# Patient Record
Sex: FEMALE | Race: WHITE | NOT HISPANIC OR LATINO | Employment: UNEMPLOYED | URBAN - METROPOLITAN AREA
[De-identification: names, ages, dates, MRNs, and addresses within clinical notes are randomized per-mention and may not be internally consistent; named-entity substitution may affect disease eponyms.]

---

## 2017-06-29 ENCOUNTER — LAB VISIT (OUTPATIENT)
Dept: LAB | Facility: HOSPITAL | Age: 27
End: 2017-06-29
Payer: COMMERCIAL

## 2017-06-29 ENCOUNTER — OFFICE VISIT (OUTPATIENT)
Dept: FAMILY MEDICINE | Facility: CLINIC | Age: 27
End: 2017-06-29
Payer: COMMERCIAL

## 2017-06-29 VITALS
TEMPERATURE: 98 F | BODY MASS INDEX: 27.39 KG/M2 | WEIGHT: 145.06 LBS | SYSTOLIC BLOOD PRESSURE: 110 MMHG | HEART RATE: 100 BPM | DIASTOLIC BLOOD PRESSURE: 60 MMHG | OXYGEN SATURATION: 98 % | RESPIRATION RATE: 18 BRPM | HEIGHT: 61 IN

## 2017-06-29 DIAGNOSIS — R29.898 WEAKNESS OF WRIST: ICD-10-CM

## 2017-06-29 DIAGNOSIS — M54.50 CHRONIC LOW BACK PAIN WITHOUT SCIATICA, UNSPECIFIED BACK PAIN LATERALITY: ICD-10-CM

## 2017-06-29 DIAGNOSIS — Z86.39 HISTORY OF IRON DEFICIENCY: ICD-10-CM

## 2017-06-29 DIAGNOSIS — G89.29 CHRONIC LOW BACK PAIN WITHOUT SCIATICA, UNSPECIFIED BACK PAIN LATERALITY: ICD-10-CM

## 2017-06-29 DIAGNOSIS — Z86.32 HISTORY OF GESTATIONAL DIABETES: ICD-10-CM

## 2017-06-29 DIAGNOSIS — M79.602 LEFT ARM PAIN: Primary | ICD-10-CM

## 2017-06-29 LAB
25(OH)D3+25(OH)D2 SERPL-MCNC: 8 NG/ML
ALBUMIN SERPL BCP-MCNC: 3.8 G/DL
ALP SERPL-CCNC: 57 U/L
ALT SERPL W/O P-5'-P-CCNC: 11 U/L
ANION GAP SERPL CALC-SCNC: 9 MMOL/L
AST SERPL-CCNC: 15 U/L
BASOPHILS # BLD AUTO: 0.02 K/UL
BASOPHILS NFR BLD: 0.2 %
BILIRUB SERPL-MCNC: 0.5 MG/DL
BUN SERPL-MCNC: 14 MG/DL
CALCIUM SERPL-MCNC: 9.1 MG/DL
CHLORIDE SERPL-SCNC: 107 MMOL/L
CHOLEST/HDLC SERPL: 4.7 {RATIO}
CO2 SERPL-SCNC: 23 MMOL/L
CREAT SERPL-MCNC: 0.7 MG/DL
DIFFERENTIAL METHOD: ABNORMAL
EOSINOPHIL # BLD AUTO: 0.1 K/UL
EOSINOPHIL NFR BLD: 0.9 %
ERYTHROCYTE [DISTWIDTH] IN BLOOD BY AUTOMATED COUNT: 13.3 %
EST. GFR  (AFRICAN AMERICAN): >60 ML/MIN/1.73 M^2
EST. GFR  (NON AFRICAN AMERICAN): >60 ML/MIN/1.73 M^2
GLUCOSE SERPL-MCNC: 92 MG/DL
HCT VFR BLD AUTO: 41.9 %
HDL/CHOLESTEROL RATIO: 21.2 %
HDLC SERPL-MCNC: 189 MG/DL
HDLC SERPL-MCNC: 40 MG/DL
HGB BLD-MCNC: 14.3 G/DL
IRON SERPL-MCNC: 95 UG/DL
LDLC SERPL CALC-MCNC: 111.8 MG/DL
LYMPHOCYTES # BLD AUTO: 2.6 K/UL
LYMPHOCYTES NFR BLD: 29.2 %
MCH RBC QN AUTO: 28.3 PG
MCHC RBC AUTO-ENTMCNC: 34.1 %
MCV RBC AUTO: 83 FL
MONOCYTES # BLD AUTO: 0.5 K/UL
MONOCYTES NFR BLD: 5.7 %
NEUTROPHILS # BLD AUTO: 5.7 K/UL
NEUTROPHILS NFR BLD: 63.6 %
NONHDLC SERPL-MCNC: 149 MG/DL
PLATELET # BLD AUTO: 117 K/UL
PMV BLD AUTO: 12.4 FL
POTASSIUM SERPL-SCNC: 4.3 MMOL/L
PROT SERPL-MCNC: 7.6 G/DL
RBC # BLD AUTO: 5.05 M/UL
SATURATED IRON: 31 %
SODIUM SERPL-SCNC: 139 MMOL/L
TOTAL IRON BINDING CAPACITY: 311 UG/DL
TRANSFERRIN SERPL-MCNC: 210 MG/DL
TRIGL SERPL-MCNC: 186 MG/DL
TSH SERPL DL<=0.005 MIU/L-ACNC: 1.52 UIU/ML
WBC # BLD AUTO: 9.01 K/UL

## 2017-06-29 PROCEDURE — 99999 PR PBB SHADOW E&M-EST. PATIENT-LVL IV: CPT | Mod: PBBFAC,,, | Performed by: FAMILY MEDICINE

## 2017-06-29 PROCEDURE — 84443 ASSAY THYROID STIM HORMONE: CPT

## 2017-06-29 PROCEDURE — 36415 COLL VENOUS BLD VENIPUNCTURE: CPT | Mod: PO

## 2017-06-29 PROCEDURE — 80061 LIPID PANEL: CPT

## 2017-06-29 PROCEDURE — 83540 ASSAY OF IRON: CPT

## 2017-06-29 PROCEDURE — 99203 OFFICE O/P NEW LOW 30 MIN: CPT | Mod: S$GLB,,, | Performed by: FAMILY MEDICINE

## 2017-06-29 PROCEDURE — 80053 COMPREHEN METABOLIC PANEL: CPT

## 2017-06-29 PROCEDURE — 85025 COMPLETE CBC W/AUTO DIFF WBC: CPT

## 2017-06-29 PROCEDURE — 83036 HEMOGLOBIN GLYCOSYLATED A1C: CPT

## 2017-06-29 PROCEDURE — 82306 VITAMIN D 25 HYDROXY: CPT

## 2017-06-29 PROCEDURE — 82607 VITAMIN B-12: CPT

## 2017-06-29 NOTE — PATIENT INSTRUCTIONS
4 Steps for Eating Healthier  Changing the way you eat can improve your health. It can lower your cholesterol and blood pressure, and help you stay at a healthy weight. Your diet doesnt have to be bland and boring to be healthy. Just watch your calories and follow these steps:    1. Eat fewer unhealthy fats  · Choose more fish and lean meats instead of fatty cuts of meat.  · Skip butter and lard, and use less margarine.  · Pass on foods that have palm, coconut, or hydrogenated oils.  · Eat fewer high-fat dairy foods like cheese, ice cream, and whole milk.  · Get a heart-healthy cookbook and try some low-fat recipes.  2. Go light on salt  · Keep the saltshaker off the table.  · Limit high-salt ingredients, such as soy sauce, bouillon, and garlic salt.  · Instead of adding salt when cooking, season your food with herbs and flavorings. Try lemon, garlic, and onion.  · Limit convenience foods, such as boxed or canned foods and restaurant food.  · Read food labels and choose lower-sodium options.  3. Limit sugar  · Pause before you add sugars to pancakes, cereal, coffee, or tea. This includes white and brown table sugar, syrup, honey, and molasses. Cut your usual amount by half.  · Use non-sugar sweeteners. Stevia, aspartame, and sucralose can satisfy a sweet tooth without adding calories.  · Swap out sugar-filled soda and other drinks. Buy sugar-free or low-calorie beverages. Remember water is always the best choice.  · Read labels and choose foods with less added sugar. Keep in mind that dairy foods and foods with fruit will have some natural sugar.  · Cut the sugar in recipes by 1/3 to 1/2. Boost the flavor with extracts like almond, vanilla, or orange. Or add spices such as cinnamon or nutmeg.  4. Eat more fiber  · Eat fresh fruits and vegetables every day.  · Boost your diet with whole grains. Go for oats, whole-grain rice, and bran.  · Add beans and lentils to your meals.  · Drink more water to match your fiber  increase. This is to help prevent constipation.  Date Last Reviewed: 5/11/2015  © 5332-9314 [employer] [address] [place] [address], [place] [address]. All rights reserved. This information is not intended as a substitute for professional medical care. Always follow your healthcare professional's instructions.

## 2017-06-30 ENCOUNTER — TELEPHONE (OUTPATIENT)
Dept: FAMILY MEDICINE | Facility: CLINIC | Age: 27
End: 2017-06-30

## 2017-06-30 DIAGNOSIS — R79.89 LOW VITAMIN B12 LEVEL: ICD-10-CM

## 2017-06-30 DIAGNOSIS — R79.89 LOW VITAMIN D LEVEL: ICD-10-CM

## 2017-06-30 DIAGNOSIS — D69.6 THROMBOCYTOPENIA: ICD-10-CM

## 2017-06-30 LAB
ESTIMATED AVG GLUCOSE: 103 MG/DL
HBA1C MFR BLD HPLC: 5.2 %
VIT B12 SERPL-MCNC: 255 PG/ML

## 2017-06-30 RX ORDER — ERGOCALCIFEROL 1.25 MG/1
50000 CAPSULE ORAL
Qty: 16 CAPSULE | Refills: 0 | Status: SHIPPED | OUTPATIENT
Start: 2017-06-30 | End: 2018-04-10

## 2017-06-30 NOTE — TELEPHONE ENCOUNTER
Call  with results;  (patient has limited English) B12 and vitamin d low - start vitamin b 12 1000 mcg daily which is over the counter.  Start prescription vitamin d once a week for 16 weeks then return to clinic in 4 months to have labs rechecked.

## 2017-06-30 NOTE — TELEPHONE ENCOUNTER
----- Message from Jennifer Heaton sent at 6/30/2017 10:50 AM CDT -----  Contact:   He was returning a call regarding her lab results.   Call him at [phone].                                       tobin

## 2017-07-03 ENCOUNTER — PATIENT MESSAGE (OUTPATIENT)
Dept: FAMILY MEDICINE | Facility: CLINIC | Age: 27
End: 2017-07-03

## 2017-08-08 RX ORDER — ERGOCALCIFEROL 1.25 MG/1
50000 CAPSULE ORAL
Qty: 16 CAPSULE | Refills: 0 | OUTPATIENT
Start: 2017-08-08

## 2017-08-29 ENCOUNTER — OFFICE VISIT (OUTPATIENT)
Dept: PAIN MEDICINE | Facility: CLINIC | Age: 27
End: 2017-08-29
Payer: COMMERCIAL

## 2017-08-29 ENCOUNTER — HOSPITAL ENCOUNTER (OUTPATIENT)
Dept: RADIOLOGY | Facility: HOSPITAL | Age: 27
Discharge: HOME | End: 2017-08-29
Attending: ANESTHESIOLOGY
Payer: COMMERCIAL

## 2017-08-29 VITALS
RESPIRATION RATE: 18 BRPM | WEIGHT: 145 LBS | HEIGHT: 61 IN | SYSTOLIC BLOOD PRESSURE: 116 MMHG | BODY MASS INDEX: 27.38 KG/M2 | DIASTOLIC BLOOD PRESSURE: 52 MMHG | HEART RATE: 92 BPM

## 2017-08-29 DIAGNOSIS — G89.29 CHRONIC LOW BACK PAIN WITHOUT SCIATICA, UNSPECIFIED BACK PAIN LATERALITY: ICD-10-CM

## 2017-08-29 DIAGNOSIS — G89.29 CHRONIC THORACIC BACK PAIN, UNSPECIFIED BACK PAIN LATERALITY: ICD-10-CM

## 2017-08-29 DIAGNOSIS — M54.6 CHRONIC THORACIC BACK PAIN, UNSPECIFIED BACK PAIN LATERALITY: ICD-10-CM

## 2017-08-29 DIAGNOSIS — M54.12 RADICULOPATHY OF CERVICAL SPINE: Primary | ICD-10-CM

## 2017-08-29 DIAGNOSIS — M79.18 MYOFASCIAL PAIN: ICD-10-CM

## 2017-08-29 DIAGNOSIS — M54.50 CHRONIC LOW BACK PAIN WITHOUT SCIATICA, UNSPECIFIED BACK PAIN LATERALITY: ICD-10-CM

## 2017-08-29 PROCEDURE — 72070 X-RAY EXAM THORAC SPINE 2VWS: CPT | Mod: 26,,, | Performed by: RADIOLOGY

## 2017-08-29 PROCEDURE — 72114 X-RAY EXAM L-S SPINE BENDING: CPT | Mod: TC

## 2017-08-29 PROCEDURE — 99204 OFFICE O/P NEW MOD 45 MIN: CPT | Mod: S$GLB,,, | Performed by: ANESTHESIOLOGY

## 2017-08-29 PROCEDURE — 72070 X-RAY EXAM THORAC SPINE 2VWS: CPT | Mod: TC

## 2017-08-29 PROCEDURE — 99999 PR PBB SHADOW E&M-EST. PATIENT-LVL III: CPT | Mod: PBBFAC,,, | Performed by: ANESTHESIOLOGY

## 2017-08-29 PROCEDURE — 3008F BODY MASS INDEX DOCD: CPT | Mod: S$GLB,,, | Performed by: ANESTHESIOLOGY

## 2017-08-29 PROCEDURE — 72114 X-RAY EXAM L-S SPINE BENDING: CPT | Mod: 26,,, | Performed by: RADIOLOGY

## 2017-08-29 NOTE — LETTER
August 29, 2017      Althea Fu MD  [address]  [facility] [address]           [facility]  [id number] [place] [address]  [place] [address]  Phone: [phone]  Fax: [phone]          Patient: Karlee Guevara   MR Number: [id number]   YOB: 1990   Date of Visit: 8/29/2017       Dear Dr. Althea Fu:    Thank you for referring Karlee Guevara to me for evaluation. Attached you will find relevant portions of my assessment and plan of care.    If you have questions, please do not hesitate to call me. I look forward to following Karlee Guevara along with you.    Sincerely,    Maximino Bustamante MD    Enclosure  CC:  No Recipients    If you would like to receive this communication electronically, please contact [email or web address] or [phone] to request more information on [employer] Link access.    For providers and/or their staff who would like to refer a patient to [facility], please contact us through our one-stop-shop provider referral line, [facility] [place], at [phone].    If you feel you have received this communication in error or would no longer like to receive these types of communications, please e-mail [email or web address]

## 2017-08-29 NOTE — PROGRESS NOTES
"Chief Pain Complaint:  Neck pain, right arm pain, mid and low back pain    History of Present Illness:   This patient is a 27 y.o. female who presents today complaining of the above noted pain/s. The patient describes the pain as follows.    - duration of pain: 8 years   - timing: intermittent   - character: aching, sharp  - radiating, dermatomal: extends into right arm, not strictly dermatomal, thoracic and lumbar pain is nonradiating  - antecedent trauma, prior spinal surgery: no prior trauma, no prior spinal surgery   - pertinent negatives: No fever, No chills, No weight loss, No bladder dysfunction, No bowel dysfunction, No saddle anesthesia  - pertinent positives: right arm weakness    - medications, other therapies tried (physical therapy, injections):     >> Tylenol    >> Has previously undergone Physical Therapy, only underwent a couple of sessions of PT, also tried chiropractic care    >> Has NOT previously undergone spinal injection/s      Imaging / Labs / Studies (reviewed on 8/29/2017): None for review      Review of Systems:  CONSTITUTIONAL: patient denies any fever, chills, or weight loss  SKIN: patient denies any rash or itching  RESPIRATORY: patient denies having any shortness of breath  GASTROINTESTINAL: patient denies having any diarrhea, constipation, or bowel incontinence  GENITOURINARY: patient denies having any abnormal bladder function    MUSCULOSKELETAL:  - patient complains of the above noted pain/s (see chief pain complaint)    NEUROLOGICAL:   - pain as above  - strength in Upper extremities is decreased, on the RIGHT  - sensation in Upper extremities is abnormal, on the RIGHT  - patient denies any loss of bowel or bladder control      PSYCHIATRIC: patient denies any change in mood    Other:  All other systems reviewed and are negative      Physical Exam:  BP (!) 116/52 (BP Location: Right arm, Patient Position: Sitting, BP Method: Medium (Manual))   Pulse 92   Resp 18   Ht 5' 1" (1.549 " m)   Wt 65.8 kg (145 lb)   BMI 27.40 kg/m²  (reviewed on 8/29/2017)  General: alert and oriented, in no apparent distress  Gait: normal gait  Skin: No rashes, No discoloration, No obvious lesions  HEENT: EOMI  Cardiovascular: no significant peripheral edema present  Respiratory: respirations nonlabored    Musculoskeletal:  - Any pain on flexion, extension, rotation:    >> No pain on extension and rotation of the lumbar spine, mild right side pain on extension and rotation of the C-spine    - Spurling's Test: causes No radiating pain  - Any tenderness to palpation across paraspinal muscles, joints, bursae:     >> No pain across thoracic or lumbar paraspinals, mild tenderness along right side cervical paraspinals into right upper trapezius    Neuro:  - Upper Extremity Strength:     >> LEFT :: 5/5    >> RIGHT :: 5/5  - Upper Extremity Reflexes:    >> LEFT  :: 2+    >> RIGHT :: 2+    - Harrington's sign:     >> LEFT :: negative    >> RIGHT :: negative      Psych:  Mood and affect is appropriate      Assessment:  Myofascial pain  Cervical radiculopathy  Thoracic pain  Lumbago      Plan:  Patient presents today complaining of intermittent right side neck pain with extension into the right arm, along with nonradiating thoracic and lumbar pain.  She has had this pain for years.  Again pain comes and goes, she has the pain primarily with increased activity.  Patient's pain appears more myofascial to me.  They expressed interest in undergoing further workup.  I will send patient for a cervical MRI along with a thoracic and lumbar x-ray.  I will have patient back in 2-3 weeks for imaging review.  Imaging / studies reviewed, detailed above.  I discussed in detail the risks, benefits, and alternatives to any and all potential treatment options.  All questions and concerns were fully addressed today in clinic.      Disclaimer:  This note may have been prepared using voice recognition software, it may have not been extensively  proofed, as such there could be errors within the text such as sound alike errors.

## 2017-09-05 ENCOUNTER — HOSPITAL ENCOUNTER (OUTPATIENT)
Dept: RADIOLOGY | Facility: HOSPITAL | Age: 27
Discharge: HOME | End: 2017-09-05
Attending: ANESTHESIOLOGY
Payer: COMMERCIAL

## 2017-09-05 DIAGNOSIS — M54.12 RADICULOPATHY OF CERVICAL SPINE: ICD-10-CM

## 2017-09-05 PROCEDURE — 72141 MRI NECK SPINE W/O DYE: CPT | Mod: TC

## 2017-09-23 ENCOUNTER — PATIENT MESSAGE (OUTPATIENT)
Dept: PAIN MEDICINE | Facility: CLINIC | Age: 27
End: 2017-09-23

## 2017-09-26 ENCOUNTER — PATIENT MESSAGE (OUTPATIENT)
Dept: PAIN MEDICINE | Facility: CLINIC | Age: 27
End: 2017-09-26

## 2017-10-25 ENCOUNTER — OFFICE VISIT (OUTPATIENT)
Dept: INTERNAL MEDICINE | Facility: CLINIC | Age: 27
End: 2017-10-25
Payer: COMMERCIAL

## 2017-10-25 VITALS
WEIGHT: 147.69 LBS | HEIGHT: 61 IN | BODY MASS INDEX: 27.88 KG/M2 | TEMPERATURE: 97 F | HEART RATE: 80 BPM | DIASTOLIC BLOOD PRESSURE: 60 MMHG | SYSTOLIC BLOOD PRESSURE: 102 MMHG

## 2017-10-25 DIAGNOSIS — Z23 INFLUENZA VACCINATION ADMINISTERED AT CURRENT VISIT: Primary | ICD-10-CM

## 2017-10-25 DIAGNOSIS — Z23 NEED FOR VARICELLA VACCINE: ICD-10-CM

## 2017-10-25 DIAGNOSIS — Z23 NEED FOR MMR VACCINE: ICD-10-CM

## 2017-10-25 DIAGNOSIS — Z23 NEED FOR TDAP VACCINATION: ICD-10-CM

## 2017-10-25 PROCEDURE — 90715 TDAP VACCINE 7 YRS/> IM: CPT | Mod: S$GLB,,, | Performed by: FAMILY MEDICINE

## 2017-10-25 PROCEDURE — 90707 MMR VACCINE SC: CPT | Mod: S$GLB,,, | Performed by: FAMILY MEDICINE

## 2017-10-25 PROCEDURE — 99214 OFFICE O/P EST MOD 30 MIN: CPT | Mod: 25,S$GLB,, | Performed by: NURSE PRACTITIONER

## 2017-10-25 PROCEDURE — 90716 VAR VACCINE LIVE SUBQ: CPT | Mod: S$GLB,,, | Performed by: FAMILY MEDICINE

## 2017-10-25 PROCEDURE — 90686 IIV4 VACC NO PRSV 0.5 ML IM: CPT | Mod: S$GLB,,, | Performed by: FAMILY MEDICINE

## 2017-10-25 PROCEDURE — 90471 IMMUNIZATION ADMIN: CPT | Mod: S$GLB,,, | Performed by: FAMILY MEDICINE

## 2017-10-25 PROCEDURE — 99999 PR PBB SHADOW E&M-EST. PATIENT-LVL IV: CPT | Mod: PBBFAC,,, | Performed by: NURSE PRACTITIONER

## 2017-10-25 PROCEDURE — 90472 IMMUNIZATION ADMIN EACH ADD: CPT | Mod: S$GLB,,, | Performed by: FAMILY MEDICINE

## 2017-10-25 NOTE — PATIENT INSTRUCTIONS
Measles/Mumps/Rubella Vaccines, MMR injection  What is this medicine?  MEASLES VIRUS; MUMPS VIRUS; RUBELLA VIRUS VACCINE LIVE (LANDON zuhlz [facility]; muhmps [facility]; layla yolanda [facility] vak SEEN lahyv ) is used to prevent an infection with measles (rubeola), mumps, and rubella ([place] measles) viruses. It is used to prevent infection in children over 12 months old, adults that have not been vaccinated and are not pregnant, and anyone traveling to countries where there are high rates of measles, mumps, or rubella.  How should I use this medicine?  This vaccine is for injection under the skin. It is given by a health care professional.  A copy of Vaccine Information Statements will be given before each vaccination. Read this sheet carefully each time. The sheet may change frequently.  Talk to your pediatrician regarding the use of this medicine in children. While this drug may be prescribed for children as young as 12 months of age for selected conditions, precautions do apply.  What side effects may I notice from receiving this medicine?  Side effects that you should report to your doctor or health care professional as soon as possible:  · allergic reactions like skin rash, itching or hives, swelling of the face, lips, or tongue  · breathing problems  · changes in hearing  · changes in vision  · difficulty walking  · extreme changes in behavior  · fast, irregular heartbeat  · fever over 100 degrees F  · pain, tingling, numbness in the hands or feet  · seizures  · unusual bleeding or bruising  · unusually weak or tired  Side effects that usually do not require medical attention (report to your doctor or health care professional if they continue or are bothersome):  · aches or pains  · bruising, pain, swelling at site where injected  · diarrhea  · headache  · low-grade fever of 100 degrees F or less  · nausea, vomiting  · runny nose, cough  · sleepy  · swollen glands  What may interact with this medicine?  Do not  take this medicine with any of the following medications:  · adalimumab  · anakinra  · etanercept  · infliximab  · medicines that suppress your immune system  · medicines to treat cancer  This medicine may also interact with the following medications:  · immune globulins  · live virus vaccines  What if I miss a dose?  Keep appointments for follow-up (booster) doses as directed. It is important not to miss your dose. Call your doctor or health care professional if you are unable to keep an appointment.  Where should I keep my medicine?  This drug is given in a hospital or clinic and will not be stored at home.  What should I tell my health care provider before I take this medicine?  They need to know if you have any of these conditions:  · bleeding disorder  · cancer including leukemia or lymphoma  · immune system problems  · infection with fever  · low levels of platelets in the blood  · recent blood transfusion or immune globulin infusion  · seizure disorder  · taking medicines for immunosuppression  · an unusual or allergic reaction to vaccines, eggs, neomycin, gelatin, other medicines, foods, dyes, or preservatives  · pregnant or trying to get pregnant  · breast-feeding  What should I watch for while using this medicine?  Visit your doctor for check-ups as directed.  Do not become pregnant for 3 months after receiving this vaccine. Women should inform their doctor if they wish to become pregnant or think they might be pregnant. There is a potential for serious side effects to an unborn child. Talk to your health care professional or pharmacist for more information.  NOTE:This sheet is a summary. It may not cover all possible information. If you have questions about this medicine, talk to your doctor, pharmacist, or health care provider. Copyright© 2017 [employer]        Measles Virus; Mumps Virus; Rubella Virus; Varicella Virus Vaccine, Live  What is this medicine?  MEASLES VIRUS; MUMPS VIRUS; RUBELLA VIRUS;  VARICELLA VIRUS VACCINE LIVE (LANDON zuhlz ; muhmps ; layla BEL ; and claudia  GUILLE [facility]) is a live vaccine to protect from an infection with measles (rubeola), mumps, rubella ([place] measles), and varicella (chickenpox) viruses. It is approved for use in children 1 to 12 years of age.  How should I use this medicine?  This vaccine is for injection under the skin. It is given by a health care professional.  A copy of Vaccine Information Statements will be given before each vaccination. Read this sheet carefully each time. The sheet may change frequently.  Talk to your pediatrician regarding the use of this medicine in children. While this drug may be prescribed for children as young as 1 year of age for selected conditions, precautions do apply.  What side effects may I notice from receiving this medicine?  Side effects that you should report to your doctor or health care professional as soon as possible:  · allergic reactions like skin rash, itching or hives, swelling of the face, lips, or tongue  · breathing problems  · ear pain  · extreme irritability  · fever over 102 degrees F  · seizures  · unusual bruising or bleeding  · unusual drooping or paralysis of face  · muscle weakness  Side effects that usually do not require medical attention (report to your doctor or health care professional if they continue or are bothersome):  · cough  · diarrhea  · headache  · muscle aches and pains  · pain at site where injected  · runny or stuffy nose  · tired  · trouble sleeping  What may interact with this medicine?  Do not take this medicine with any of the following medications:  · adalimumab  · anakinra  · etanercept  · infliximab  · medicines for organ transplant  · some medicines for arthritis  · steroid medicines like prednisone or cortisone  This medicine may also interact with the following medications:  · aspirin and aspirin-like medicines  · immunoglobulins  · medicines to treat cancer  · other  vaccines  What if I miss a dose?  This does not apply.  Where should I keep my medicine?  This drug is given in a hospital or clinic and will not be stored at home.  What should I tell my health care provider before I take this medicine?  They need to know if you have any of these conditions:  · blood system disease or problem  · cancer  · fever or infection  · history of organ transplant  · immune system problems  · other chronic health problems  · seizures  · taking steroids or other medicines to suppress the immune system  · tuberculosis  · an unusual or allergic reaction to measles, mumps, rubella, or varicella virus vaccine, neomycin, gelatin, eggs, albumin, other medicines, foods, dyes, or preservatives  · pregnant or trying to get pregnant  · breast-feeding  What should I watch for while using this medicine?  This vaccine may not protect from all measles, mumps, rubella, and varicella infections.  After you receive this vaccine, stay away from people who are at a high risk for varicella infection. You could give the varicella infection to another person for up to 6 weeks after getting this vaccine. This includes people with HIV or AIDS, people with cancer, some pregnant women, and some babies. Ask your health care professional if you have any questions.  Do not take any aspirin products for 6 weeks after receiving this vaccine.  Women should inform their doctor if they wish to become pregnant or think they might be pregnant. There is a potential for serious side effects to an unborn child. Use effective birth control for at least 3 months after receiving this vaccine. Talk to your health care professional or pharmacist for more information.  NOTE:This sheet is a summary. It may not cover all possible information. If you have questions about this medicine, talk to your doctor, pharmacist, or health care provider. Copyright© 2017 [employer]

## 2017-10-25 NOTE — PROGRESS NOTES
Subjective:       Patient ID: Karlee Guevara is a 27 y.o. female.    Chief Complaint: Immunizations (flu, varicella, mmr)    Her  is here to translate for her as she speaks minimal English. 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 She has been in this country [place] 2 years. She was pregnant and therefore did not receive the MMR and varicella vaccines a year ago. She denies fever, sweats, chills, sob, headache, abd pain, n/v/d, cancer dx, immunosuppressing condition or medication, pregnancy, night sweats, unintentional weight loss or any other acute issues.       Review of Systems   Constitutional: Negative for activity change, appetite change, chills, diaphoresis, fatigue, fever and unexpected weight change.   HENT: Negative for congestion, ear pain, postnasal drip, rhinorrhea, sinus pressure, sneezing and sore throat.    Eyes: Negative for photophobia, pain and visual disturbance.   Respiratory: Negative for cough, chest tightness and shortness of breath.    Cardiovascular: Negative for chest pain, palpitations and leg swelling.   Gastrointestinal: Negative for abdominal pain, constipation, diarrhea, nausea and vomiting.   Genitourinary: Negative for dysuria and frequency.   Musculoskeletal: Negative for arthralgias.   Neurological: Negative for dizziness, light-headedness and headaches.   Psychiatric/Behavioral: Negative for sleep disturbance.       Objective:      Physical Exam   Constitutional: She is oriented to person, place, and time. She appears well-developed and well-nourished.   HENT:   Head: Normocephalic and atraumatic.   Right Ear: Tympanic membrane normal.   Left Ear: Tympanic membrane normal.   Eyes: Conjunctivae and EOM are normal. Pupils are equal, round, and reactive to light.   Neck: Normal range of motion. Neck supple.   Cardiovascular: Normal rate, regular rhythm, normal heart sounds and intact distal pulses.    Pulmonary/Chest: Effort normal and  breath sounds normal.   Abdominal: Soft. Bowel sounds are normal.   Musculoskeletal: Normal range of motion.   Neurological: She is alert and oriented to person, place, and time.   Skin: Skin is warm and dry.   Psychiatric: She has a normal mood and affect.       Assessment:       1. Influenza vaccination administered at current visit    2. Need for MMR vaccine    3. Need for varicella vaccine    4. Need for Tdap vaccination        Plan:   1. MMR, Varicella, Flu, and Tdap immunizations now- possible side effects discussed in detail  2. Print outs for gonorrhea/chlymydia and TB gold results given  3. Immunization records given

## 2017-10-30 ENCOUNTER — OFFICE VISIT (OUTPATIENT)
Dept: FAMILY MEDICINE | Facility: CLINIC | Age: 27
End: 2017-10-30
Payer: COMMERCIAL

## 2017-10-30 ENCOUNTER — LAB VISIT (OUTPATIENT)
Dept: LAB | Facility: HOSPITAL | Age: 27
End: 2017-10-30
Attending: REGISTERED NURSE
Payer: COMMERCIAL

## 2017-10-30 VITALS
TEMPERATURE: 98 F | DIASTOLIC BLOOD PRESSURE: 66 MMHG | SYSTOLIC BLOOD PRESSURE: 122 MMHG | WEIGHT: 148.13 LBS | RESPIRATION RATE: 18 BRPM | HEIGHT: 61 IN | HEART RATE: 98 BPM | OXYGEN SATURATION: 99 % | BODY MASS INDEX: 27.97 KG/M2

## 2017-10-30 DIAGNOSIS — R79.89 LOW VITAMIN B12 LEVEL: ICD-10-CM

## 2017-10-30 DIAGNOSIS — H57.11 EYE PAIN, RIGHT: Primary | ICD-10-CM

## 2017-10-30 DIAGNOSIS — D69.6 THROMBOCYTOPENIA: ICD-10-CM

## 2017-10-30 DIAGNOSIS — R79.89 LOW VITAMIN D LEVEL: ICD-10-CM

## 2017-10-30 PROCEDURE — 82607 VITAMIN B-12: CPT

## 2017-10-30 PROCEDURE — 99999 PR PBB SHADOW E&M-EST. PATIENT-LVL IV: CPT | Mod: PBBFAC,,, | Performed by: REGISTERED NURSE

## 2017-10-30 PROCEDURE — 36415 COLL VENOUS BLD VENIPUNCTURE: CPT | Mod: PO

## 2017-10-30 PROCEDURE — 85025 COMPLETE CBC W/AUTO DIFF WBC: CPT

## 2017-10-30 PROCEDURE — 82306 VITAMIN D 25 HYDROXY: CPT

## 2017-10-30 PROCEDURE — 99213 OFFICE O/P EST LOW 20 MIN: CPT | Mod: S$GLB,,, | Performed by: REGISTERED NURSE

## 2017-10-31 PROBLEM — D69.6 THROMBOCYTOPENIA: Status: RESOLVED | Noted: 2017-06-30 | Resolved: 2017-10-31

## 2017-10-31 LAB
25(OH)D3+25(OH)D2 SERPL-MCNC: 16 NG/ML
BASOPHILS # BLD AUTO: 0.05 K/UL
BASOPHILS NFR BLD: 0.5 %
DIFFERENTIAL METHOD: NORMAL
EOSINOPHIL # BLD AUTO: 0.1 K/UL
EOSINOPHIL NFR BLD: 1.3 %
ERYTHROCYTE [DISTWIDTH] IN BLOOD BY AUTOMATED COUNT: 12.3 %
HCT VFR BLD AUTO: 39.8 %
HGB BLD-MCNC: 13.4 G/DL
IMM GRANULOCYTES # BLD AUTO: 0.01 K/UL
IMM GRANULOCYTES NFR BLD AUTO: 0.1 %
LYMPHOCYTES # BLD AUTO: 3.1 K/UL
LYMPHOCYTES NFR BLD: 30.1 %
MCH RBC QN AUTO: 27.8 PG
MCHC RBC AUTO-ENTMCNC: 33.7 G/DL
MCV RBC AUTO: 83 FL
MONOCYTES # BLD AUTO: 0.6 K/UL
MONOCYTES NFR BLD: 5.4 %
NEUTROPHILS # BLD AUTO: 6.5 K/UL
NEUTROPHILS NFR BLD: 62.6 %
NRBC BLD-RTO: 0 /100 WBC
PLATELET # BLD AUTO: 223 K/UL
PMV BLD AUTO: 11.8 FL
RBC # BLD AUTO: 4.82 M/UL
VIT B12 SERPL-MCNC: 519 PG/ML
WBC # BLD AUTO: 10.43 K/UL

## 2017-10-31 NOTE — PROGRESS NOTES
"Subjective:       Patient ID: Karlee Guevara is a 27 y.o. female.    Chief Complaint: Eye Pain      HPI    Karlee is here today with her , with c/o RT eye pain.  Reports her son poked something into the eye yesterday, she thinks might have been a piece of chocolate.  Since that time, she has had pain in the RT eye with a "black spot".  Eye has been draining some.  Denies vision problems.    Also here today to f/u on labs ordered per Dr. Fu.  Vitamin-D and B12 have been low, she has been taking her supplements as ordered.  Does c/o hair thinning and falling out, muscle aches and leg pain at times.  Reports intermittent hand numbness.      Component      Latest Ref Rng & Units 6/29/2017   Vitamin B-12      210 - 950 pg/mL 255       Component      Latest Ref Rng & Units 6/29/2017   Vit D, 25-Hydroxy      30 - 96 ng/mL 8 (L)       Review of Systems   Constitutional: Negative.    Eyes: Positive for pain and discharge. Negative for photophobia, redness, itching and visual disturbance.   Respiratory: Negative.    Cardiovascular: Negative.    Musculoskeletal: Positive for myalgias.   Skin: Positive for rash (facial acne).   Neurological: Negative.    Psychiatric/Behavioral: Negative.          Patient Active Problem List   Diagnosis    Low vitamin B12 level    Low vitamin D level    Insulin resistance    Iron deficiency anemia    Overweight (BMI 25.0-29.9)         Objective:     Vitals:    10/30/17 1624   BP: 122/66   BP Location: Left arm   Patient Position: Sitting   BP Method: Medium (Manual)   Pulse: 98   Resp: 18   Temp: 98.1 °F (36.7 °C)   TempSrc: Tympanic   SpO2: 99%   Weight: 67.2 kg (148 lb 2.4 oz)   Height: 5' 1" (1.549 m)       Physical Exam   Constitutional: She is oriented to person, place, and time. She appears well-developed.   Eyes: Conjunctivae, EOM and lids are normal. Pupils are equal, round, and reactive to light. Right eye exhibits no discharge, no exudate and no hordeolum. No " foreign body present in the right eye. Right conjunctiva is not injected. Right conjunctiva has no hemorrhage. No scleral icterus.   Neurological: She is alert and oriented to person, place, and time.         Medication List with Changes/Refills   Current Medications    ERGOCALCIFEROL (ERGOCALCIFEROL) 50,000 UNIT CAP    Take 1 capsule (50,000 Units total) by mouth every 7 days.    OMEPRAZOLE (PRILOSEC) 10 MG CAPSULE    Take 10 mg by mouth once daily.           Diagnosis       1. Eye pain, right    2. Low vitamin D level    3. Low vitamin B12 level          Assessment/ Plan     Eye pain, right  -     Ambulatory Referral to Ophthalmology    Low vitamin D level    Low vitamin B12 level        Lab pending --- ordered and to be reviewed by Dr. Fu.  To Eye MD for evaluation.  Follow-up in clinic as needed.        BARBARA Martin  [facility]

## 2017-11-01 ENCOUNTER — PATIENT MESSAGE (OUTPATIENT)
Dept: FAMILY MEDICINE | Facility: CLINIC | Age: 27
End: 2017-11-01

## 2017-11-21 ENCOUNTER — OFFICE VISIT (OUTPATIENT)
Dept: OPHTHALMOLOGY | Facility: CLINIC | Age: 27
End: 2017-11-21
Payer: COMMERCIAL

## 2017-11-21 DIAGNOSIS — H57.11 PAIN OF RIGHT EYE: Primary | ICD-10-CM

## 2017-11-21 PROCEDURE — 92002 INTRM OPH EXAM NEW PATIENT: CPT | Mod: S$GLB,,, | Performed by: OPTOMETRIST

## 2017-11-21 PROCEDURE — 99999 PR PBB SHADOW E&M-EST. PATIENT-LVL II: CPT | Mod: PBBFAC,,, | Performed by: OPTOMETRIST

## 2018-04-05 NOTE — PROGRESS NOTES
Subjective:       Patient ID: Karlee Gueavra is a 28 y.o. female.    Chief Complaint: Headache and Dizziness      HPI    Karlee is here today with her , who interprets for her today due to language barrier.  Does have h/o Vitamin-D deficiency, low b12 and anemia but has not been taking her supplements.  She has noticed over the past few weeks, increased fatigue, some hair loss, muscle aches and a feeling of overall malaise.  Does also c/o posterior headache pain radiating down RT arm at times.  Describes her HA pain as more of a pressure with occ episodes of dizziness and numbness to fingertips.  She has seen Dr. Bustamante for neck and back issues previously.  Radiology reports reviewed.  She has been taking OTC pain medication with ice and heat, works okay per her report.    Also with reports of being excessively unhappy and has an excessive need for attention.  Has frequent crying spells.  Reports good appetite, sleeping well.  Denies any suicidal thoughts or plans to harm self,  or small child.  Denies post-partum depression or anxiety.  They request for her to see someone to speak with regarding these issues.          Procedure:  Exam Date: Reason for Exam:   MRI Cervical Spine Without Contrast 09/05/2017 neck pain, right arm pain      RESULTS:  Exam: MRI of the cervical spine without contrast.     History:  Neck pain, right arm pain. Radiculopathy, cervical region.     Findings:     Alignment is normal. The cervical cord demonstrates normal morphology and signal. The craniocervical junction is unremarkable.     Small central disc protrusion at C5-6, without significant mass effect on the thecal sac. Remainder of the intervertebral discs demonstrate normal morphology and signal. Central canal and neural foramina are widely patent.    IMPRESSION:   Small central disc protrusion at C5-6 without significant mass effect.        Procedure:  Exam Date: Reason for Exam:   X-Ray Lumbar Complete  "With Flex And Ext 08/29/2017 low back pain      RESULTS:  Seven views of the lumbar spine including flexion and extension views.     Findings: The vertebral bodies demonstrate normal height.  The alignment is within normal limits. The disk space heights are maintained. Mild facet arthropathy suspected at L5-S1 level bilaterally. No pars defects.No listhesis noted on the flexion or extension views.           Review of Systems   Constitutional: Positive for fatigue. Negative for activity change, appetite change, chills, diaphoresis, fever and unexpected weight change.   HENT: Negative.    Eyes: Negative.    Respiratory: Negative.    Cardiovascular: Negative.    Gastrointestinal: Negative.    Genitourinary: Negative.    Musculoskeletal: Positive for myalgias and neck pain. Negative for back pain, gait problem, joint swelling and neck stiffness.   Skin: Negative.    Neurological: Positive for dizziness, light-headedness, numbness and headaches. Negative for tremors, seizures, syncope, facial asymmetry, speech difficulty and weakness.   Hematological: Negative for adenopathy. Does not bruise/bleed easily.   Psychiatric/Behavioral: Positive for confusion and dysphoric mood. Negative for agitation, decreased concentration, hallucinations, self-injury, sleep disturbance and suicidal ideas. The patient is not nervous/anxious and is not hyperactive.          Patient Active Problem List   Diagnosis    Low vitamin B12 level    Low vitamin D level    Insulin resistance    Iron deficiency anemia    Overweight (BMI 25.0-29.9)       Past medical, social and family histories have been reviewed today.      Objective:     Vitals:    04/06/18 1029   BP: 110/73   BP Location: Right arm   Patient Position: Sitting   BP Method: Large (Automatic)   Pulse: 86   Resp: 17   Temp: 98 °F (36.7 °C)   TempSrc: Tympanic   SpO2: 99%   Weight: 67.5 kg (148 lb 13 oz)   Height: 5' 1" (1.549 m)       Physical Exam   Constitutional: She is oriented " to person, place, and time. She appears well-developed and well-nourished.   HENT:   Head: Normocephalic and atraumatic.   Eyes: Conjunctivae and EOM are normal. Pupils are equal, round, and reactive to light.   Neck: Normal range of motion and full passive range of motion without pain. Muscular tenderness present. No spinous process tenderness present. Normal range of motion present.       Cardiovascular: Normal rate, regular rhythm and normal heart sounds.  Exam reveals no gallop and no friction rub.    No murmur heard.  Pulmonary/Chest: Effort normal and breath sounds normal.   Musculoskeletal: Normal range of motion.        Lumbar back: She exhibits tenderness. She exhibits no swelling, no edema, no deformity and normal pulse.        Back:    Neurological: She is alert and oriented to person, place, and time. No sensory deficit. She exhibits normal muscle tone. Coordination normal.   Skin: Skin is warm and dry. Capillary refill takes less than 2 seconds.   Psychiatric: She has a normal mood and affect. Her behavior is normal. Judgment and thought content normal. She is not slowed and not withdrawn. Thought content is not paranoid and not delusional. She expresses no suicidal ideation. She is attentive.   Vitals reviewed.        Medication List with Changes/Refills   Current Medications    ERGOCALCIFEROL (ERGOCALCIFEROL) 50,000 UNIT CAP    Take 1 capsule (50,000 Units total) by mouth every 7 days.    OMEPRAZOLE (PRILOSEC) 10 MG CAPSULE    Take 10 mg by mouth once daily.           Diagnosis       1. Fatigue, unspecified type    2. Vitamin D deficiency disease    3. Iron deficiency anemia, unspecified iron deficiency anemia type    4. Low vitamin B12 level    5. Radiculopathy of cervical spine    6. DDD (degenerative disc disease), lumbosacral    7. Depression, unspecified depression type          Assessment/ Plan     Fatigue, unspecified type  -     CBC Without Differential; Future; Expected date: 04/06/2018  -      Basic metabolic panel; Future; Expected date: 04/06/2018  -     TSH; Future; Expected date: 04/06/2018  -     Vitamin D; Future; Expected date: 04/06/2018  -     Ferritin; Future; Expected date: 04/06/2018  -     Iron and TIBC; Future; Expected date: 04/06/2018  -     Vitamin B12; Future; Expected date: 04/06/2018    Vitamin D deficiency disease  -     Vitamin D; Future; Expected date: 04/06/2018    Iron deficiency anemia, unspecified iron deficiency anemia type  -     CBC Without Differential; Future; Expected date: 04/06/2018  -     Basic metabolic panel; Future; Expected date: 04/06/2018  -     Ferritin; Future; Expected date: 04/06/2018  -     Iron and TIBC; Future; Expected date: 04/06/2018    Low vitamin B12 level  -     Vitamin B12; Future; Expected date: 04/06/2018    DDD (degenerative disc disease), lumbosacral        -     This problem is currently not controlled.  -     Ambulatory referral to Pain Clinic    Radiculopathy of cervical spine        -     This problem is currently not controlled.  -     Ambulatory referral to Pain Clinic    Depression, unspecified depression type        -     This is a new problem that has been identified during today's visit.         -     No medication at this time, she wishes to speak with a counselor or Psychologist regarding issues.  -     Ambulatory referral to Social Work        Lab pending.  Follow-up in clinic as needed.        BARBARA Martin  [facility]

## 2018-04-06 ENCOUNTER — LAB VISIT (OUTPATIENT)
Dept: LAB | Facility: HOSPITAL | Age: 28
End: 2018-04-06
Payer: COMMERCIAL

## 2018-04-06 ENCOUNTER — OFFICE VISIT (OUTPATIENT)
Dept: FAMILY MEDICINE | Facility: CLINIC | Age: 28
End: 2018-04-06
Payer: COMMERCIAL

## 2018-04-06 VITALS
HEIGHT: 61 IN | BODY MASS INDEX: 28.1 KG/M2 | RESPIRATION RATE: 17 BRPM | SYSTOLIC BLOOD PRESSURE: 110 MMHG | DIASTOLIC BLOOD PRESSURE: 73 MMHG | WEIGHT: 148.81 LBS | HEART RATE: 86 BPM | TEMPERATURE: 98 F | OXYGEN SATURATION: 99 %

## 2018-04-06 DIAGNOSIS — D50.9 IRON DEFICIENCY ANEMIA, UNSPECIFIED IRON DEFICIENCY ANEMIA TYPE: ICD-10-CM

## 2018-04-06 DIAGNOSIS — E55.9 VITAMIN D DEFICIENCY DISEASE: ICD-10-CM

## 2018-04-06 DIAGNOSIS — M54.12 RADICULOPATHY OF CERVICAL SPINE: ICD-10-CM

## 2018-04-06 DIAGNOSIS — R79.89 LOW VITAMIN B12 LEVEL: ICD-10-CM

## 2018-04-06 DIAGNOSIS — R53.83 FATIGUE, UNSPECIFIED TYPE: ICD-10-CM

## 2018-04-06 DIAGNOSIS — F32.A DEPRESSION, UNSPECIFIED DEPRESSION TYPE: ICD-10-CM

## 2018-04-06 DIAGNOSIS — M51.37 DDD (DEGENERATIVE DISC DISEASE), LUMBOSACRAL: ICD-10-CM

## 2018-04-06 DIAGNOSIS — R53.83 FATIGUE, UNSPECIFIED TYPE: Primary | ICD-10-CM

## 2018-04-06 LAB
25(OH)D3+25(OH)D2 SERPL-MCNC: 18 NG/ML
ANION GAP SERPL CALC-SCNC: 9 MMOL/L
BUN SERPL-MCNC: 11 MG/DL
CALCIUM SERPL-MCNC: 9.4 MG/DL
CHLORIDE SERPL-SCNC: 106 MMOL/L
CO2 SERPL-SCNC: 25 MMOL/L
CREAT SERPL-MCNC: 0.7 MG/DL
ERYTHROCYTE [DISTWIDTH] IN BLOOD BY AUTOMATED COUNT: 12.4 %
EST. GFR  (AFRICAN AMERICAN): >60 ML/MIN/1.73 M^2
EST. GFR  (NON AFRICAN AMERICAN): >60 ML/MIN/1.73 M^2
FERRITIN SERPL-MCNC: 108 NG/ML
GLUCOSE SERPL-MCNC: 95 MG/DL
HCT VFR BLD AUTO: 39.5 %
HGB BLD-MCNC: 13.1 G/DL
IRON SERPL-MCNC: 115 UG/DL
MCH RBC QN AUTO: 27.2 PG
MCHC RBC AUTO-ENTMCNC: 33.2 G/DL
MCV RBC AUTO: 82 FL
PLATELET # BLD AUTO: 253 K/UL
PMV BLD AUTO: 10.8 FL
POTASSIUM SERPL-SCNC: 4.3 MMOL/L
RBC # BLD AUTO: 4.81 M/UL
SATURATED IRON: 41 %
SODIUM SERPL-SCNC: 140 MMOL/L
TOTAL IRON BINDING CAPACITY: 281 UG/DL
TRANSFERRIN SERPL-MCNC: 190 MG/DL
TSH SERPL DL<=0.005 MIU/L-ACNC: 1.57 UIU/ML
VIT B12 SERPL-MCNC: 377 PG/ML
WBC # BLD AUTO: 7.85 K/UL

## 2018-04-06 PROCEDURE — 80048 BASIC METABOLIC PNL TOTAL CA: CPT

## 2018-04-06 PROCEDURE — 82306 VITAMIN D 25 HYDROXY: CPT

## 2018-04-06 PROCEDURE — 85027 COMPLETE CBC AUTOMATED: CPT

## 2018-04-06 PROCEDURE — 36415 COLL VENOUS BLD VENIPUNCTURE: CPT | Mod: PO

## 2018-04-06 PROCEDURE — 84443 ASSAY THYROID STIM HORMONE: CPT

## 2018-04-06 PROCEDURE — 83540 ASSAY OF IRON: CPT

## 2018-04-06 PROCEDURE — 99214 OFFICE O/P EST MOD 30 MIN: CPT | Mod: S$GLB,,, | Performed by: REGISTERED NURSE

## 2018-04-06 PROCEDURE — 82728 ASSAY OF FERRITIN: CPT

## 2018-04-06 PROCEDURE — 99999 PR PBB SHADOW E&M-EST. PATIENT-LVL V: CPT | Mod: PBBFAC,,, | Performed by: REGISTERED NURSE

## 2018-04-06 PROCEDURE — 82607 VITAMIN B-12: CPT

## 2018-04-07 ENCOUNTER — PATIENT MESSAGE (OUTPATIENT)
Dept: FAMILY MEDICINE | Facility: CLINIC | Age: 28
End: 2018-04-07

## 2018-04-10 ENCOUNTER — TELEPHONE (OUTPATIENT)
Dept: FAMILY MEDICINE | Facility: CLINIC | Age: 28
End: 2018-04-10

## 2018-04-10 ENCOUNTER — PATIENT MESSAGE (OUTPATIENT)
Dept: FAMILY MEDICINE | Facility: CLINIC | Age: 28
End: 2018-04-10

## 2018-04-10 DIAGNOSIS — E55.9 VITAMIN D DEFICIENCY DISEASE: ICD-10-CM

## 2018-04-10 PROBLEM — R79.89 LOW VITAMIN B12 LEVEL: Status: RESOLVED | Noted: 2017-06-30 | Resolved: 2018-04-10

## 2018-04-10 RX ORDER — CEPHRADINE 500 MG
10000 CAPSULE ORAL WEEKLY
Qty: 4 EACH | Refills: 6 | Status: SHIPPED | OUTPATIENT
Start: 2018-04-10 | End: 2019-01-17 | Stop reason: ALTCHOICE

## 2018-04-10 NOTE — TELEPHONE ENCOUNTER
Labs reviewed --- contact  please.    Okay --- B12, iron, thyroid    Vitamin-D is low --- RX sent to pharmacy for supplement.  Recheck in 3 mo.

## 2018-04-16 ENCOUNTER — TELEPHONE (OUTPATIENT)
Dept: FAMILY MEDICINE | Facility: CLINIC | Age: 28
End: 2018-04-16

## 2018-04-16 DIAGNOSIS — E66.3 OVERWEIGHT (BMI 25.0-29.9): Primary | ICD-10-CM

## 2018-04-17 ENCOUNTER — TELEPHONE (OUTPATIENT)
Dept: FAMILY MEDICINE | Facility: CLINIC | Age: 28
End: 2018-04-17

## 2018-04-17 ENCOUNTER — PATIENT MESSAGE (OUTPATIENT)
Dept: PAIN MEDICINE | Facility: CLINIC | Age: 28
End: 2018-04-17

## 2018-04-17 ENCOUNTER — TELEPHONE (OUTPATIENT)
Dept: DIABETES | Facility: CLINIC | Age: 28
End: 2018-04-17

## 2018-04-17 NOTE — TELEPHONE ENCOUNTER
Notified patient of referral denial spoke with  and offered patient cash option for visit.  denied cash payment.

## 2018-04-25 ENCOUNTER — OFFICE VISIT (OUTPATIENT)
Dept: PAIN MEDICINE | Facility: CLINIC | Age: 28
End: 2018-04-25
Payer: COMMERCIAL

## 2018-04-25 VITALS
WEIGHT: 148 LBS | RESPIRATION RATE: 18 BRPM | BODY MASS INDEX: 27.94 KG/M2 | DIASTOLIC BLOOD PRESSURE: 64 MMHG | HEIGHT: 61 IN | SYSTOLIC BLOOD PRESSURE: 108 MMHG | HEART RATE: 85 BPM

## 2018-04-25 DIAGNOSIS — M47.817 LUMBOSACRAL SPONDYLOSIS WITHOUT MYELOPATHY: ICD-10-CM

## 2018-04-25 DIAGNOSIS — M54.50 CHRONIC LOW BACK PAIN WITHOUT SCIATICA, UNSPECIFIED BACK PAIN LATERALITY: ICD-10-CM

## 2018-04-25 DIAGNOSIS — G89.29 CHRONIC THORACIC BACK PAIN, UNSPECIFIED BACK PAIN LATERALITY: ICD-10-CM

## 2018-04-25 DIAGNOSIS — M70.62 GREATER TROCHANTERIC BURSITIS OF BOTH HIPS: ICD-10-CM

## 2018-04-25 DIAGNOSIS — M70.61 GREATER TROCHANTERIC BURSITIS OF BOTH HIPS: ICD-10-CM

## 2018-04-25 DIAGNOSIS — M79.18 MYOFASCIAL PAIN: ICD-10-CM

## 2018-04-25 DIAGNOSIS — G89.29 CHRONIC LOW BACK PAIN WITHOUT SCIATICA, UNSPECIFIED BACK PAIN LATERALITY: ICD-10-CM

## 2018-04-25 DIAGNOSIS — M54.6 CHRONIC THORACIC BACK PAIN, UNSPECIFIED BACK PAIN LATERALITY: ICD-10-CM

## 2018-04-25 DIAGNOSIS — M54.12 RADICULOPATHY OF CERVICAL SPINE: Primary | ICD-10-CM

## 2018-04-25 PROCEDURE — 99999 PR PBB SHADOW E&M-EST. PATIENT-LVL IV: CPT | Mod: PBBFAC,,, | Performed by: PHYSICIAN ASSISTANT

## 2018-04-25 PROCEDURE — 99214 OFFICE O/P EST MOD 30 MIN: CPT | Mod: S$GLB,,, | Performed by: PHYSICIAN ASSISTANT

## 2018-04-25 RX ORDER — METHOCARBAMOL 500 MG/1
500 TABLET, FILM COATED ORAL 2 TIMES DAILY PRN
Qty: 60 TABLET | Refills: 1 | Status: SHIPPED | OUTPATIENT
Start: 2018-04-25 | End: 2019-01-07

## 2018-04-25 NOTE — LETTER
April 25, 2018      Kieran Cardenas, NP  [address]  [facility] [address]           [facility]  [id number] [place] [address]  Phone: [phone]  Fax: [phone]          Patient: Karlee Guevara   MR Number: [id number]   YOB: 1990   Date of Visit: 4/25/2018       Dear Kieran Cardenas:    Thank you for referring Karlee Guevara to me for evaluation. Attached you will find relevant portions of my assessment and plan of care.    If you have questions, please do not hesitate to call me. I look forward to following Karlee Guevara along with you.    Sincerely,    BRENTON Holman  CC:  No Recipients    If you would like to receive this communication electronically, please contact [email or web address] or [phone] to request more information on [facility] Link access.    For providers and/or their staff who would like to refer a patient to [facility], please contact us through our one-stop-shop provider referral line, [facility], at [phone].    If you feel you have received this communication in error or would no longer like to receive these types of communications, please e-mail [email or web address]

## 2018-04-25 NOTE — PROGRESS NOTES
Chief Pain Complaint:  Neck pain, right arm pain, mid and low back pain    History of Present Illness:   This patient is a 28 y.o. female who presents today complaining of the above noted pain/s. The patient describes the pain as follows.    - duration of pain: 8 years   - timing: intermittent   - character: aching, sharp  - radiating, dermatomal: extends into right arm, not strictly dermatomal, thoracic and lumbar pain is nonradiating  - antecedent trauma, prior spinal surgery: no prior trauma, no prior spinal surgery   - pertinent negatives: No fever, No chills, No weight loss, No bladder dysfunction, No bowel dysfunction, No saddle anesthesia  - pertinent positives: right arm weakness    - medications, other therapies tried (physical therapy, injections):     >> Tylenol    >> Has previously undergone Physical Therapy, only underwent a couple of sessions of PT, also tried chiropractic care    >> Has NOT previously undergone spinal injection/s      Imaging / Labs / Studies (reviewed on 4/25/2018):     9/06/17 MRI Cervical Spine Without Contrast  Narrative: Exam: MRI of the cervical spine without contrast.  History:  Neck pain, right arm pain. Radiculopathy, cervical region.  Findings:     Alignment is normal. The cervical cord demonstrates normal morphology and signal. The craniocervical junction is unremarkable.  Small central disc protrusion at C5-6, without significant mass effect on the thecal sac. Remainder of the intervertebral discs demonstrate normal morphology and signal. Central canal and neural foramina are widely patent.  Impression:  Small central disc protrusion at C5-6 without significant mass effect. Otherwise unremarkable exam.       8/29/17 Thoracic X-ray  AP and lateral views of the thoracic spine  Findings: The vertebral bodies demonstrate normal height and alignment.  The pedicles are intact.  The disc space heights are well-maintained.       8/29/17 Lumbar x-ray  Seven views of the lumbar  "spine including flexion and extension views.  Findings: The vertebral bodies demonstrate normal height.  The alignment is within normal limits. The disk space heights are maintained. Mild facet arthropathy suspected at L5-S1 level bilaterally. No pars defects.No listhesis noted on the flexion or extension views.           Review of Systems:  CONSTITUTIONAL: patient denies any fever, chills, or weight loss  SKIN: patient denies any rash or itching  RESPIRATORY: patient denies having any shortness of breath  GASTROINTESTINAL: patient denies having any diarrhea, constipation, or bowel incontinence  GENITOURINARY: patient denies having any abnormal bladder function    MUSCULOSKELETAL:  - patient complains of the above noted pain/s (see chief pain complaint)    NEUROLOGICAL:   - pain as above  - strength in Upper extremities is decreased, on the RIGHT  - sensation in Upper extremities is abnormal, on the RIGHT  - patient denies any loss of bowel or bladder control      PSYCHIATRIC: patient denies any change in mood    Other:  All other systems reviewed and are negative        Physical Exam:  /64 (BP Location: Right arm, Patient Position: Sitting, BP Method: Medium (Automatic))   Pulse 85   Resp 18   Ht 5' 1" (1.549 m)   Wt 67.1 kg (148 lb)   BMI 27.96 kg/m²    (reviewed on 4/25/2018)    General: alert and oriented, in no apparent distress  Gait: normal gait  Skin: No rashes, No discoloration, No obvious lesions  HEENT: EOMI  Cardiovascular: no significant peripheral edema present  Respiratory: respirations nonlabored    Musculoskeletal:  - Any pain on flexion, extension, rotation:    >> pain on extension and rotation of the lumbar spine    >> mild right side pain on extension and rotation of the C-spine    >> lumbar facet loading causes pain bilaterally   - Spurling's Test: causes No radiating pain  - Any tenderness to palpation across paraspinal muscles, joints, bursae:     >> No pain across thoracic     >> " mild pain over lumbar paraspinals    >> mild tenderness along right side cervical paraspinals into right upper trapezius    >> TTP over right GT bursa    Neuro:  - Upper Extremity Strength:     >> LEFT :: 5/5    >> RIGHT :: 5/5  - Upper Extremity Reflexes:    >> LEFT  :: 2+    >> RIGHT :: 2+     Psych:  Mood and affect is appropriate        Assessment:  Karlee Guevara is a 28 y.o. female who presents with    ICD-10-CM ICD-9-CM   1. Radiculopathy of cervical spine M54.12 723.4   2. Chronic thoracic back pain, unspecified back pain laterality M54.6 724.1    G89.29 338.29   3. Myofascial pain M79.1 729.1   4. Chronic low back pain without sciatica, unspecified back pain laterality M54.5 724.2    G89.29 338.29   5. Greater trochanteric bursitis of both hips M70.61 726.5    M70.62    6. Lumbosacral spondylosis without myelopathy M47.817 721.3     Patient presents today complaining of intermittent right side neck pain with extension into the right arm, along with nonradiating thoracic and lumbar pain. She has had this pain for years.  Again pain comes and goes, she has the pain primarily with increased activity. Pain appears largely myofascial.      Plan:  1. Interventional: Schedule bilateral L3-5 MBB + right GT bursa injection with local.    2. Pharmacologic:   - Start Robaxin 500mg BID PRN.   - Consider gabapentin.    3. Rehabilitative:  - Encouraged regular exercise.   - Consider PT.    4. Diagnostic: None for now.    5. Follow up: PRN after injection.    - I discussed the risks, benefits, and alternatives to potential treatment options. All questions and concerns were fully addressed today in clinic. Dr. Garcia was consulted regarding the patient plan and agrees.

## 2018-05-02 ENCOUNTER — PATIENT MESSAGE (OUTPATIENT)
Dept: CARDIOLOGY | Facility: HOSPITAL | Age: 28
End: 2018-05-02

## 2018-05-09 ENCOUNTER — PATIENT MESSAGE (OUTPATIENT)
Dept: CARDIOLOGY | Facility: HOSPITAL | Age: 28
End: 2018-05-09

## 2018-05-10 ENCOUNTER — PATIENT MESSAGE (OUTPATIENT)
Dept: PAIN MEDICINE | Facility: CLINIC | Age: 28
End: 2018-05-10

## 2018-05-11 ENCOUNTER — HOSPITAL ENCOUNTER (OUTPATIENT)
Dept: RADIOLOGY | Facility: HOSPITAL | Age: 28
Discharge: HOME | End: 2018-05-11
Attending: PHYSICIAN ASSISTANT | Admitting: ANESTHESIOLOGY
Payer: COMMERCIAL

## 2018-05-11 ENCOUNTER — HOSPITAL ENCOUNTER (OUTPATIENT)
Facility: HOSPITAL | Age: 28
Discharge: HOME | End: 2018-05-11
Attending: ANESTHESIOLOGY | Admitting: ANESTHESIOLOGY
Payer: COMMERCIAL

## 2018-05-11 ENCOUNTER — SURGERY (OUTPATIENT)
Age: 28
End: 2018-05-11

## 2018-05-11 VITALS
OXYGEN SATURATION: 98 % | TEMPERATURE: 98 F | HEIGHT: 61 IN | DIASTOLIC BLOOD PRESSURE: 66 MMHG | BODY MASS INDEX: 27.94 KG/M2 | SYSTOLIC BLOOD PRESSURE: 141 MMHG | RESPIRATION RATE: 18 BRPM | WEIGHT: 148 LBS | HEART RATE: 98 BPM

## 2018-05-11 DIAGNOSIS — M47.817 LUMBOSACRAL SPONDYLOSIS WITHOUT MYELOPATHY: ICD-10-CM

## 2018-05-11 DIAGNOSIS — M70.62 GREATER TROCHANTERIC BURSITIS OF BOTH HIPS: ICD-10-CM

## 2018-05-11 DIAGNOSIS — M70.61 GREATER TROCHANTERIC BURSITIS OF BOTH HIPS: ICD-10-CM

## 2018-05-11 PROCEDURE — 63600175 PHARM REV CODE 636 W HCPCS: Performed by: ANESTHESIOLOGY

## 2018-05-11 PROCEDURE — 64495 INJ PARAVERT F JNT L/S 3 LEV: CPT | Mod: 50,,, | Performed by: ANESTHESIOLOGY

## 2018-05-11 PROCEDURE — 64494 INJ PARAVERT F JNT L/S 2 LEV: CPT | Mod: 50,,, | Performed by: ANESTHESIOLOGY

## 2018-05-11 PROCEDURE — 64493 INJ PARAVERT F JNT L/S 1 LEV: CPT | Mod: 50

## 2018-05-11 PROCEDURE — 25000003 PHARM REV CODE 250: Performed by: ANESTHESIOLOGY

## 2018-05-11 PROCEDURE — 64495 INJ PARAVERT F JNT L/S 3 LEV: CPT | Mod: 50

## 2018-05-11 PROCEDURE — 64493 INJ PARAVERT F JNT L/S 1 LEV: CPT | Mod: 50,,, | Performed by: ANESTHESIOLOGY

## 2018-05-11 PROCEDURE — 64494 INJ PARAVERT F JNT L/S 2 LEV: CPT | Mod: 50

## 2018-05-11 RX ORDER — METHYLPREDNISOLONE ACETATE 40 MG/ML
INJECTION, SUSPENSION INTRA-ARTICULAR; INTRALESIONAL; INTRAMUSCULAR; SOFT TISSUE
Status: DISCONTINUED | OUTPATIENT
Start: 2018-05-11 | End: 2018-05-11 | Stop reason: HOSPADM

## 2018-05-11 RX ORDER — LIDOCAINE HYDROCHLORIDE 20 MG/ML
INJECTION, SOLUTION EPIDURAL; INFILTRATION; INTRACAUDAL; PERINEURAL
Status: DISCONTINUED | OUTPATIENT
Start: 2018-05-11 | End: 2018-05-11 | Stop reason: HOSPADM

## 2018-05-11 RX ADMIN — METHYLPREDNISOLONE ACETATE 160 MG: 40 INJECTION, SUSPENSION INTRA-ARTICULAR; INTRALESIONAL; INTRAMUSCULAR; SOFT TISSUE at 04:05

## 2018-05-11 RX ADMIN — LIDOCAINE HYDROCHLORIDE 5 ML: 20 INJECTION, SOLUTION EPIDURAL; INFILTRATION; INTRACAUDAL; PERINEURAL at 04:05

## 2018-05-11 NOTE — OP NOTE
Procedure: Lumbar Medial Branch Block under Fluoroscopic Guidance    Side: bilateral     Level:  Sacral ala (Corresponding to the L5 dorsal ramus), L5 transverse process (Corresponding to the L4 medial branch) and L4 transverse process (Corresponding to the L3 medial branch)     PROCEDURE DATE: 5/11/2018    Pre-operative Diagnosis: Lumbar Spondylosis  Post-operative Diagnosis: Lumbar Spondylosis    Provider: Dane Garcia MD  Assistant(s): none    Anesthesia: Local, No Sedation    >> 0 mg of VERSED    >> 0 mcg of FENTANYL     Indication: Low back pain without radiculopathy. Symptoms unresponsive to conservative treatments. Fluoroscopy was used to optimize visualization of needle placement and to maximize safety.     Procedure Description / Technique:  The patient was seen and identified in the preoperative area. Risks, benefits, complications, and alternatives were discussed with the patient. The patient agreed to proceed with the procedure and signed the consent. The site and side of the procedure was identified and marked. No iv was started.     The patient was taken to the procedural suite and positioned in prone orientation on the procedure table. A pillow was placed under the abdomen to reduce lumbar lordosis. A time out was performed. The procedure, site, side, and allergies were stated and agreed to by all present. The lumbosacral area was widely prepped with ChloraPrep. The procedural site was draped in usual sterile fashion. Vital signs were closely monitored throughout this procedure. Conscious sedation was NOT used for this procedure.    The Right sacral ala and superior articular process was identified and marked on AP fluoroscopic imaging. Subcutaneous tissues were localized using 1% PF Lidocaine to improve patient comfort. A 25 gauge 3.5 inch spinal needle was advance until the needle rested on OS at the interface of the sacral ala and the base of the sacral superior articular process. After negative  "aspiration, 1 mL of a solution containing 4 mL of 1% PF Lidocaine and 2 mL of Methylprednisolone (40 mg/mL) was injected. No pain or paresthesia was noted on injection. After right side injection, the fluoroscope was obliqued to the Right until the ruth dog outline of the L5 vertebrae came into view. The spinal needle was advanced to the "eye of the ruth dog" and after negative aspiration a 1 mL of the above noted solution was injected. No pain or paresthesia was noted. This technique was repeated at each of the above noted levels. The spinal needle was removed intact following injection at each targeted site. The stylet was replaced prior to needle removal at each site.    The Left sacral ala and superior articular process was identified and marked on AP fluoroscopic imaging. Subcutaneous tissues were localized using 1% PF Lidocaine to improve patient comfort. A 25 gauge 3.5 inch spinal needle was advance until the needle rested on OS at the interface of the sacral ala and the base of the sacral superior articular process. After negative aspiration, 1 mL of a solution containing 4 mL of 1% PF Lidocaine and 2 mL of Methylprednisolone (40 mg/mL) was injected. No pain or paresthesia was noted on injection. After left side injection, the fluoroscope was obliqued to the Left until the ruth dog outline of the L5 vertebrae came into view. The spinal needle was advanced to the "eye of the ruth dog" and after negative aspiration a 1 mL of the above noted solution was injected. No pain or paresthesia was noted. This technique was repeated at each of the above noted levels. The spinal needle was removed intact following injection at each targeted site. The stylet was replaced prior to needle removal at each site.    Description of Findings: Not applicable    Prosthetic devices, grafts, tissues, or devices implanted: None    Specimen Removed: No    ESTIMATED BLOOD LOSS: minimal    COMPLICATIONS: None    DISPOSITION " / PLANS: The patient was transferred to the recovery area in a stable condition for observation. The patient was reexamined prior to discharge. There was no evidence of acute neurologic injury following the procedure.  Patient was discharged from the recovery room after meeting discharge criteria. Home discharge instructions were given to the patient by the staff.

## 2018-05-11 NOTE — DISCHARGE SUMMARY
[facility]  Discharge Note       Description of Procedure: Bilateral L3, L4, L5 Lumbar Medial Branch Block under Fluoroscopic Guidance    Procedure Date: 5/11/2018    Admit Date: 5/11/2018  Discharge Date: 5/11/2018     Attending Physician: Jose Vela   Discharge Provider: Jose Vela    Preoperative Diagnosis: Lumbar Spondylosis, Lumbar Facet Arthropathy     Postoperative Diagnosis: as above, same as preoperative diagnosis    Discharged Condition: Stable    Hospital Course: Patient was admitted for an outpatient procedure. The procedure was tolerated well with no complications.    Final Diagnoses: Same as principal problem.    Disposition: Home, self-care.    Follow up/Patient Instructions:  Follow-up in clinic in 2-3 weeks.    Medications: No medications were prescribed today. The patient was advised to resume normal medication regimen without change.  Specific information was provided regarding restarting any anticoagulant/s.    Discharge Procedure Orders (must include Diet, Follow-up, Activity):  Light activity for the remainder of the day, resume normal activity tomorrow. Resume normal diet. Follow-up in clinic in 2-3 weeks.

## 2018-05-13 ENCOUNTER — PATIENT MESSAGE (OUTPATIENT)
Dept: PAIN MEDICINE | Facility: CLINIC | Age: 28
End: 2018-05-13

## 2018-05-28 ENCOUNTER — OFFICE VISIT (OUTPATIENT)
Dept: PSYCHIATRY | Facility: CLINIC | Age: 28
End: 2018-05-28
Payer: COMMERCIAL

## 2018-05-28 DIAGNOSIS — F32.A DEPRESSIVE DISORDER: Primary | ICD-10-CM

## 2018-05-28 DIAGNOSIS — F41.9 ANXIETY DISORDER, UNSPECIFIED TYPE: ICD-10-CM

## 2018-05-28 PROCEDURE — 90791 PSYCH DIAGNOSTIC EVALUATION: CPT | Mod: S$GLB,,, | Performed by: SOCIAL WORKER

## 2018-06-18 ENCOUNTER — OFFICE VISIT (OUTPATIENT)
Dept: PSYCHIATRY | Facility: CLINIC | Age: 28
End: 2018-06-18
Payer: COMMERCIAL

## 2018-06-18 DIAGNOSIS — F41.8 ANXIOUS DEPRESSION: Primary | ICD-10-CM

## 2018-06-18 PROCEDURE — 90834 PSYTX W PT 45 MINUTES: CPT | Mod: S$GLB,,, | Performed by: SOCIAL WORKER

## 2018-06-29 ENCOUNTER — OFFICE VISIT (OUTPATIENT)
Dept: OPHTHALMOLOGY | Facility: CLINIC | Age: 28
End: 2018-06-29
Payer: COMMERCIAL

## 2018-06-29 ENCOUNTER — LAB VISIT (OUTPATIENT)
Dept: LAB | Facility: HOSPITAL | Age: 28
End: 2018-06-29
Attending: REGISTERED NURSE
Payer: COMMERCIAL

## 2018-06-29 ENCOUNTER — OFFICE VISIT (OUTPATIENT)
Dept: FAMILY MEDICINE | Facility: CLINIC | Age: 28
End: 2018-06-29
Payer: COMMERCIAL

## 2018-06-29 VITALS
BODY MASS INDEX: 28.72 KG/M2 | DIASTOLIC BLOOD PRESSURE: 64 MMHG | RESPIRATION RATE: 20 BRPM | SYSTOLIC BLOOD PRESSURE: 100 MMHG | HEIGHT: 61 IN | HEART RATE: 98 BPM | WEIGHT: 152.13 LBS | OXYGEN SATURATION: 97 % | TEMPERATURE: 98 F

## 2018-06-29 DIAGNOSIS — E55.9 VITAMIN D DEFICIENCY DISEASE: ICD-10-CM

## 2018-06-29 DIAGNOSIS — H18.831 RCE (RECURRENT CORNEAL EROSION), RIGHT: Primary | ICD-10-CM

## 2018-06-29 DIAGNOSIS — E55.9 VITAMIN D DEFICIENCY DISEASE: Primary | ICD-10-CM

## 2018-06-29 LAB — 25(OH)D3+25(OH)D2 SERPL-MCNC: 18 NG/ML

## 2018-06-29 PROCEDURE — 99999 PR PBB SHADOW E&M-EST. PATIENT-LVL III: CPT | Mod: PBBFAC,,, | Performed by: REGISTERED NURSE

## 2018-06-29 PROCEDURE — 82306 VITAMIN D 25 HYDROXY: CPT

## 2018-06-29 PROCEDURE — 99213 OFFICE O/P EST LOW 20 MIN: CPT | Mod: S$GLB,,, | Performed by: REGISTERED NURSE

## 2018-06-29 PROCEDURE — 36415 COLL VENOUS BLD VENIPUNCTURE: CPT | Mod: PO

## 2018-06-29 PROCEDURE — 92012 INTRM OPH EXAM EST PATIENT: CPT | Mod: S$GLB,,, | Performed by: OPTOMETRIST

## 2018-06-29 PROCEDURE — 3008F BODY MASS INDEX DOCD: CPT | Mod: CPTII,S$GLB,, | Performed by: REGISTERED NURSE

## 2018-06-29 PROCEDURE — 99999 PR PBB SHADOW E&M-EST. PATIENT-LVL I: CPT | Mod: PBBFAC,,, | Performed by: OPTOMETRIST

## 2018-06-29 NOTE — LETTER
June 29, 2018      Althea Fu MD  [address]  [facility] [address]           [facility]  [address] [place] [id number]  Phone: [phone]  Fax: [phone]          Patient: Karlee Guevara   MR Number: [id number]   YOB: 1990   Date of Visit: 6/29/2018       Dear Dr. Althea Fu:    Thank you for referring Karlee Guevara to me for evaluation. Attached you will find relevant portions of my assessment and plan of care.    If you have questions, please do not hesitate to call me. I look forward to following Karlee Guevara along with you.    Sincerely,    Keshav Vargas, OD    Enclosure  CC:  No Recipients    If you would like to receive this communication electronically, please contact [email or web address] or [phone] to request more information on [facility] Link access.    For providers and/or their staff who would like to refer a patient to [facility], please contact us through our one-stop-shop provider referral line, [facility], at [phone].    If you feel you have received this communication in error or would no longer like to receive these types of communications, please e-mail [email or web address]

## 2018-07-01 ENCOUNTER — PATIENT MESSAGE (OUTPATIENT)
Dept: FAMILY MEDICINE | Facility: CLINIC | Age: 28
End: 2018-07-01

## 2018-07-01 NOTE — PROGRESS NOTES
Psychiatry Initial Visit (PhD/LCSW)  Diagnostic Interview - CPT 19185    Date: 5/28/2018    Site: [facility]    Referral source: self-referred, family    Clinical status of patient: Outpatient    Karlee Guevara, a 28 y.o. female, for initial evaluation visit.  Met with patient and patient's . Also used [facility]  services for a portion of the session.  Patient's  translated for her for part.      Chief complaint/reason for encounter: depression, suicidal ideation and anxiety    History of present illness:   28 year old female patient native of [place] presented accompanied by her , Herman.  Patient with minimal English ability; some modest English comprehension but little confidence speaking it.  Patient indicated a desire to have her  translate, though she did agree to us contacting language line  services and hearing their services explained.  She also agreed to use their services for portions of sessions going forward to talk without her  present for part of the sessions.  Patient's chief presenting complaint was a combination of depression and anxiety.  She endorsed depressive symptoms over the past year to year and a half, but described as somewhat worse a year ago.  Patient became a firsttime new mother in late 2016.  Reporting increasing feelings of loneliness and helplessness since moving to this country.  Patient reported she is college educated, with a double major in business and public administration.  She reported several months history of fear of driving; she will ride as a passenger in cars but is afraid to drive, though she has previous driving experience.  Also reported frequent tearfulness, feeling over-sensitive, easily triggered to tears.  Also fearful of being alone.  Reported pervasive sadness, hopelessness, and catastrophizing ruminating thoughts.  Feeling intensely dependent on her  for everything, and  experiencing obsessive thoughts of something unspecified but horrible happening to him.  Stated she has a long-time enjoyment of watching horror movies but now finds many of her anxious thoughts center around images from different horror movies.  She endorsed a family of origin history of her father having pervasive severe anxiety.  Patient's  is a , and his work brought them to [place].  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  Patient described health as manageable now; experienced gestational diabetes in 2016.  Feels still fatigued; suffers from degenerative disc disease and bilateral bursitis in her hips.  Denied any suicidal or homicidal ideation.  Denied any psychosis, cognitive deficit, or substance abuse.  Identified therapeutic goals include reduction in anxiety and depression, improvement of coping skills and improving sense of hope and goals for her future.      Pain: chronic, described as moderate, not numerically quantified    Symptoms:   · Mood: depressed mood, insomnia, fatigue, worthlessness/guilt, tearfulness and social isolation  · Anxiety: excessive anxiety/worry, restlessness/keyed up, agoraphobia and post-traumatic stress  · Substance abuse: denied  · Cognitive functioning: denied  · Health behaviors: noncontributory    Psychiatric history: none    Medical history: degenerative disc disease; bursitis of both hips.  Pregnancy and birth of child by  in late 2016.     Family history of psychiatric illness: father described as chronically very anxious.     Social history (marriage, employment, etc.):  Patient born and raised in [facility].  Youngest of three siblings, with a sister 7 years older and a brother 9 years older.  Mother was a housewife; father a market merchant.  Patient was a straight A student until changing schools in the 8th grade.  She described some bullying experiences in the 8th grade that shook her confidence, and even  after changing schools she felt a lack of confidence.  She reported that no one in the new school seemed aware of the bullying or ugly rumors that had effected her in the earlier school.  Patient [place].  Raised observant Christian; parents encouraged her education.  She completed secondary school and finished a college degree, with a double major in business and public administration.    Herman, slightly older than herself; they relocated after college to the [place], following opportunity for his career.  's English is fluent.  He holds a masters degree and teaches middle school students.  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      Substance use:   Alcohol: none   Drugs: none   Tobacco: none   Caffeine: none    Current medications and drug reactions (include OTC, herbal): see medication list      Strengths and liabilities: Strength: Patient accepts guidance/feedback, Strength: Patient is intelligent., Strength: Patient is motivated for change., Strength: Patient has positive support network., Liability: Patient is dependent., Liability: Patient lacks coping skills.    Current Evaluation:     Mental Status Exam:  General Appearance:  age appropriate, casually dressed, neatly groomed   Speech: normal tone, normal rate, normal pitch, normal volume      Level of Cooperation: cooperative      Thought Processes: goal-directed   Mood: anxious, depressed      Thought Content: normal, no suicidality, no homicidality, delusions, or paranoia   Affect: congruent and appropriate   Orientation: Oriented x3   Memory: recent and remote memory intact   Attention Span & Concentration: intact   Fund of General Knowledge: intact and appropriate to age and level of education   Abstract Reasoning: not formally assessed   Judgment & Insight: limited     Language  intelligent and expressive in her native language, but minimal English  fluency, requiring  service.     Diagnostic Impression - Plan:       ICD-10-CM ICD-9-CM   1. Depressive disorder F32.9 311   2. Anxiety disorder, unspecified type F41.9 300.00       Plan:individual psychotherapy and medication management by physician; feedback from family    Return to Clinic: as scheduled    Length of Service (minutes): 45

## 2018-07-05 ENCOUNTER — OFFICE VISIT (OUTPATIENT)
Dept: PAIN MEDICINE | Facility: CLINIC | Age: 28
End: 2018-07-05
Payer: COMMERCIAL

## 2018-07-05 ENCOUNTER — TELEPHONE (OUTPATIENT)
Dept: FAMILY MEDICINE | Facility: CLINIC | Age: 28
End: 2018-07-05

## 2018-07-05 VITALS
RESPIRATION RATE: 16 BRPM | DIASTOLIC BLOOD PRESSURE: 61 MMHG | HEART RATE: 86 BPM | SYSTOLIC BLOOD PRESSURE: 97 MMHG | WEIGHT: 152 LBS | BODY MASS INDEX: 28.7 KG/M2 | HEIGHT: 61 IN

## 2018-07-05 DIAGNOSIS — M47.816 LUMBAR SPONDYLOSIS: Primary | ICD-10-CM

## 2018-07-05 DIAGNOSIS — M47.814 SPONDYLOSIS OF THORACIC REGION WITHOUT MYELOPATHY OR RADICULOPATHY: ICD-10-CM

## 2018-07-05 DIAGNOSIS — M47.816 LUMBAR FACET ARTHROPATHY: ICD-10-CM

## 2018-07-05 DIAGNOSIS — M54.16 LUMBAR RADICULOPATHY: ICD-10-CM

## 2018-07-05 PROCEDURE — 3008F BODY MASS INDEX DOCD: CPT | Mod: CPTII,S$GLB,, | Performed by: ANESTHESIOLOGY

## 2018-07-05 PROCEDURE — 99214 OFFICE O/P EST MOD 30 MIN: CPT | Mod: S$GLB,,, | Performed by: ANESTHESIOLOGY

## 2018-07-05 PROCEDURE — 99999 PR PBB SHADOW E&M-EST. PATIENT-LVL III: CPT | Mod: PBBFAC,,, | Performed by: ANESTHESIOLOGY

## 2018-07-05 NOTE — PROGRESS NOTES
Chief Pain Complaint:  Neck pain, right arm pain, mid and low back pain    Interval History: The patient was last seen 5/11/2018. At that time she underwent Bilateral L3, L4, L5 Lumbar Medial Branch Block under Fluoroscopic Guidance. The patient reports that  she is/was better following the procedure. She had 80% improvement in pain with functional improvement. The changes lasted 2 months. The changes have continued through this visit.        History of Present Illness:   This patient is a 28 y.o. female who presents today complaining of the above noted pain/s. The patient describes the pain as follows.    - duration of pain: 8 years   - timing: intermittent   - character: aching, sharp  - radiating, dermatomal: extends into right arm, not strictly dermatomal, thoracic and lumbar pain is nonradiating  - antecedent trauma, prior spinal surgery: no prior trauma, no prior spinal surgery   - pertinent negatives: No fever, No chills, No weight loss, No bladder dysfunction, No bowel dysfunction, No saddle anesthesia  - pertinent positives: right arm weakness    - medications, other therapies tried (physical therapy, injections):     >> Tylenol    >> Has previously undergone Physical Therapy, only underwent a couple of sessions of PT, also tried chiropractic care    >> Has NOT previously undergone spinal injection/s      Imaging / Labs / Studies (reviewed on 7/5/2018):     9/06/17 MRI Cervical Spine Without Contrast  Narrative: Exam: MRI of the cervical spine without contrast.  History:  Neck pain, right arm pain. Radiculopathy, cervical region.  Findings:     Alignment is normal. The cervical cord demonstrates normal morphology and signal. The craniocervical junction is unremarkable.  Small central disc protrusion at C5-6, without significant mass effect on the thecal sac. Remainder of the intervertebral discs demonstrate normal morphology and signal. Central canal and neural foramina are widely patent.  Impression:   "Small central disc protrusion at C5-6 without significant mass effect. Otherwise unremarkable exam.       8/29/17 Thoracic X-ray  AP and lateral views of the thoracic spine  Findings: The vertebral bodies demonstrate normal height and alignment.  The pedicles are intact.  The disc space heights are well-maintained.       8/29/17 Lumbar x-ray  Seven views of the lumbar spine including flexion and extension views.  Findings: The vertebral bodies demonstrate normal height.  The alignment is within normal limits. The disk space heights are maintained. Mild facet arthropathy suspected at L5-S1 level bilaterally. No pars defects.No listhesis noted on the flexion or extension views.           Review of Systems:  CONSTITUTIONAL: patient denies any fever, chills, or weight loss  SKIN: patient denies any rash or itching  RESPIRATORY: patient denies having any shortness of breath  GASTROINTESTINAL: patient denies having any diarrhea, constipation, or bowel incontinence  GENITOURINARY: patient denies having any abnormal bladder function    MUSCULOSKELETAL:  - patient complains of the above noted pain/s (see chief pain complaint)    NEUROLOGICAL:   - pain as above  - strength in Upper extremities is decreased, on the RIGHT  - sensation in Upper extremities is abnormal, on the RIGHT  - patient denies any loss of bowel or bladder control      PSYCHIATRIC: patient denies any change in mood    Other:  All other systems reviewed and are negative        Physical Exam:  BP 97/61 (BP Location: Right arm, Patient Position: Sitting, BP Method: Medium (Automatic))   Pulse 86   Resp 16   Ht 5' 1" (1.549 m)   Wt 68.9 kg (152 lb)   BMI 28.72 kg/m²    (reviewed on 7/5/2018)    General: alert and oriented, in no apparent distress  Gait: normal gait  Skin: No rashes, No discoloration, No obvious lesions  HEENT: EOMI  Cardiovascular: no significant peripheral edema present  Respiratory: respirations nonlabored  Musculoskeletal:    Cervical " Spine    - Pain on flexion of cervical spine Absent  - Spurling's Test:  Absent    - Pain on extension of cervical spine Absent  - TTP over the cervical facet joints Absent  - Cervical facet loading Absent  -TTP over bilateral trapezius muscles.     Lumbar Spine    - Pain on flexion of lumbar spine Absent  - Straight Leg Raise:  Absent    - Pain on extension of lumbar spine Present  - TTP over the lumbar facet joints Present  - Lumbar facet loading Present    -Pain on palpation over the SI joint  Absent  - MONY: Absent    Thoracic Spine    - Pain on flexion of Thoracic spine Absent  - Pain on extension of Thoracic spine Present  - TTP over the Thoracic facet joints Present  - Thoracic facet loading Absent     -Pain on palpation over the SI joint  Absent  - MONY: Absent      Neuro:    Strength:  UE R/L: D: 5/5; B: 5/5; T: 5/5; WF: 5/5; WE: 5/5; IO: 5/5;  LE R/L: HF: 5/5, HE: 5/5, KF: 5/5; KE: 5/5; FE: 5/5; FF: 5/5    Extremity Reflexes: Brisk and symmetric throughout.      Extremity Sensory: Sensation to pinprick and temperature symmetric. Proprioception intact.      Psych:  Mood and affect is appropriate      Assessment:  Karlee Guevara is a 28 y.o. female who presents with    ICD-10-CM ICD-9-CM   1. Lumbar spondylosis M47.816 721.3   2. Lumbar facet arthropathy M46.96 721.3   3. Lumbar radiculopathy M54.16 724.4     Patient presents today complaining of intermittent right side neck pain with extension into the right arm, along with nonradiating thoracic and lumbar pain. She has had this pain for years.  Again pain comes and goes, she has the pain primarily with increased activity. Pain appears largely myofascial.      Plan:  1. Interventional: None for now.    2. Pharmacologic: Start Compound cream. Continue Robaxin 500 mg BID PRN.     3. Rehabilitative:Encouraged regular exercise.     4. Diagnostic: Lumbar MRI to rule of lumbar radiculopathy     5. Follow up: 4 weeks.

## 2018-07-06 NOTE — TELEPHONE ENCOUNTER
Called and spoke with pts  regarding lab results. He verbalized understanding. Will give information to pt. Lab work order needed for the recheck in 6 months?

## 2018-07-09 NOTE — PROGRESS NOTES
"Subjective:       Patient ID: Karlee Guevara is a 28 y.o. female.    Chief Complaint: Vitamin D Deficiency      HPI    Karlee is here today with her  to translate.  Requesting recheck on Vitamin-D level today; however, she has only taken 1 month of supplement.  Feeling slightly better, current dose 10,000 IU once a week.  She has been evaluated by Dr. Garcia and was told she had "spinal inflammation".  Possible cause for her leg and heel pain.  Did get a steroid injection, will f/u with him 1 month s/p injection for updated txmt plan.     Advised  and PT that it may be too soon to recheck lab today after only 1 month of D-supplement, but they still want lab to be rechecked today.      Review of Systems   Respiratory: Negative.    Cardiovascular: Negative.    Musculoskeletal: Positive for myalgias.   Neurological: Positive for weakness (slight impv). Negative for light-headedness, numbness and headaches.         Patient Active Problem List   Diagnosis    Overweight (BMI 25.0-29.9)    Vitamin D deficiency disease    Greater trochanteric bursitis of both hips       Past medical, surgical, family and social histories have been reviewed today.        Objective:     Vitals:    06/29/18 0822   BP: 100/64   BP Location: Right arm   Patient Position: Sitting   Pulse: 98   Resp: 20   Temp: 97.8 °F (36.6 °C)   TempSrc: Tympanic   SpO2: 97%   Weight: 69 kg (152 lb 1.9 oz)   Height: 5' 1" (1.549 m)       Physical Exam   Constitutional: She is oriented to person, place, and time. She appears well-developed and well-nourished.   Musculoskeletal: Normal range of motion. She exhibits no edema, tenderness or deformity.   Neurological: She is alert and oriented to person, place, and time.   Skin: Skin is warm and dry. Capillary refill takes less than 2 seconds. No rash noted.   Vitals reviewed.        Medication List with Changes/Refills   Current Medications    CHOLECALCIFEROL, VITAMIN D3, 10,000 UNIT CAP    " Take 1 capsule (10,000 Units total) by mouth once a week.    METHOCARBAMOL (ROBAXIN) 500 MG TAB    Take 1 tablet (500 mg total) by mouth 2 (two) times daily as needed (muscle spasms).    OMEPRAZOLE (PRILOSEC) 10 MG CAPSULE    Take 10 mg by mouth once daily.           Diagnosis       1. Vitamin D deficiency disease          Assessment/ Plan     Vitamin D deficiency disease  -     Vitamin D; Future; Expected date: 06/29/2018        Turmeric 1 gm daily may help.  Further txmt plan pending lab result.          BARBARA Martin  [facility]

## 2018-07-10 ENCOUNTER — OFFICE VISIT (OUTPATIENT)
Dept: PSYCHIATRY | Facility: CLINIC | Age: 28
End: 2018-07-10
Payer: COMMERCIAL

## 2018-07-10 DIAGNOSIS — Z63.0 MARITAL STRESS: ICD-10-CM

## 2018-07-10 DIAGNOSIS — F41.8 ANXIOUS DEPRESSION: Primary | ICD-10-CM

## 2018-07-10 DIAGNOSIS — E55.9 VITAMIN D DEFICIENCY DISEASE: Primary | ICD-10-CM

## 2018-07-10 PROCEDURE — 90834 PSYTX W PT 45 MINUTES: CPT | Mod: S$GLB,,, | Performed by: SOCIAL WORKER

## 2018-07-10 SDOH — SOCIAL DETERMINANTS OF HEALTH (SDOH): PROBLEMS IN RELATIONSHIP WITH SPOUSE OR PARTNER: Z63.0

## 2018-07-10 NOTE — PROGRESS NOTES
Individual Psychotherapy (PhD/LCSW)    6/18/2018    Site:  [facility]         Therapeutic Intervention: Met with patient and spouse.  Outpatient - Insight oriented psychotherapy 45 min - CPT code 21505 and Outpatient - Supportive psychotherapy 45 min - CPT Code 72046    Chief complaint/reason for encounter: depression and anxiety     Interval history and content of current session:   (Late entry for 6/18/18)  28 year old female patient returned for second psychotherapy session.  She was accompanied by her  for the first 15 minutes of the session.  He then left the wait in the waiting room.  [facility] telephone  services were used to translate, as the patient is native [facility] and does not yet speak confidently with English.  Patient spoke some more of her formative years history; [place] [facility] [place], [place] partway through her childhood.  She is the youngest of 3, with a brother 9 years older and a sister 7 years older.  Mother was and is a housewife; father a market merchant.  She said she was a straight A student until changing schools in the 8th grade, when she encountered some social problems at school that weighed on her, were stressful, and started to negatively effect her academics.  She talked about some bullying experience, with a peer spreading rumors about her after she had simply made a comment about finding a certain boy at school attractive.  This was highly distressing to her.  She felt socially ostracized by numerous peers.  She reported she eventually changed schools, and the rumors stopped, but she remained socially anxious and felt isolated through the rest of grade school.  Speaking of her present life situation, after [place] the [place] with her , Herman, a , she began to feel increasingly isolated and dependent.  She is fearful whenever her  is away; depends on him for language  interpreting, for driving her anywhere she might go, and for general problem-solving.  Patient is college educated but feels limited by lack of English language facility.  She is working on learning English and has developed a modicum of comprehension skill but is very much still learning.  Patient talked about her fear of driving, saying she is okay as a passenger but freezes up behind the wheel.  This despite having driven back in [place].  Patient goals remain to feel less anxious and less isolated; also to work through feelings of shame about past experiences she is still bothered by.  Patient denied any si/hi, psychosis, cognitive deficit, or substance abuse.  Supportive therapy provided.   Follow up scheduled session on 7/10/18.      Treatment plan:  · Target symptoms: depression, anxiety , adjustment  · Why chosen therapy is appropriate versus another modality: relevant to diagnosis, patient responds to this modality  · Outcome monitoring methods: self-report, observation  · Therapeutic intervention type: insight oriented psychotherapy, supportive psychotherapy    Risk parameters:  Patient reports no suicidal ideation  Patient reports no homicidal ideation  Patient reports no self-injurious behavior  Patient reports no violent behavior    Verbal deficits: None    Patient's response to intervention:  The patient's response to intervention is accepting.    Progress toward goals and other mental status changes:  The patient's progress toward goals is limited.    Diagnosis:     ICD-10-CM ICD-9-CM   1. Anxious depression F41.9 300.00    F32.9 311       Plan:  individual psychotherapy and family psychotherapy    Return to clinic: as scheduled    Length of Service (minutes): 45

## 2018-07-10 NOTE — PROGRESS NOTES
Individual Psychotherapy (PhD/LCSW)    7/10/2018    [facility]         Therapeutic Intervention: Met with patient and spouse.  Outpatient - Insight oriented psychotherapy 45 min - CPT code 16756 and Outpatient - Supportive psychotherapy 45 min - CPT Code 29687    Chief complaint/reason for encounter: depression and anxiety     Interval history and content of current session:   28 year old female patient returned for second psychotherapy session.  She was accompanied by her , whom she asked to stay for the first two thirds of the session but requested he not be in the remains 15 minutes.  We utilized the [facility]  services.  Patient talked about her anxiety as being pervasive through many different situations and issues.  Besides fear of driving, she said that she is questioning and doubting her parenting skills but also feeling resentful of her  when he tries to tell her anything.  There is sometimes some lack of distinction between her  voicing his personal opinion and him stating something as fact that he asserts she misunderstands.  She endorsed feeling at times resentful of him, even as she depends on him for much in her present life situation.  He is the sole income  for the household, her English , her , and often her source of interpretation of news and current events.  Patient endorsed feeling frustrated with her toddler at times, thinking she needs more patience and wondering if anti-anxiety medication would help her with that.  Discussed option of patient pursuing psychiatry medication management.  Patient denied any si/hi, psychosis, cognitive deficit, or substance abuse.  Supportive therapy provided.   Follow up scheduled session on 8/14/18.      Treatment plan:  · Target symptoms: depression, anxiety , adjustment  · Why chosen therapy is appropriate versus another modality: relevant to diagnosis, patient responds to this  modality  · Outcome monitoring methods: self-report, observation  · Therapeutic intervention type: insight oriented psychotherapy, supportive psychotherapy    Risk parameters:  Patient reports no suicidal ideation  Patient reports no homicidal ideation  Patient reports no self-injurious behavior  Patient reports no violent behavior    Verbal deficits: None    Patient's response to intervention:  The patient's response to intervention is accepting.    Progress toward goals and other mental status changes:  The patient's progress toward goals is limited.    Diagnosis:     ICD-10-CM ICD-9-CM   1. Anxious depression F41.9 300.00    F32.9 311   2. Marital stress Z63.0 V61.10       Plan:  individual psychotherapy and family psychotherapy    Return to clinic: as scheduled    Length of Service (minutes): 45

## 2018-07-11 ENCOUNTER — HOSPITAL ENCOUNTER (OUTPATIENT)
Dept: RADIOLOGY | Facility: HOSPITAL | Age: 28
Discharge: HOME | End: 2018-07-11
Attending: ANESTHESIOLOGY
Payer: COMMERCIAL

## 2018-07-11 DIAGNOSIS — M54.16 LUMBAR RADICULOPATHY: ICD-10-CM

## 2018-07-11 DIAGNOSIS — M47.816 LUMBAR SPONDYLOSIS: ICD-10-CM

## 2018-07-11 PROCEDURE — 72148 MRI LUMBAR SPINE W/O DYE: CPT | Mod: TC

## 2018-07-26 ENCOUNTER — OFFICE VISIT (OUTPATIENT)
Dept: PAIN MEDICINE | Facility: CLINIC | Age: 28
End: 2018-07-26
Payer: COMMERCIAL

## 2018-07-26 VITALS
WEIGHT: 152 LBS | SYSTOLIC BLOOD PRESSURE: 103 MMHG | BODY MASS INDEX: 28.7 KG/M2 | DIASTOLIC BLOOD PRESSURE: 66 MMHG | HEART RATE: 79 BPM | HEIGHT: 61 IN | RESPIRATION RATE: 16 BRPM

## 2018-07-26 DIAGNOSIS — M47.816 LUMBAR FACET ARTHROPATHY: ICD-10-CM

## 2018-07-26 DIAGNOSIS — M79.18 MYOFASCIAL MUSCLE PAIN: Primary | ICD-10-CM

## 2018-07-26 DIAGNOSIS — M47.816 LUMBAR SPONDYLOSIS: ICD-10-CM

## 2018-07-26 PROCEDURE — 99214 OFFICE O/P EST MOD 30 MIN: CPT | Mod: S$GLB,,, | Performed by: ANESTHESIOLOGY

## 2018-07-26 PROCEDURE — 3008F BODY MASS INDEX DOCD: CPT | Mod: CPTII,S$GLB,, | Performed by: ANESTHESIOLOGY

## 2018-07-26 PROCEDURE — 99999 PR PBB SHADOW E&M-EST. PATIENT-LVL III: CPT | Mod: PBBFAC,,, | Performed by: ANESTHESIOLOGY

## 2018-07-26 RX ORDER — DICLOFENAC SODIUM 10 MG/G
4 GEL TOPICAL 3 TIMES DAILY PRN
Qty: 2 TUBE | Refills: 2 | Status: SHIPPED | OUTPATIENT
Start: 2018-07-26 | End: 2021-10-17

## 2018-07-26 RX ORDER — LIDOCAINE 50 MG/G
OINTMENT TOPICAL
Qty: 2 TUBE | Refills: 2 | Status: SHIPPED | OUTPATIENT
Start: 2018-07-26 | End: 2019-01-07

## 2018-07-26 NOTE — PROGRESS NOTES
Chief Pain Complaint:  Neck pain, right arm pain, mid and low back pain    History of Present Illness:   This patient is a 28 y.o. female who presents today complaining of the above noted pain/s. The patient describes the pain as follows.    - duration of pain: 8 years   - timing: intermittent   - character: aching, sharp  - radiating, dermatomal: extends into right arm, not strictly dermatomal, thoracic and lumbar pain is nonradiating  - antecedent trauma, prior spinal surgery: no prior trauma, no prior spinal surgery   - pertinent negatives: No fever, No chills, No weight loss, No bladder dysfunction, No bowel dysfunction, No saddle anesthesia  - pertinent positives: right arm weakness    - medications, other therapies tried (physical therapy, injections):     >> Tylenol    >> Has previously undergone Physical Therapy, only underwent a couple of sessions of PT, also tried chiropractic care    >> Has previously undergone spinal injection/s Bilateral L3, L4, L5 Lumbar Medial Branch Block with 80% relief for > 3 months       Imaging / Labs / Studies (reviewed on 7/26/2018):     9/06/17 MRI Cervical Spine Without Contrast  Narrative: Exam: MRI of the cervical spine without contrast.  History:  Neck pain, right arm pain. Radiculopathy, cervical region.  Findings:     Alignment is normal. The cervical cord demonstrates normal morphology and signal. The craniocervical junction is unremarkable.  Small central disc protrusion at C5-6, without significant mass effect on the thecal sac. Remainder of the intervertebral discs demonstrate normal morphology and signal. Central canal and neural foramina are widely patent.  Impression:  Small central disc protrusion at C5-6 without significant mass effect. Otherwise unremarkable exam.       8/29/17 Thoracic X-ray  AP and lateral views of the thoracic spine  Findings: The vertebral bodies demonstrate normal height and alignment.  The pedicles are intact.  The disc space heights are  "well-maintained.       8/29/17 Lumbar x-ray  Seven views of the lumbar spine including flexion and extension views.  Findings: The vertebral bodies demonstrate normal height.  The alignment is within normal limits. The disk space heights are maintained. Mild facet arthropathy suspected at L5-S1 level bilaterally. No pars defects.No listhesis noted on the flexion or extension views.           Review of Systems:  CONSTITUTIONAL: patient denies any fever, chills, or weight loss  SKIN: patient denies any rash or itching  RESPIRATORY: patient denies having any shortness of breath  GASTROINTESTINAL: patient denies having any diarrhea, constipation, or bowel incontinence  GENITOURINARY: patient denies having any abnormal bladder function    MUSCULOSKELETAL:  - patient complains of the above noted pain/s (see chief pain complaint)    NEUROLOGICAL:   - pain as above  - strength in Upper extremities is decreased, on the RIGHT  - sensation in Upper extremities is abnormal, on the RIGHT  - patient denies any loss of bowel or bladder control      PSYCHIATRIC: patient denies any change in mood    Other:  All other systems reviewed and are negative        Physical Exam:  /66 (BP Location: Right arm, Patient Position: Sitting, BP Method: Medium (Automatic))   Pulse 79   Resp 16   Ht 5' 1" (1.549 m)   Wt 68.9 kg (152 lb)   BMI 28.72 kg/m²    (reviewed on 7/26/2018)    General: alert and oriented, in no apparent distress  Gait: normal gait  Skin: No rashes, No discoloration, No obvious lesions  HEENT: EOMI  Cardiovascular: no significant peripheral edema present  Respiratory: respirations nonlabored  Musculoskeletal:    Cervical Spine    - Pain on flexion of cervical spine Absent  - Spurling's Test:  Absent    - Pain on extension of cervical spine Absent  - TTP over the cervical facet joints Absent  - Cervical facet loading Absent  -TTP over bilateral trapezius muscles.     Lumbar Spine    - Pain on flexion of lumbar spine " Absent  - Straight Leg Raise:  Absent    - Pain on extension of lumbar spine Present  - TTP over the lumbar facet joints Present  - Lumbar facet loading Present    -Pain on palpation over the SI joint  Absent  - MONY: Absent    Thoracic Spine    - Pain on flexion of Thoracic spine Absent  - Pain on extension of Thoracic spine Present  - TTP over the Thoracic facet joints Present  - Thoracic facet loading Absent     -Pain on palpation over the SI joint  Absent  - MONY: Absent      Neuro:    Strength:  UE R/L: D: 5/5; B: 5/5; T: 5/5; WF: 5/5; WE: 5/5; IO: 5/5;  LE R/L: HF: 5/5, HE: 5/5, KF: 5/5; KE: 5/5; FE: 5/5; FF: 5/5    Extremity Reflexes: Brisk and symmetric throughout.      Extremity Sensory: Sensation to pinprick and temperature symmetric. Proprioception intact.      Psych:  Mood and affect is appropriate      Assessment:  Karlee Guevara is a 28 y.o. female who presents with    ICD-10-CM ICD-9-CM   1. Myofascial muscle pain M79.1 729.1   2. Lumbar spondylosis M47.816 721.3   3. Lumbar facet arthropathy M46.96 721.3     Patient presents today complaining of intermittent right side neck pain with extension into the right arm, along with nonradiating thoracic and lumbar pain. She has had this pain for years.  Again pain comes and goes, she has the pain primarily with increased activity. Pain appears largely myofascial.      Plan:  1. Interventional: None for now.    2. Pharmacologic: Compound cream not covered. Start Voltaren gel and Lidocaine gel. Continue Robaxin 500 mg BID PRN.     3. Rehabilitative: Internal Referral to PT.    4. Diagnostic: Reviewed lumbar MRI with patient.     5. Follow up: PRN.

## 2018-08-27 NOTE — PROGRESS NOTES
"Subjective:      Patient ID: Karlee Guevara is a 27 y.o. female.    Chief Complaint: Establish Care      Past Medical History:   Diagnosis Date    GERD (gastroesophageal reflux disease)      Past Surgical History:   Procedure Laterality Date     SECTION       History reviewed. No pertinent family history.  Social History     Social History    Marital status:      Spouse name: N/A    Number of children: N/A    Years of education: N/A     Occupational History    Not on file.     Social History Main Topics    Smoking status: Never Smoker    Smokeless tobacco: Never Used    Alcohol use No    Drug use: No    Sexual activity: Yes     Other Topics Concern    Not on file     Social History Narrative    No narrative on file       Current Outpatient Prescriptions:     omeprazole (PRILOSEC) 10 MG capsule, Take 10 mg by mouth once daily., Disp: , Rfl:   Review of patient's allergies indicates:  No Known Allergies    Review of Systems   Constitutional: Negative for activity change, chills and fever.   Respiratory: Negative for shortness of breath and wheezing.    Cardiovascular: Negative for chest pain.   Musculoskeletal: Positive for arthralgias, back pain and myalgias. Negative for gait problem and joint swelling.   Neurological: Negative for syncope and speech difficulty.     HPI  Here today to establish care.  Main concern is right arm that is weak and has pain radiating from upper arm to hand at times.  This has occurred intermittently for the past few years.  Worsened over the past few months again.  She has a 10 month old and does a lot of lifting/cleaning.  Right wrist is weak at times and right  strength.   Does not take any otc meds b/c it is not "that bad."  , Annabelle, here today - .  She also has intermittent low back pain starting during pregnancy.  H/o low iron and gestational diabetes.    Objective:   /60 (BP Location: Right arm, Patient Position: " "Sitting, BP Method: Manual)   Pulse 100   Temp 98.1 °F (36.7 °C) (Tympanic)   Resp 18   Ht 5' 1.25" (1.556 m)   Wt 65.8 kg (145 lb 1 oz)   LMP 05/01/2017 (Approximate)   SpO2 98%   Breastfeeding? Unknown   BMI 27.19 kg/m²      Physical Exam   Constitutional: She is oriented to person, place, and time. She is cooperative. No distress.   HENT:   Right Ear: Hearing, tympanic membrane and ear canal normal.   Left Ear: Hearing, tympanic membrane and ear canal normal.   Eyes: Conjunctivae and EOM are normal.   Cardiovascular: Normal rate, regular rhythm and normal heart sounds.    Pulmonary/Chest: Effort normal and breath sounds normal.   Musculoskeletal: She exhibits no edema.   Right arm - ++ spasms of trapezius; full range of motion; radial pulse +2; normal sensation; negative tinel's; pain improved with massage of trapezius   Neurological: She is alert and oriented to person, place, and time. No cranial nerve deficit. Coordination and gait normal.   Skin: Skin is warm, dry and intact. No rash noted. She is not diaphoretic. No erythema.   Psychiatric: She has a normal mood and affect. Her speech is normal and behavior is normal.   Nursing note and vitals reviewed.          Assessment:       1. Left arm pain    2. Weakness of wrist    3. Chronic low back pain without sciatica, unspecified back pain laterality    4. History of iron deficiency    5. History of gestational diabetes            Plan:         Left arm pain  Comments:  Declines medication.  Pain/numbness radiating down arm from neck to hand intermittently for years.  Has 10 month old.  Send to PT.  Orders:  -     Ambulatory consult to Physical Therapy    Weakness of wrist  Comments:  Due to radiculopathy - if no improvement over the next 4-6 weeks, rtc.  Orders:  -     Ambulatory consult to Physical Therapy    Chronic low back pain without sciatica, unspecified back pain laterality  Comments:  Low back pain started with pregnancy.  Intermittent and " worse with increased activity.  PT for strengthening exercises.  Orders:  -     Ambulatory consult to Physical Therapy    History of iron deficiency  Comments:  Check labs - h/o iron def and possibly b 12 - as well as gestational diabetes that could be contriubting to paresthesias of arm.  Orders:  -     Vitamin B12; Future; Expected date: 06/29/2017    History of gestational diabetes  -     TSH; Future; Expected date: 06/29/2017  -     Vitamin D; Future; Expected date: 06/29/2017  -     Lipid panel; Future; Expected date: 06/29/2017  -     Hemoglobin A1c; Future; Expected date: 06/29/2017  -     Comprehensive metabolic panel; Future; Expected date: 06/29/2017  -     CBC auto differential; Future; Expected date: 06/29/2017  -     Iron and TIBC; Future; Expected date: 06/29/2017        Patient Care Team:  Primary Doctor No as PCP - General   113

## 2018-09-01 ENCOUNTER — PATIENT MESSAGE (OUTPATIENT)
Dept: PAIN MEDICINE | Facility: CLINIC | Age: 28
End: 2018-09-01

## 2018-09-10 ENCOUNTER — TELEPHONE (OUTPATIENT)
Dept: PAIN MEDICINE | Facility: CLINIC | Age: 28
End: 2018-09-10

## 2018-09-10 NOTE — TELEPHONE ENCOUNTER
----- Message from Tolu Valentin sent at 9/10/2018  2:37 PM CDT -----  Contact: Marya dick-[phone]  Would like a new order for physical therapy. Please fax order to [phone]. Please call back at [phone].  Thx-AH

## 2018-11-02 ENCOUNTER — OFFICE VISIT (OUTPATIENT)
Dept: PAIN MEDICINE | Facility: CLINIC | Age: 28
End: 2018-11-02
Payer: COMMERCIAL

## 2018-11-02 VITALS
DIASTOLIC BLOOD PRESSURE: 63 MMHG | BODY MASS INDEX: 28.31 KG/M2 | WEIGHT: 149.94 LBS | SYSTOLIC BLOOD PRESSURE: 103 MMHG | HEART RATE: 87 BPM | HEIGHT: 61 IN

## 2018-11-02 DIAGNOSIS — M47.816 LUMBAR SPONDYLOSIS: Primary | ICD-10-CM

## 2018-11-02 DIAGNOSIS — M47.22 OSTEOARTHRITIS OF SPINE WITH RADICULOPATHY, CERVICAL REGION: ICD-10-CM

## 2018-11-02 DIAGNOSIS — M47.816 LUMBAR FACET ARTHROPATHY: ICD-10-CM

## 2018-11-02 PROCEDURE — 99999 PR PBB SHADOW E&M-EST. PATIENT-LVL III: CPT | Mod: PBBFAC,,, | Performed by: ANESTHESIOLOGY

## 2018-11-02 PROCEDURE — 3008F BODY MASS INDEX DOCD: CPT | Mod: CPTII,S$GLB,, | Performed by: ANESTHESIOLOGY

## 2018-11-02 PROCEDURE — 99214 OFFICE O/P EST MOD 30 MIN: CPT | Mod: S$GLB,,, | Performed by: ANESTHESIOLOGY

## 2018-11-02 RX ORDER — GABAPENTIN 300 MG/1
CAPSULE ORAL
Qty: 90 CAPSULE | Refills: 0 | Status: SHIPPED | OUTPATIENT
Start: 2018-11-02 | End: 2019-01-07

## 2018-11-02 NOTE — PROGRESS NOTES
Chief Pain Complaint:  Neck pain, right arm pain, mid and low back pain    History of Present Illness:   This patient is a 28 y.o. female who presents today complaining of the above noted pain/s. The patient describes the pain as follows.    - duration of pain: 8 years   - timing: intermittent   - character: aching, sharp  - radiating, dermatomal: extends into right arm, not strictly dermatomal, thoracic and lumbar pain is nonradiating  - antecedent trauma, prior spinal surgery: no prior trauma, no prior spinal surgery   - pertinent negatives: No fever, No chills, No weight loss, No bladder dysfunction, No bowel dysfunction, No saddle anesthesia  - pertinent positives: right arm weakness    - medications, other therapies tried (physical therapy, injections):     >> Tylenol    >> Has previously undergone Physical Therapy, only underwent a couple of sessions of PT, also tried chiropractic care    >> Has previously undergone spinal injection/s Bilateral L3, L4, L5 Lumbar Medial Branch Block with 80% relief for > 3 months       Imaging / Labs / Studies (reviewed on 11/2/2018):     9/06/17 MRI Cervical Spine Without Contrast  Narrative: Exam: MRI of the cervical spine without contrast.  History:  Neck pain, right arm pain. Radiculopathy, cervical region.  Findings:     Alignment is normal. The cervical cord demonstrates normal morphology and signal. The craniocervical junction is unremarkable.  Small central disc protrusion at C5-6, without significant mass effect on the thecal sac. Remainder of the intervertebral discs demonstrate normal morphology and signal. Central canal and neural foramina are widely patent.  Impression:  Small central disc protrusion at C5-6 without significant mass effect. Otherwise unremarkable exam.       8/29/17 Thoracic X-ray  AP and lateral views of the thoracic spine  Findings: The vertebral bodies demonstrate normal height and alignment.  The pedicles are intact.  The disc space heights are  "well-maintained.       8/29/17 Lumbar x-ray  Seven views of the lumbar spine including flexion and extension views.  Findings: The vertebral bodies demonstrate normal height.  The alignment is within normal limits. The disk space heights are maintained. Mild facet arthropathy suspected at L5-S1 level bilaterally. No pars defects.No listhesis noted on the flexion or extension views.           Review of Systems:  CONSTITUTIONAL: patient denies any fever, chills, or weight loss  SKIN: patient denies any rash or itching  RESPIRATORY: patient denies having any shortness of breath  GASTROINTESTINAL: patient denies having any diarrhea, constipation, or bowel incontinence  GENITOURINARY: patient denies having any abnormal bladder function    MUSCULOSKELETAL:  - patient complains of the above noted pain/s (see chief pain complaint)    NEUROLOGICAL:   - pain as above  - strength in Upper extremities is decreased, on the RIGHT  - sensation in Upper extremities is abnormal, on the RIGHT  - patient denies any loss of bowel or bladder control      PSYCHIATRIC: patient denies any change in mood    Other:  All other systems reviewed and are negative        Physical Exam:  /63 (BP Location: Right arm, Patient Position: Sitting)   Pulse 87   Ht 5' 1" (1.549 m)   Wt 68 kg (149 lb 14.6 oz)   BMI 28.33 kg/m²    (reviewed on 11/2/2018)    General: alert and oriented, in no apparent distress  Gait: normal gait  Skin: No rashes, No discoloration, No obvious lesions  HEENT: EOMI  Cardiovascular: no significant peripheral edema present  Respiratory: respirations nonlabored  Musculoskeletal:    Cervical Spine    - Pain on flexion of cervical spine Absent  - Spurling's Test:  Absent    - Pain on extension of cervical spine Absent  - TTP over the cervical facet joints Absent  - Cervical facet loading Absent  -TTP over bilateral trapezius muscles.     Lumbar Spine    - Pain on flexion of lumbar spine Absent  - Straight Leg Raise:  " Absent    - Pain on extension of lumbar spine Present  - TTP over the lumbar facet joints Present  - Lumbar facet loading Present    -Pain on palpation over the SI joint  Absent  - MONY: Absent    Thoracic Spine    - Pain on flexion of Thoracic spine Absent  - Pain on extension of Thoracic spine Present  - TTP over the Thoracic facet joints Present  - Thoracic facet loading Absent     -Pain on palpation over the SI joint  Absent  - MONY: Absent      Neuro:    Strength:  UE R/L: D: 5/5; B: 5/5; T: 5/5; WF: 5/5; WE: 5/5; IO: 5/5;  LE R/L: HF: 5/5, HE: 5/5, KF: 5/5; KE: 5/5; FE: 5/5; FF: 5/5    Extremity Reflexes: Brisk and symmetric throughout.      Extremity Sensory: Sensation to pinprick and temperature symmetric. Proprioception intact.      Psych:  Mood and affect is appropriate      Assessment:  Karlee Guevara is a 28 y.o. female who presents with    ICD-10-CM ICD-9-CM   1. Lumbar spondylosis M47.816 721.3   2. Lumbar facet arthropathy M47.816 721.3   3. Osteoarthritis of spine with radiculopathy, cervical region M47.22 721.0     Patient presents today complaining of intermittent right side neck pain with extension into the right arm, along with nonradiating thoracic and lumbar pain. She has had this pain for years.  Again pain comes and goes, she has the pain primarily with increased activity. Pain appears largely myofascial.      Plan:  1. Interventional: None for now. Consider Right C5-6 TFESI if pain not improved with conservative measures.     2. Pharmacologic: Start Gabapentin 300 mg Po Q hs with up titration. Continue Voltaren gel and Lidocaine gel. Continue Robaxin 500 mg BID PRN.     3. Rehabilitative: Patient is s/p PT.    4. Diagnostic: Reviewed lumbar and cervical MRI with patient.     5. Follow up: 4 weeks.

## 2019-01-07 ENCOUNTER — OFFICE VISIT (OUTPATIENT)
Dept: FAMILY MEDICINE | Facility: CLINIC | Age: 29
End: 2019-01-07
Payer: COMMERCIAL

## 2019-01-07 ENCOUNTER — LAB VISIT (OUTPATIENT)
Dept: LAB | Facility: HOSPITAL | Age: 29
End: 2019-01-07
Attending: REGISTERED NURSE
Payer: COMMERCIAL

## 2019-01-07 VITALS
OXYGEN SATURATION: 98 % | SYSTOLIC BLOOD PRESSURE: 110 MMHG | RESPIRATION RATE: 18 BRPM | WEIGHT: 155.19 LBS | HEIGHT: 60 IN | BODY MASS INDEX: 30.47 KG/M2 | DIASTOLIC BLOOD PRESSURE: 58 MMHG

## 2019-01-07 DIAGNOSIS — M25.50 MULTIPLE JOINT PAIN: ICD-10-CM

## 2019-01-07 DIAGNOSIS — G56.01 CARPAL TUNNEL SYNDROME OF RIGHT WRIST: ICD-10-CM

## 2019-01-07 DIAGNOSIS — E66.9 OBESITY (BMI 30-39.9): ICD-10-CM

## 2019-01-07 DIAGNOSIS — E55.9 VITAMIN D DEFICIENCY DISEASE: ICD-10-CM

## 2019-01-07 DIAGNOSIS — Z00.00 ANNUAL PHYSICAL EXAM: ICD-10-CM

## 2019-01-07 DIAGNOSIS — Z00.00 ANNUAL PHYSICAL EXAM: Primary | ICD-10-CM

## 2019-01-07 LAB
CHOLEST SERPL-MCNC: 200 MG/DL
CHOLEST/HDLC SERPL: 4.9 {RATIO}
HDLC SERPL-MCNC: 41 MG/DL
HDLC SERPL: 20.5 %
LDLC SERPL CALC-MCNC: 105.6 MG/DL
NONHDLC SERPL-MCNC: 159 MG/DL
TRIGL SERPL-MCNC: 267 MG/DL

## 2019-01-07 PROCEDURE — 99999 PR PBB SHADOW E&M-EST. PATIENT-LVL III: CPT | Mod: PBBFAC,,, | Performed by: REGISTERED NURSE

## 2019-01-07 PROCEDURE — 99395 PREV VISIT EST AGE 18-39: CPT | Mod: S$GLB,,, | Performed by: REGISTERED NURSE

## 2019-01-07 PROCEDURE — 36415 COLL VENOUS BLD VENIPUNCTURE: CPT | Mod: PO

## 2019-01-07 PROCEDURE — 99395 PR PREVENTIVE VISIT,EST,18-39: ICD-10-PCS | Mod: S$GLB,,, | Performed by: REGISTERED NURSE

## 2019-01-07 PROCEDURE — 99999 PR PBB SHADOW E&M-EST. PATIENT-LVL III: ICD-10-PCS | Mod: PBBFAC,,, | Performed by: REGISTERED NURSE

## 2019-01-07 PROCEDURE — 80061 LIPID PANEL: CPT

## 2019-01-09 ENCOUNTER — PATIENT MESSAGE (OUTPATIENT)
Dept: FAMILY MEDICINE | Facility: CLINIC | Age: 29
End: 2019-01-09

## 2019-01-10 ENCOUNTER — TELEPHONE (OUTPATIENT)
Dept: FAMILY MEDICINE | Facility: CLINIC | Age: 29
End: 2019-01-10

## 2019-01-10 DIAGNOSIS — E78.5 DYSLIPIDEMIA: ICD-10-CM

## 2019-01-10 NOTE — TELEPHONE ENCOUNTER
Lipids abnormal with elevated levels ---- I don't want to start her on medication at this time b/c she is already dealilng with a good bit of joint and muscle pain from other issues.  Statin can make worse or cause this problem.  Want her to focus on healthy diet and exercise, weight loss recommended.  Will recheck lipids again in 6 mo.

## 2019-01-13 PROBLEM — G56.01 CARPAL TUNNEL SYNDROME OF RIGHT WRIST: Status: ACTIVE | Noted: 2019-01-13

## 2019-01-14 NOTE — PROGRESS NOTES
Subjective:       Patient ID: Karlee Guevara is a 28 y.o. female.    Chief Complaint: Annual Exam      HPI    Karlee Guevara is here today for an annual wellness exam.   here to translate, small child present for exam.  I have reviewed the patient's medical history in detail and updated the computerized patient record.      She has been followed by Dr. Kramer for her chronic back issues, IMANI did not help.  He wants to burn a nerve in her back but she is not sure she wants to have this done.  She has also had EMG/NCS done due to chronic pain in the RT wrist, showed a CTS or peripheral nerve disorder.  He has since referred her to Dr. Ulloa at [facility] since he is a hand specialist.  Does continue with joint pain to multiple areas.  Taking Vitamin-D 10,000 IU once a week for deficiency.  She has not found any relief with Turmeric as suggested.    Did have labs done recently at [facility] ordered per Dr. Kramer and they bring in copy for me to review ---- dated 12/21/2018:  · Vitamin-B12 --- normal at 321  · Vitamin-D --- low at 20.4  · CBC --- normal   · BMP --- normal  · SANJAY --- 0.62  · A1c --- 5.3 %  · R-Factor --- < 15  · ESR --- elevated at 21  · TSH --- 1.562  · Uric acid --- 3.5  · Protein electrophoresis --- normal  · Protein --- 7.6        Review of Systems   Constitutional: Negative for activity change, appetite change, chills, diaphoresis, fatigue, fever and unexpected weight change.        EXERCISE:  None.  DIET:  Healthy, increased fruits and vegatables, protein.  Follows a vegan diet.   HENT: Negative.    Eyes: Negative.    Respiratory: Negative.    Cardiovascular: Negative for chest pain, palpitations and leg swelling.   Gastrointestinal: Negative.    Endocrine: Negative for polydipsia, polyphagia and polyuria.   Genitourinary: Negative.    Musculoskeletal: Positive for arthralgias and back pain. Negative for gait problem, neck pain and neck stiffness.   Skin:  "Negative.    Neurological: Negative.    Hematological: Negative.    Psychiatric/Behavioral: Negative.          Review of patient's allergies indicates:  No Known Allergies    Current Outpatient Medications on File Prior to Visit   Medication Sig Dispense Refill    cholecalciferol, vitamin D3, 10,000 unit Cap Take 1 capsule (10,000 Units total) by mouth once a week. 4 each 6    diclofenac sodium 1 % Gel Apply 4 g topically 3 (three) times daily as needed. 2 Tube 2     No current facility-administered medications on file prior to visit.        Patient Active Problem List   Diagnosis    Vitamin D deficiency disease    Greater trochanteric bursitis of both hips    Dyslipidemia       Past Medical History:   Diagnosis Date    GERD (gastroesophageal reflux disease)        Past Surgical History:   Procedure Laterality Date     SECTION         History reviewed. No pertinent family history.      Social History     Socioeconomic History    Marital status:      Spouse name: Annabelle    Number of children: 1   Occupational History    Occupation: Unemployed   Tobacco Use    Smoking status: Never Smoker    Smokeless tobacco: Never Used   Substance and Sexual Activity    Alcohol use: No    Drug use: No    Sexual activity: Yes     Partners: Male             Objective:     Vitals:    19 1613   BP: (!) 110/58   Resp: 18   SpO2: 98%   Weight: 70.4 kg (155 lb 3.3 oz)   Height: 4' 11.84" (1.52 m)         Physical Exam   Constitutional: She is oriented to person, place, and time. She appears well-developed and well-nourished. No distress.   HENT:   Head: Normocephalic and atraumatic.   Right Ear: External ear normal.   Left Ear: External ear normal.   Nose: Nose normal.   Mouth/Throat: Oropharynx is clear and moist.   Eyes: Conjunctivae and EOM are normal. Pupils are equal, round, and reactive to light. Right eye exhibits no discharge. Left eye exhibits no discharge. No scleral icterus.   Neck: Normal " range of motion. Neck supple. No tracheal deviation present. No thyromegaly present.   Cardiovascular: Normal rate, regular rhythm, normal heart sounds and intact distal pulses.   Pulmonary/Chest: Effort normal and breath sounds normal.   Abdominal: Soft. Bowel sounds are normal. She exhibits no distension and no mass. There is no tenderness.   Genitourinary:   Genitourinary Comments: Deferred to GYN   Musculoskeletal: Normal range of motion. She exhibits tenderness (to multiple joints, no swelling, full ROM). She exhibits no edema.   Lymphadenopathy:     She has no cervical adenopathy.   Neurological: She is alert and oriented to person, place, and time. No sensory deficit. She exhibits normal muscle tone. Coordination normal.   Skin: Skin is warm and dry. Capillary refill takes less than 2 seconds. No rash noted. She is not diaphoretic.   Psychiatric: She has a normal mood and affect. Her behavior is normal. Judgment and thought content normal.   Vitals reviewed.        Diagnosis       1. Annual physical exam    2. Vitamin D deficiency disease    3. Multiple joint pain    4. Obesity (BMI 30-39.9)          Assessment/ Plan     Annual physical exam  · Labs reviewed from [facility], see above.  · To check lipids fasting.  -     Lipid panel; Future; Expected date: 01/07/2019    Vitamin D deficiency disease  · Vitamin-D level has improved from 18 to 20, continue supplement.    Multiple joint pain  -     Ambulatory referral to Rheumatology    Obesity (BMI 30-39.9)  · Healthy diet, exercise, weight reduction.          Lab pending.  Follow-up in clinic as needed.      Patient Care Team:  Primary Doctor No as PCP - General  Raul Burger LPN as Care Coordinator (Internal Medicine)  Mike Kramer MD as Consulting Physician (Physical Medicine and Rehabilitation)  Konrad Ulloa MD as Consulting Physician (Orthopedic Surgery)  Eloisa Shah MD (Obstetrics and Gynecology)      Andrea Cothern, CFNP [facility]

## 2019-01-16 ENCOUNTER — TELEPHONE (OUTPATIENT)
Dept: RHEUMATOLOGY | Facility: CLINIC | Age: 29
End: 2019-01-16

## 2019-01-16 NOTE — TELEPHONE ENCOUNTER
Spoke with  and confirmed appointment with Dr. Miles for 1.17.19 at [facility]

## 2019-01-17 ENCOUNTER — LAB VISIT (OUTPATIENT)
Dept: LAB | Facility: HOSPITAL | Age: 29
End: 2019-01-17
Attending: INTERNAL MEDICINE
Payer: COMMERCIAL

## 2019-01-17 ENCOUNTER — OFFICE VISIT (OUTPATIENT)
Dept: RHEUMATOLOGY | Facility: CLINIC | Age: 29
End: 2019-01-17
Attending: FAMILY MEDICINE
Payer: COMMERCIAL

## 2019-01-17 VITALS
DIASTOLIC BLOOD PRESSURE: 54 MMHG | HEIGHT: 59 IN | SYSTOLIC BLOOD PRESSURE: 110 MMHG | BODY MASS INDEX: 22.98 KG/M2 | WEIGHT: 114 LBS

## 2019-01-17 DIAGNOSIS — E55.9 VITAMIN D DEFICIENCY DISEASE: ICD-10-CM

## 2019-01-17 DIAGNOSIS — G56.01 CARPAL TUNNEL SYNDROME OF RIGHT WRIST: ICD-10-CM

## 2019-01-17 DIAGNOSIS — M25.50 POLYARTHRALGIA: Primary | ICD-10-CM

## 2019-01-17 DIAGNOSIS — M25.50 POLYARTHRALGIA: ICD-10-CM

## 2019-01-17 PROBLEM — M70.61 GREATER TROCHANTERIC BURSITIS OF BOTH HIPS: Status: RESOLVED | Noted: 2018-05-11 | Resolved: 2019-01-17

## 2019-01-17 PROBLEM — M70.62 GREATER TROCHANTERIC BURSITIS OF BOTH HIPS: Status: RESOLVED | Noted: 2018-05-11 | Resolved: 2019-01-17

## 2019-01-17 LAB
CCP AB SER IA-ACNC: <0.5 U/ML
CK SERPL-CCNC: 89 U/L
CRP SERPL-MCNC: 3.2 MG/L
ERYTHROCYTE [SEDIMENTATION RATE] IN BLOOD BY WESTERGREN METHOD: 11 MM/HR

## 2019-01-17 PROCEDURE — 81374 HLA I TYPING 1 ANTIGEN LR: CPT

## 2019-01-17 PROCEDURE — 99205 OFFICE O/P NEW HI 60 MIN: CPT | Mod: S$GLB,,, | Performed by: INTERNAL MEDICINE

## 2019-01-17 PROCEDURE — 82085 ASSAY OF ALDOLASE: CPT

## 2019-01-17 PROCEDURE — 36415 COLL VENOUS BLD VENIPUNCTURE: CPT

## 2019-01-17 PROCEDURE — 99999 PR PBB SHADOW E&M-EST. PATIENT-LVL III: ICD-10-PCS | Mod: PBBFAC,,, | Performed by: INTERNAL MEDICINE

## 2019-01-17 PROCEDURE — 86140 C-REACTIVE PROTEIN: CPT

## 2019-01-17 PROCEDURE — 3008F BODY MASS INDEX DOCD: CPT | Mod: CPTII,S$GLB,, | Performed by: INTERNAL MEDICINE

## 2019-01-17 PROCEDURE — 86200 CCP ANTIBODY: CPT

## 2019-01-17 PROCEDURE — 99205 PR OFFICE/OUTPT VISIT, NEW, LEVL V, 60-74 MIN: ICD-10-PCS | Mod: S$GLB,,, | Performed by: INTERNAL MEDICINE

## 2019-01-17 PROCEDURE — 99999 PR PBB SHADOW E&M-EST. PATIENT-LVL III: CPT | Mod: PBBFAC,,, | Performed by: INTERNAL MEDICINE

## 2019-01-17 PROCEDURE — 85651 RBC SED RATE NONAUTOMATED: CPT

## 2019-01-17 PROCEDURE — 3008F PR BODY MASS INDEX (BMI) DOCUMENTED: ICD-10-PCS | Mod: CPTII,S$GLB,, | Performed by: INTERNAL MEDICINE

## 2019-01-17 PROCEDURE — 82550 ASSAY OF CK (CPK): CPT

## 2019-01-17 RX ORDER — AMITRIPTYLINE HYDROCHLORIDE 10 MG/1
10 TABLET, FILM COATED ORAL NIGHTLY
Qty: 30 TABLET | Refills: 1 | Status: SHIPPED | OUTPATIENT
Start: 2019-01-17 | End: 2020-02-18

## 2019-01-17 RX ORDER — METHYLPREDNISOLONE 4 MG/1
TABLET ORAL
Qty: 1 PACKAGE | Refills: 0 | Status: SHIPPED | OUTPATIENT
Start: 2019-01-17 | End: 2020-02-18

## 2019-01-17 NOTE — LETTER
January 17, 2019      Kieran Cardenas, NP  [address] [place] [address]           [facility]  [id number] [place] [address] [address] [place] [address]  Phone: [phone]  Fax: [phone]          Patient: Karlee Guevara   MR Number: [id number]   YOB: 1990   Date of Visit: 1/17/2019       Dear Kieran Cardenas:    Thank you for referring Karlee Guevara to me for evaluation. Attached you will find relevant portions of my assessment and plan of care.    If you have questions, please do not hesitate to call me. I look forward to following Karlee Guevara along with you.    Sincerely,    Logan Miles MD    Enclosure  CC:  No Recipients    If you would like to receive this communication electronically, please contact [email or web address] or [phone] to request more information on [facility] [employer] access.    For providers and/or their staff who would like to refer a patient to [facility], please contact us through our one-stop-shop provider referral line, [facility], at [phone].    If you feel you have received this communication in error or would no longer like to receive these types of communications, please e-mail [email or web address]

## 2019-01-17 NOTE — ASSESSMENT & PLAN NOTE
Diffuse nonspecific polyarthralgia not associated with synovitis/dactylitis/enthesitis/effusion associated with myofascial pain likely secondary to serotonin insufficiency.  Rheumatoid factor and SANJAY negative.  Mild abnormality of sedimentation rate in the past.  Repeat sedimentation rate today along with other serologies.  Try low-dose amitriptyline at bedtime.

## 2019-01-17 NOTE — PROGRESS NOTES
RHEUMATOLOGY CLINIC INITIAL VISIT    Reason for referral:-  Referred by MsOrionTheresa  for evaluation of joint pain.    Chief complaints:-  Mostly the right side of my joints hurt.  The right wrist hurts the most.    HPI:-  Karlee Heredia a 28 y.o. pleasant female comes in for an initial visit with above chief complaints.  She has significant past medical history as reviewed below including mild lumbar spondylosis and trochanteric bursitis.  She was complaining of worsening diffuse joint pains involving her right side and she was referred for rheumatology evaluation.  Today her most complains was restricted to her right wrist joint from her carpal tunnel syndrome which has been worsening rapidly in the past couple of months.  She is scheduled to see hand surgeon in a couple of weeks.  She also complains of stiffness around the body occurring any time of the day and getting worse around the night.  She denies having any significant joint pain before her delivery.  In the 3 years after her delivery of her 1st child she has progressively seen to have more pain and difficulty losing the weight.  She denies photosensitive malar rash, sicca syndrome, Raynaud's phenomenon, intermittent fevers, chills, recurrent infections, immunodeficiency state.    Review of Systems   Constitutional: Negative for chills and fever.   HENT: Negative for congestion and sore throat.    Eyes: Negative for blurred vision and redness.   Respiratory: Negative for cough and shortness of breath.    Cardiovascular: Negative for chest pain and leg swelling.   Gastrointestinal: Negative for abdominal pain.   Genitourinary: Negative for dysuria.   Musculoskeletal: Positive for back pain, joint pain and neck pain. Negative for falls and myalgias.   Skin: Negative for rash.   Neurological: Negative for headaches.   Endo/Heme/Allergies: Does not bruise/bleed easily.   Psychiatric/Behavioral:  "Negative for memory loss. The patient does not have insomnia.        Past Medical History:   Diagnosis Date    GERD (gastroesophageal reflux disease)     Greater trochanteric bursitis of both hips 2018       Past Surgical History:   Procedure Laterality Date     SECTION          Social History     Tobacco Use    Smoking status: Never Smoker    Smokeless tobacco: Never Used   Substance Use Topics    Alcohol use: No    Drug use: No       No family history on file.    Review of patient's allergies indicates:  No Known Allergies        Physical examination:-    Vitals:    19 0928   BP: (!) 110/54   Weight: 51.7 kg (113 lb 15.7 oz)   Height: 4' 11" (1.499 m)   PainSc: 0-No pain       Physical Exam   Constitutional: She is oriented to person, place, and time and well-developed, well-nourished, and in no distress. No distress.   HENT:   Head: Normocephalic.   Mouth/Throat: Oropharynx is clear and moist.   Eyes: Conjunctivae and EOM are normal. Pupils are equal, round, and reactive to light.   Neck: Normal range of motion. Neck supple.   Cardiovascular: Normal rate and intact distal pulses.   Pulmonary/Chest: Effort normal. No respiratory distress.   Abdominal: Soft. There is no tenderness.   Musculoskeletal:   Small joint examination does not show any evidence of synovitis/enthesitis/dactylitis/effusion.  Positive Tinel sign of right wrist.  Few tender points but not severe enough to be concerned.  Mild trochanteric bursitis of right hip.   Neurological: She is alert and oriented to person, place, and time. No cranial nerve deficit.   Skin: Skin is warm. No rash noted. No erythema.   Psychiatric: Mood and affect normal.   Nursing note and vitals reviewed.      Labs:-  Negative SANJAY, negative rheumatoid factor.      Radiographs:-  Independent visualization of images done.  Mild lumbar spondylosis.      Old and Outside medical records :-  Reviewed old and all outside medical records available in Care " Everywhere.  Carpal tunnel syndrome right wrist.       Medication List           Accurate as of 1/17/19  4:39 PM. If you have any questions, ask your nurse or doctor.               START taking these medications    amitriptyline 10 MG tablet  Commonly known as:  ELAVIL  Take 1 tablet (10 mg total) by mouth every evening.  Started by:  Logan Miles MD     methylPREDNISolone 4 mg tablet  Commonly known as:  MEDROL DOSEPACK  use as directed  Started by:  Logan Miles MD        CONTINUE taking these medications    diclofenac sodium 1 % Gel  Commonly known as:  VOLTAREN  Apply 4 g topically 3 (three) times daily as needed.        STOP taking these medications    cholecalciferol (vitamin D3) 10,000 unit Cap  Commonly known as:  VITAMIN D3  Stopped by:  Logan Miles MD           Where to Get Your Medications      These medications were sent to [facility] [address] [place] [id number] [address] [address]    Phone:  [phone]   · amitriptyline 10 MG tablet  · methylPREDNISolone 4 mg tablet         Assessment/Plans:-  # Polyarthralgia  Diffuse nonspecific polyarthralgia not associated with synovitis/dactylitis/enthesitis/effusion associated with myofascial pain likely secondary to serotonin insufficiency.  Rheumatoid factor and SANJAY negative.  Mild abnormality of sedimentation rate in the past.  Repeat sedimentation rate today along with other serologies.  Try low-dose amitriptyline at bedtime.  - CK; Future  - Aldolase; Future  - C-reactive protein; Future  - Sedimentation rate; Future  - Cyclic citrul peptide antibody, IgG; Future  - HLA B27 Antigen; Future  - amitriptyline (ELAVIL) 10 MG tablet; Take 1 tablet (10 mg total) by mouth every evening.  Dispense: 30 tablet; Refill: 1    # Carpal tunnel syndrome of right wrist  Worsening carpal tunnel syndrome of right wrist with mild atrophy of thenar muscles on the right side.  She will need  surgical treatment as soon as possible.  Given 1 dose of Medrol Dosepak for temporary inflammation and pain relief.  - methylPREDNISolone (MEDROL DOSEPACK) 4 mg tablet; use as directed  Dispense: 1 Package; Refill: 0    # Vitamin D deficiency disease  Advised to increase the dose of vitamin-D to 60377 units once weekly.      # RTC in 6 weeks.     Thank you asa  for allowing me to participate in the care ofKarlee Salinasjacintadinotessa Robertsonasif.    Time spent: 60 minutes in face to face discussion concerning diagnosis, prognosis, review of lab and test results, benefits of treatment as well as management of disease, counseling of patient and coordination of care between various health care providers . Greater than half of the time spent - 35 minutes was used for coordination of care and counseling of patient.    Disclaimer: This note was prepared using voice recognition system and is likely to have sound alike errors and is not proof read.  Please call me with any questions.

## 2019-01-18 LAB — ALDOLASE SERPL-CCNC: 4 U/L

## 2019-01-24 LAB
HLA-B27 RELATED AG QL: NEGATIVE
HLA-B27 RELATED AG QL: NORMAL

## 2019-02-14 ENCOUNTER — PATIENT MESSAGE (OUTPATIENT)
Dept: RHEUMATOLOGY | Facility: CLINIC | Age: 29
End: 2019-02-14

## 2019-02-25 ENCOUNTER — PATIENT MESSAGE (OUTPATIENT)
Dept: RHEUMATOLOGY | Facility: CLINIC | Age: 29
End: 2019-02-25

## 2019-10-01 ENCOUNTER — TELEPHONE (OUTPATIENT)
Dept: OBSTETRICS AND GYNECOLOGY | Facility: CLINIC | Age: 29
End: 2019-10-01

## 2019-10-01 NOTE — TELEPHONE ENCOUNTER
Called pt to notify overdue well woman exam.  answered wanting to know what's wrong. Informed she was overdue for an appt.  confirmed he'd relay the message.

## 2019-10-01 NOTE — TELEPHONE ENCOUNTER
----- Message from Nimisha Blankenship MA sent at 10/1/2019  2:26 PM CDT -----  Regarding: cervical screening  Call patient with reminder to schedule pap smear

## 2020-02-18 ENCOUNTER — LAB VISIT (OUTPATIENT)
Dept: LAB | Facility: HOSPITAL | Age: 30
End: 2020-02-18
Payer: COMMERCIAL

## 2020-02-18 ENCOUNTER — OFFICE VISIT (OUTPATIENT)
Dept: FAMILY MEDICINE | Facility: CLINIC | Age: 30
End: 2020-02-18
Payer: COMMERCIAL

## 2020-02-18 VITALS
DIASTOLIC BLOOD PRESSURE: 84 MMHG | WEIGHT: 154.31 LBS | HEIGHT: 59 IN | SYSTOLIC BLOOD PRESSURE: 102 MMHG | RESPIRATION RATE: 16 BRPM | HEART RATE: 110 BPM | TEMPERATURE: 98 F | OXYGEN SATURATION: 96 % | BODY MASS INDEX: 31.11 KG/M2

## 2020-02-18 DIAGNOSIS — E55.9 VITAMIN D DEFICIENCY DISEASE: ICD-10-CM

## 2020-02-18 DIAGNOSIS — R51.9 HEADACHE, UNSPECIFIED HEADACHE TYPE: ICD-10-CM

## 2020-02-18 DIAGNOSIS — R53.83 FATIGUE, UNSPECIFIED TYPE: Primary | ICD-10-CM

## 2020-02-18 DIAGNOSIS — R53.83 FATIGUE, UNSPECIFIED TYPE: ICD-10-CM

## 2020-02-18 LAB
ALBUMIN SERPL BCP-MCNC: 4.1 G/DL (ref 3.5–5.2)
ALP SERPL-CCNC: 48 U/L (ref 55–135)
ALT SERPL W/O P-5'-P-CCNC: 73 U/L (ref 10–44)
ANION GAP SERPL CALC-SCNC: 11 MMOL/L (ref 8–16)
AST SERPL-CCNC: 42 U/L (ref 10–40)
BASOPHILS # BLD AUTO: 0.04 K/UL (ref 0–0.2)
BASOPHILS NFR BLD: 0.5 % (ref 0–1.9)
BILIRUB SERPL-MCNC: 0.2 MG/DL (ref 0.1–1)
BUN SERPL-MCNC: 13 MG/DL (ref 6–20)
CALCIUM SERPL-MCNC: 9.3 MG/DL (ref 8.7–10.5)
CHLORIDE SERPL-SCNC: 105 MMOL/L (ref 95–110)
CO2 SERPL-SCNC: 24 MMOL/L (ref 23–29)
CREAT SERPL-MCNC: 0.7 MG/DL (ref 0.5–1.4)
DIFFERENTIAL METHOD: ABNORMAL
EOSINOPHIL # BLD AUTO: 0.1 K/UL (ref 0–0.5)
EOSINOPHIL NFR BLD: 1.6 % (ref 0–8)
ERYTHROCYTE [DISTWIDTH] IN BLOOD BY AUTOMATED COUNT: 13.1 % (ref 11.5–14.5)
EST. GFR  (AFRICAN AMERICAN): >60 ML/MIN/1.73 M^2
EST. GFR  (NON AFRICAN AMERICAN): >60 ML/MIN/1.73 M^2
GLUCOSE SERPL-MCNC: 93 MG/DL (ref 70–110)
HCT VFR BLD AUTO: 44.6 % (ref 37–48.5)
HGB BLD-MCNC: 14.1 G/DL (ref 12–16)
IMM GRANULOCYTES # BLD AUTO: 0.02 K/UL (ref 0–0.04)
IMM GRANULOCYTES NFR BLD AUTO: 0.2 % (ref 0–0.5)
LYMPHOCYTES # BLD AUTO: 1.9 K/UL (ref 1–4.8)
LYMPHOCYTES NFR BLD: 23.9 % (ref 18–48)
MCH RBC QN AUTO: 27.3 PG (ref 27–31)
MCHC RBC AUTO-ENTMCNC: 31.6 G/DL (ref 32–36)
MCV RBC AUTO: 86 FL (ref 82–98)
MONOCYTES # BLD AUTO: 0.5 K/UL (ref 0.3–1)
MONOCYTES NFR BLD: 5.7 % (ref 4–15)
NEUTROPHILS # BLD AUTO: 5.5 K/UL (ref 1.8–7.7)
NEUTROPHILS NFR BLD: 68.1 % (ref 38–73)
NRBC BLD-RTO: 0 /100 WBC
PLATELET # BLD AUTO: 238 K/UL (ref 150–350)
PMV BLD AUTO: 10.4 FL (ref 9.2–12.9)
POTASSIUM SERPL-SCNC: 4.1 MMOL/L (ref 3.5–5.1)
PROT SERPL-MCNC: 8 G/DL (ref 6–8.4)
RBC # BLD AUTO: 5.16 M/UL (ref 4–5.4)
SODIUM SERPL-SCNC: 140 MMOL/L (ref 136–145)
TSH SERPL DL<=0.005 MIU/L-ACNC: 1.35 UIU/ML (ref 0.4–4)
WBC # BLD AUTO: 8.05 K/UL (ref 3.9–12.7)

## 2020-02-18 PROCEDURE — 36415 COLL VENOUS BLD VENIPUNCTURE: CPT | Mod: PO

## 2020-02-18 PROCEDURE — 82746 ASSAY OF FOLIC ACID SERUM: CPT

## 2020-02-18 PROCEDURE — 80053 COMPREHEN METABOLIC PANEL: CPT

## 2020-02-18 PROCEDURE — 82607 VITAMIN B-12: CPT

## 2020-02-18 PROCEDURE — 82306 VITAMIN D 25 HYDROXY: CPT

## 2020-02-18 PROCEDURE — 3008F PR BODY MASS INDEX (BMI) DOCUMENTED: ICD-10-PCS | Mod: CPTII,S$GLB,, | Performed by: REGISTERED NURSE

## 2020-02-18 PROCEDURE — 85025 COMPLETE CBC W/AUTO DIFF WBC: CPT

## 2020-02-18 PROCEDURE — 99214 OFFICE O/P EST MOD 30 MIN: CPT | Mod: S$GLB,,, | Performed by: REGISTERED NURSE

## 2020-02-18 PROCEDURE — 84443 ASSAY THYROID STIM HORMONE: CPT

## 2020-02-18 PROCEDURE — 99214 PR OFFICE/OUTPT VISIT, EST, LEVL IV, 30-39 MIN: ICD-10-PCS | Mod: S$GLB,,, | Performed by: REGISTERED NURSE

## 2020-02-18 PROCEDURE — 99999 PR PBB SHADOW E&M-EST. PATIENT-LVL III: ICD-10-PCS | Mod: PBBFAC,,, | Performed by: REGISTERED NURSE

## 2020-02-18 PROCEDURE — 99999 PR PBB SHADOW E&M-EST. PATIENT-LVL III: CPT | Mod: PBBFAC,,, | Performed by: REGISTERED NURSE

## 2020-02-18 PROCEDURE — 3008F BODY MASS INDEX DOCD: CPT | Mod: CPTII,S$GLB,, | Performed by: REGISTERED NURSE

## 2020-02-18 RX ORDER — BISMUTH SUBCITRATE POTASSIUM, METRONIDAZOLE, TETRACYCLINE HYDROCHLORIDE 140; 125; 125 MG/1; MG/1; MG/1
CAPSULE ORAL
COMMUNITY
Start: 2020-02-06 | End: 2020-10-22

## 2020-02-18 RX ORDER — OMEPRAZOLE 40 MG/1
CAPSULE, DELAYED RELEASE ORAL
COMMUNITY
Start: 2020-02-05 | End: 2021-10-17

## 2020-02-18 NOTE — PROGRESS NOTES
Subjective:       Patient ID: Karlee Guevara is a 29 y.o. female.    Chief Complaint   Patient presents with    Migraine    Nausea       HPI    Karlee Guevara is here today with c/o migraine headaches with nausea over the past week.  She does report feeling much better today, no HA pain.  Currently on Pylera for an H-Pylori infection as ordered per GI doc; she reports feeling ill before starting medication, the med has not made anything worse.  Reports low energy, fatigue, dizziness along w/ the nausea and HA pain.  No med taken for current complaints.        Review of Systems   Constitutional: Positive for appetite change and fatigue. Negative for chills and fever.   HENT: Negative.    Eyes: Negative.    Respiratory: Negative.    Cardiovascular: Negative.    Gastrointestinal: Positive for nausea. Negative for abdominal distention, abdominal pain, anal bleeding, blood in stool, constipation, diarrhea and vomiting.   Musculoskeletal: Negative for myalgias.   Skin: Negative.    Neurological: Positive for light-headedness and headaches. Negative for tremors, syncope, weakness and numbness.         Review of patient's allergies indicates:  No Known Allergies      Patient Active Problem List   Diagnosis    Vitamin D deficiency disease    Dyslipidemia    Carpal tunnel syndrome of right wrist    Polyarthralgia       Medication List with Changes/Refills   Current Medications    DICLOFENAC SODIUM 1 % GEL    Apply 4 g topically 3 (three) times daily as needed.    OMEPRAZOLE (PRILOSEC) 40 MG CAPSULE        PYLERA 140-125-125 MG PER CAPSULE       Discontinued Medications    AMITRIPTYLINE (ELAVIL) 10 MG TABLET    Take 1 tablet (10 mg total) by mouth every evening.    METHYLPREDNISOLONE (MEDROL DOSEPACK) 4 MG TABLET    use as directed             Past medical, surgical, family and social histories have been reviewed today.        Objective:     Vitals:    02/18/20 1008   BP: 102/84   Pulse: 110   Resp:  "16   Temp: 98 °F (36.7 °C)   TempSrc: Oral   SpO2: 96%   Weight: 70 kg (154 lb 5.2 oz)   Height: 4' 11" (1.499 m)   PainSc: 0-No pain       Estimated body mass index is 23.02 kg/m² as calculated from the following:    Height as of 1/17/19: 4' 11" (1.499 m).    Weight as of 1/17/19: 51.7 kg (113 lb 15.7 oz).      Physical Exam   Constitutional: She is oriented to person, place, and time. She appears well-developed and well-nourished. No distress.   HENT:   Head: Normocephalic and atraumatic.   Eyes: Pupils are equal, round, and reactive to light. Conjunctivae and EOM are normal. Right eye exhibits no discharge. Left eye exhibits no discharge. No scleral icterus.   Neck: Normal range of motion. Neck supple. No JVD present. No tracheal deviation present. No thyromegaly present.   Cardiovascular: Regular rhythm. Tachycardia present.   Pulmonary/Chest: Effort normal and breath sounds normal.   Abdominal: Soft. Bowel sounds are normal. She exhibits no distension. There is no tenderness.   Musculoskeletal: Normal range of motion. She exhibits no edema, tenderness or deformity.   Lymphadenopathy:     She has no cervical adenopathy.   Neurological: She is alert and oriented to person, place, and time.   Skin: Skin is warm and dry. Capillary refill takes less than 2 seconds. No rash noted. She is not diaphoretic.   Psychiatric: She has a normal mood and affect. Her behavior is normal. Judgment and thought content normal.   Vitals reviewed.        Diagnosis       1. Fatigue, unspecified type    2. Vitamin D deficiency disease    3. Headache, unspecified headache type          Assessment/ Plan     Fatigue, unspecified type  -     CBC auto differential; Future; Expected date: 02/18/2020  -     Comprehensive metabolic panel; Future; Expected date: 02/18/2020  -     TSH; Future; Expected date: 02/18/2020  -     Vitamin D; Future; Expected date: 02/18/2020  -     Folate; Future; Expected date: 02/18/2020  -     Vitamin B12; Future; " Expected date: 02/18/2020  -     Urinalysis, Reflex to Urine Culture Urine, Clean Catch    Vitamin D deficiency disease  -     Vitamin D; Future; Expected date: 02/18/2020    Headache, unspecified headache type  -     CBC auto differential; Future; Expected date: 02/18/2020  -     Comprehensive metabolic panel; Future; Expected date: 02/18/2020  -     TSH; Future; Expected date: 02/18/2020        Check lab.  Discussed healthy diet, hydration, lifestyle mod.  Follow-up in clinic as needed.        Patient Care Team:  Kieran Cardenas NP as PCP - General (General Practice)  Raul Burger LPN as Care Coordinator (Internal Medicine)  Mike Kramer MD as Consulting Physician (Physical Medicine and Rehabilitation)  Konrad Ulloa MD as Consulting Physician (Orthopedic Surgery)  Eloisa Shah MD (Obstetrics and Gynecology)      BARBARA Martin  [facility]

## 2020-02-19 ENCOUNTER — PATIENT MESSAGE (OUTPATIENT)
Dept: FAMILY MEDICINE | Facility: CLINIC | Age: 30
End: 2020-02-19

## 2020-02-19 LAB
25(OH)D3+25(OH)D2 SERPL-MCNC: 45 NG/ML (ref 30–96)
FOLATE SERPL-MCNC: 14.5 NG/ML (ref 4–24)
VIT B12 SERPL-MCNC: 274 PG/ML (ref 210–950)

## 2020-02-20 ENCOUNTER — TELEPHONE (OUTPATIENT)
Dept: FAMILY MEDICINE | Facility: CLINIC | Age: 30
End: 2020-02-20

## 2020-02-20 NOTE — TELEPHONE ENCOUNTER
Can help by limiting intake of Tylenol or not taking excessive amounts.  Limit intake of fried foods.  Want to recheck her labs in a month or two -- lab only appt.

## 2020-02-20 NOTE — TELEPHONE ENCOUNTER
----- Message from Faith Pedersen sent at 2/20/2020 11:51 AM CST -----  Contact: Annabelle ()- [phone]  .Type:  Patient Returning Call    Who Called:Annabelle ()  Who Left Message for Patient:Unsure   Does the patient know what this is regarding?:results   Would the patient rather a call back or a response via [facility]? Call back   Best Call Back Number:.[phone] (home)   Additional Information:     Thank You,   Faith Pedersen

## 2020-02-20 NOTE — TELEPHONE ENCOUNTER
Called and spoke with patient  in reference to her labs. Patient  said that overall she Is doing ok.     Patient  would like to know do Karlee need to do anything to get her liver enzymes down. He advise she is not taking any type of tylenol and eating fatty foods.

## 2020-03-04 ENCOUNTER — OFFICE VISIT (OUTPATIENT)
Dept: INTERNAL MEDICINE | Facility: CLINIC | Age: 30
End: 2020-03-04
Payer: COMMERCIAL

## 2020-03-04 VITALS
BODY MASS INDEX: 31.28 KG/M2 | TEMPERATURE: 99 F | SYSTOLIC BLOOD PRESSURE: 104 MMHG | DIASTOLIC BLOOD PRESSURE: 70 MMHG | HEIGHT: 59 IN | WEIGHT: 155.19 LBS | OXYGEN SATURATION: 98 % | HEART RATE: 91 BPM

## 2020-03-04 DIAGNOSIS — R52 GENERALIZED BODY ACHES: ICD-10-CM

## 2020-03-04 DIAGNOSIS — J02.9 PHARYNGITIS, UNSPECIFIED ETIOLOGY: Primary | ICD-10-CM

## 2020-03-04 LAB
GROUP A STREP, MOLECULAR: NEGATIVE
INFLUENZA A, MOLECULAR: NEGATIVE
INFLUENZA B, MOLECULAR: NEGATIVE
SPECIMEN SOURCE: NORMAL

## 2020-03-04 PROCEDURE — 99213 PR OFFICE/OUTPT VISIT, EST, LEVL III, 20-29 MIN: ICD-10-PCS | Mod: S$GLB,,, | Performed by: NURSE PRACTITIONER

## 2020-03-04 PROCEDURE — 99213 OFFICE O/P EST LOW 20 MIN: CPT | Mod: S$GLB,,, | Performed by: NURSE PRACTITIONER

## 2020-03-04 PROCEDURE — 87651 STREP A DNA AMP PROBE: CPT

## 2020-03-04 PROCEDURE — 87502 INFLUENZA DNA AMP PROBE: CPT

## 2020-03-04 PROCEDURE — 3008F BODY MASS INDEX DOCD: CPT | Mod: CPTII,S$GLB,, | Performed by: NURSE PRACTITIONER

## 2020-03-04 PROCEDURE — 99999 PR PBB SHADOW E&M-EST. PATIENT-LVL III: ICD-10-PCS | Mod: PBBFAC,,, | Performed by: NURSE PRACTITIONER

## 2020-03-04 PROCEDURE — 3008F PR BODY MASS INDEX (BMI) DOCUMENTED: ICD-10-PCS | Mod: CPTII,S$GLB,, | Performed by: NURSE PRACTITIONER

## 2020-03-04 PROCEDURE — 99999 PR PBB SHADOW E&M-EST. PATIENT-LVL III: CPT | Mod: PBBFAC,,, | Performed by: NURSE PRACTITIONER

## 2020-03-04 NOTE — PROGRESS NOTES
Subjective:       Patient ID: Karlee Guevara is a 29 y.o. female.    Chief Complaint: Sore Throat and Cough    HPI    Reports above x 1 day with body aches  Taking advil 200 mg every 4 hours with minimal relief  Throat hurts more on right side.   Pt speaks limited english  here to translate     Past Medical History:   Diagnosis Date    GERD (gastroesophageal reflux disease)     Greater trochanteric bursitis of both hips 2018       Past Surgical History:   Procedure Laterality Date     SECTION       Social History     Socioeconomic History    Marital status:      Spouse name: Not on file    Number of children: 1    Years of education: Not on file    Highest education level: Not on file   Occupational History    Occupation: Unemployed   Social Needs    Financial resource strain: Not on file    Food insecurity:     Worry: Not on file     Inability: Not on file    Transportation needs:     Medical: Not on file     Non-medical: Not on file   Tobacco Use    Smoking status: Never Smoker    Smokeless tobacco: Never Used   Substance and Sexual Activity    Alcohol use: No    Drug use: No    Sexual activity: Yes     Partners: Male   Lifestyle    Physical activity:     Days per week: Not on file     Minutes per session: Not on file    Stress: Not on file   Relationships    Social connections:     Talks on phone: Not on file     Gets together: Not on file     Attends Confucianist service: Not on file     Active member of club or organization: Not on file     Attends meetings of clubs or organizations: Not on file     Relationship status: Not on file   Other Topics Concern    Not on file   Social History Narrative    Not on file     Review of patient's allergies indicates:  No Known Allergies  Current Outpatient Medications   Medication Sig    diclofenac sodium 1 % Gel Apply 4 g topically 3 (three) times daily as needed.    omeprazole (PRILOSEC) 40 MG capsule     PYLERA  140-125-125 mg per capsule      No current facility-administered medications for this visit.            Review of Systems   Constitutional: Negative for activity change, appetite change, chills, diaphoresis, fatigue, fever and unexpected weight change.   HENT: Positive for sore throat. Negative for congestion, ear pain, postnasal drip, rhinorrhea, sinus pressure, sinus pain, sneezing, tinnitus, trouble swallowing and voice change.    Eyes: Negative for photophobia, pain and visual disturbance.   Respiratory: Positive for cough. Negative for chest tightness, shortness of breath and wheezing.    Cardiovascular: Negative for chest pain, palpitations and leg swelling.   Gastrointestinal: Negative for abdominal distention, abdominal pain, constipation, diarrhea, nausea and vomiting.   Genitourinary: Negative for decreased urine volume, difficulty urinating, dysuria, flank pain, frequency, hematuria and urgency.   Musculoskeletal: Negative for arthralgias, back pain, joint swelling, neck pain and neck stiffness.   Allergic/Immunologic: Negative for immunocompromised state.   Neurological: Negative for dizziness, tremors, seizures, syncope, facial asymmetry, speech difficulty, weakness, light-headedness, numbness and headaches.   Hematological: Negative for adenopathy. Does not bruise/bleed easily.   Psychiatric/Behavioral: Negative for confusion and sleep disturbance.       Objective:      Physical Exam   Constitutional: She is oriented to person, place, and time.   Neck: Normal range of motion. Neck supple.   Cardiovascular: Normal rate, regular rhythm and normal heart sounds.   Pulmonary/Chest: Effort normal and breath sounds normal.   Neurological: She is alert and oriented to person, place, and time.   Skin: Skin is warm and dry.       Assessment:     Vitals:    03/04/20 1642   BP: 104/70   Pulse: 91   Temp: 98.9 °F (37.2 °C)         1. Pharyngitis, unspecified etiology    2. Generalized body aches        Plan:    Pharyngitis, unspecified etiology  -     Influenza A & B by Molecular  -     Group A Strep, Molecular    Generalized body aches  -     Influenza A & B by Molecular      As above   Supportive care discussed

## 2020-03-05 ENCOUNTER — INITIAL CONSULT (OUTPATIENT)
Dept: PSYCHIATRY | Facility: CLINIC | Age: 30
End: 2020-03-05
Payer: COMMERCIAL

## 2020-03-05 DIAGNOSIS — F41.1 GAD (GENERALIZED ANXIETY DISORDER): Primary | ICD-10-CM

## 2020-03-05 PROCEDURE — 90792 PR PSYCHIATRIC DIAGNOSTIC EVALUATION W/MEDICAL SERVICES: ICD-10-PCS | Mod: S$GLB,,, | Performed by: PSYCHIATRY & NEUROLOGY

## 2020-03-05 PROCEDURE — 90792 PSYCH DIAG EVAL W/MED SRVCS: CPT | Mod: S$GLB,,, | Performed by: PSYCHIATRY & NEUROLOGY

## 2020-03-05 PROCEDURE — 99999 PR PBB SHADOW E&M-EST. PATIENT-LVL I: CPT | Mod: PBBFAC,,, | Performed by: PSYCHIATRY & NEUROLOGY

## 2020-03-05 PROCEDURE — 99999 PR PBB SHADOW E&M-EST. PATIENT-LVL I: ICD-10-PCS | Mod: PBBFAC,,, | Performed by: PSYCHIATRY & NEUROLOGY

## 2020-03-05 RX ORDER — LORAZEPAM 1 MG/1
1 TABLET ORAL DAILY PRN
Qty: 30 TABLET | Refills: 2 | Status: SHIPPED | OUTPATIENT
Start: 2020-03-05 | End: 2021-06-28

## 2020-03-05 RX ORDER — ESCITALOPRAM OXALATE 10 MG/1
TABLET ORAL
Qty: 30 TABLET | Refills: 2 | Status: SHIPPED | OUTPATIENT
Start: 2020-03-05 | End: 2020-04-30 | Stop reason: SDUPTHER

## 2020-03-05 NOTE — PROGRESS NOTES
Outpatient Psychiatry Initial Visit (MD/NP)    3/5/2020    Karlee Guevara, a 29 y.o. female, presenting for initial evaluation visit. Met with patient.    Reason for Encounter: Patient complains of anxiety.     History of Present Illness: Patient is a 30 y/o F who presents with her  for establishment of care for chronic anxiety. She was seen by Ancelmo Marrero for therapy, and he documents the followin y/o Bella F presented accompanied by her , Herman. Pt with minimal English ability; some modest English comprehension but little confidence speaking it. Pt indicated a desire to have her  translate, though she did agree to us contacting language line  services & hearing their services explained. She also agreed to use their services for portions of sessions going forward to talk without her  present for part of the sessions. Pt's chief presenting complaint was a combination of depression & anxiety. She endorsed depressive symptoms over the past 1-1.5 y, but described as somewhat worse a year ago. Pt became a firsttime new mother in late . Reporting increasing feelings of loneliness & helplessness since moving to this country. Pt reported she is college educated, with a double major in business & public administration. She reported several months history of fear of driving; she will ride as a passenger in cars but is afraid to drive, though she has previous driving experience. Also reported frequent tearfulness, feeling over-sensitive, easily triggered to tears. Also fearful of being alone. Reported pervasive sadness, hopelessness, & catastrophizing ruminating thoughts. Feeling intensely dependent on her  for everything, & experiencing obsessive thoughts of something unspecified but horrible happening to him. Stated she has a long-time enjoyment of watching horror movies but now finds many of her anxious thoughts center around images from  "different horror movies. She endorsed a family of origin history of her father having pervasive severe anxiety. Pt's  is a , & his work brought them to [place]. 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 Pt described health as manageable now; experienced gestational diabetes in 2016.  Feels still fatigued; suffers from degenerative disc disease & bilateral bursitis in her hips. Denied any suicidal or homicidal ideation. Denied any psychosis, cognitive deficit, or substance abuse. Identified therapeutic goals include reduction in anxiety & depression, improvement of coping skills & improving sense of hope & goals for her future.      They confirm this above history today, report ongoing problems with "unreasonable fears" (his words) - e.g. Fears MVA when a passenger or  in a car, won't drive on highways, tries to avoid driving when possible, otherwise suffers through. Both she & he think it's excessive. Afraid of getting on the subway or crossing the street. Reports these fears are chronic, have been present since a teen. Excessively anxious on high school assessment examinations & when classmates left. Also endorses fears about harm coming to her 1 y/o, and  has noticed that she keeps him close for her own comfort.   Fearful to be alone. Son is somewhat more shy &  is concerned that her overprotectiveness has affected him (though better over time).  feels that she's demands excessive attention-seeking. Excessively anxious about Corona virus early in course of local outbreak.     From [facility]. Here x 4.5 years. +anhedonic. +eats to comfort herself; then regrets it. Sleeps very lightly, wakes fearfully. Sleep is not fully restful. Has had passing, passive SI. Denies active intent or plan. +AH of water running during & just after pregnancy. Never since. No delusions.     Seen by Moustapha Marrero in May 2018, had 3 visits, but didn't feel she was making " any progress with therapy. From his the rest of Mr Marrero' documentation:     FamilyHx: mother was excessively anxious (but less so than patient). No others.   MedicalHx: had gestational DM. S/p .   Carpal tunnel  lumbar spondylosis and trochanteric bursitis; Polyarthralgia - has seen rheumatology, pain management. Myofascial pain.   Psychiatric Hx: 1 psychological evaluation.   Social Hx: born in [facility], [place] Grew up with both parents, 1 sister, 1 brother (older). Says that childhood wasn't happy, mother was overprotective. Was affected by rumors that besmirched her honor (serious allegations). Graduated HS. Denies trauma. Says that she was also hypersensitive to description of trauma from others.  in '15. [place]oved [place] shortly thereafter. Son is 3. Healthy & doing well.  is a .  has a cousin in [place]. She has a distant family member in [place]. No other family here.   Some socializing. Rarely attends [employer]. Attends [employer]. 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     Review Of Systems:     GENERAL:  No weight gain or loss  SKIN:  No rashes or lacerations  HEAD:  No headaches  EYES:  No exophthalmos, jaundice or blindness  EARS:  No dizziness, tinnitus or hearing loss  NOSE:  No changes in smell  MOUTH & THROAT:  No dyskinetic movements or obvious goiter  CHEST:  No shortness of breath, hyperventilation or cough  CARDIOVASCULAR:  No tachycardia or chest pain  ABDOMEN:  No nausea, vomiting, pain, constipation or diarrhea  URINARY:  No frequency, dysuria or sexual dysfunction  ENDOCRINE:  No polydipsia, polyuria  MUSCULOSKELETAL:  No pain or stiffness of the joints  NEUROLOGIC:  No weakness, sensory changes, seizures, confusion, memory loss, tremor or other abnormal movements    Current Evaluation:     Nutritional Screening: Considering the patient's height and weight, medications, medical history and preferences, should a referral be made to the dietitian?  no    Constitutional  Vitals:  Most recent vital signs, dated less than 90 days prior to this appointment, were reviewed.    There were no vitals filed for this visit.     General:  unremarkable, age appropriate     Musculoskeletal  Muscle Strength/Tone:  no tremor, no tic   Gait & Station:  non-ataxic     Psychiatric  Appearance: casually dressed & groomed;   Behavior: calm,   Cooperation: cooperative with assessment  Speech: normal rate, volume, tone  Thought Process: linear, goal-directed  Thought Content: No suicidal or homicidal ideation; no delusions  Affect: normal range  Mood: euthymic  Perceptions: No auditory or visual hallucinations  Level of Consciousness: alert throughout interview  Insight: fair  Cognition: Oriented to person, place, time, & situation  Memory: no apparent deficits to general clinical interview; not formally assessed  Attention/Concentration: no apparent deficits to general clinical interview; not formally assessed  Fund of Knowledge: average by vocabulary/education    Laboratory Data  Office Visit on 03/04/2020   Component Date Value Ref Range Status    Influenza A, Molecular 03/04/2020 Negative  Negative Final    Influenza B, Molecular 03/04/2020 Negative  Negative Final    Flu A & B Source 03/04/2020 NP   Final    Group A Strep, Molecular 03/04/2020 Negative  Negative Final   Lab Visit on 02/18/2020   Component Date Value Ref Range Status    WBC 02/18/2020 8.05  3.90 - 12.70 K/uL Final    RBC 02/18/2020 5.16  4.00 - 5.40 M/uL Final    Hemoglobin 02/18/2020 14.1  12.0 - 16.0 g/dL Final    Hematocrit 02/18/2020 44.6  37.0 - 48.5 % Final    Mean Corpuscular Volume 02/18/2020 86  82 - 98 fL Final    Mean Corpuscular Hemoglobin 02/18/2020 27.3  27.0 - 31.0 pg Final    Mean Corpuscular Hemoglobin Conc 02/18/2020 31.6* 32.0 - 36.0 g/dL Final    RDW 02/18/2020 13.1  11.5 - 14.5 % Final    Platelets 02/18/2020 238  150 - 350 K/uL Final    MPV 02/18/2020 10.4  9.2 - 12.9 fL Final     Immature Granulocytes 02/18/2020 0.2  0.0 - 0.5 % Final    Gran # (ANC) 02/18/2020 5.5  1.8 - 7.7 K/uL Final    Immature Grans (Abs) 02/18/2020 0.02  0.00 - 0.04 K/uL Final    Lymph # 02/18/2020 1.9  1.0 - 4.8 K/uL Final    Mono # 02/18/2020 0.5  0.3 - 1.0 K/uL Final    Eos # 02/18/2020 0.1  0.0 - 0.5 K/uL Final    Baso # 02/18/2020 0.04  0.00 - 0.20 K/uL Final    nRBC 02/18/2020 0  0 /100 WBC Final    Gran% 02/18/2020 68.1  38.0 - 73.0 % Final    Lymph% 02/18/2020 23.9  18.0 - 48.0 % Final    Mono% 02/18/2020 5.7  4.0 - 15.0 % Final    Eosinophil% 02/18/2020 1.6  0.0 - 8.0 % Final    Basophil% 02/18/2020 0.5  0.0 - 1.9 % Final    Differential Method 02/18/2020 Automated   Final    Sodium 02/18/2020 140  136 - 145 mmol/L Final    Potassium 02/18/2020 4.1  3.5 - 5.1 mmol/L Final    Chloride 02/18/2020 105  95 - 110 mmol/L Final    CO2 02/18/2020 24  23 - 29 mmol/L Final    Glucose 02/18/2020 93  70 - 110 mg/dL Final    BUN, Bld 02/18/2020 13  6 - 20 mg/dL Final    Creatinine 02/18/2020 0.7  0.5 - 1.4 mg/dL Final    Calcium 02/18/2020 9.3  8.7 - 10.5 mg/dL Final    Total Protein 02/18/2020 8.0  6.0 - 8.4 g/dL Final    Albumin 02/18/2020 4.1  3.5 - 5.2 g/dL Final    Total Bilirubin 02/18/2020 0.2  0.1 - 1.0 mg/dL Final    Alkaline Phosphatase 02/18/2020 48* 55 - 135 U/L Final    AST 02/18/2020 42* 10 - 40 U/L Final    ALT 02/18/2020 73* 10 - 44 U/L Final    Anion Gap 02/18/2020 11  8 - 16 mmol/L Final    eGFR if African American 02/18/2020 >60.0  >60 mL/min/1.73 m^2 Final    eGFR if non African American 02/18/2020 >60.0  >60 mL/min/1.73 m^2 Final    TSH 02/18/2020 1.355  0.400 - 4.000 uIU/mL Final    Vit D, 25-Hydroxy 02/18/2020 45  30 - 96 ng/mL Final    Folate 02/18/2020 14.5  4.0 - 24.0 ng/mL Final    Vitamin B-12 02/18/2020 274  210 - 950 pg/mL Final       Medications  Outpatient Encounter Medications as of 3/5/2020   Medication Sig Dispense Refill    diclofenac sodium 1 % Gel  Apply 4 g topically 3 (three) times daily as needed. 2 Tube 2    omeprazole (PRILOSEC) 40 MG capsule       PYLERA 140-125-125 mg per capsule        No facility-administered encounter medications on file as of 3/5/2020.        Assessment - Diagnosis - Goals:     Impression: 30 y/o F with chronic and pervasive anxiety which features overworry, concern about social judgment, harm of accidents coming to her and family.     Dx: generalized anxiety disorder; consider cluster A personality disorders    Treatment Goals:  Specify outcomes written in observable, behavioral terms: reduce anxiety by self-report, decrease avoidance behaviors, improve functioning.     Treatment Plan/Recommendations:   · escitalopram daily trial.   · Lorazepam prn.   · Psychoeducation.   · Encouraged therapy. Language and culture is a barrier.   · Bibliotherapy recommended.     Return to Clinic: 2 months    Counseling time: 10 minutes  Total time: 50 minutes    NEGRITA Montemayor MD  Psychiatry  [facility]  [address] , [place] [address]  [phone]

## 2020-04-23 ENCOUNTER — TELEPHONE (OUTPATIENT)
Dept: PSYCHIATRY | Facility: CLINIC | Age: 30
End: 2020-04-23

## 2020-04-23 ENCOUNTER — PATIENT MESSAGE (OUTPATIENT)
Dept: PSYCHIATRY | Facility: CLINIC | Age: 30
End: 2020-04-23

## 2020-04-30 ENCOUNTER — OFFICE VISIT (OUTPATIENT)
Dept: PSYCHIATRY | Facility: CLINIC | Age: 30
End: 2020-04-30
Payer: COMMERCIAL

## 2020-04-30 DIAGNOSIS — F41.1 GAD (GENERALIZED ANXIETY DISORDER): Primary | ICD-10-CM

## 2020-04-30 PROCEDURE — 99214 PR OFFICE/OUTPT VISIT, EST, LEVL IV, 30-39 MIN: ICD-10-PCS | Mod: 95,,, | Performed by: PSYCHIATRY & NEUROLOGY

## 2020-04-30 PROCEDURE — 99214 OFFICE O/P EST MOD 30 MIN: CPT | Mod: 95,,, | Performed by: PSYCHIATRY & NEUROLOGY

## 2020-04-30 RX ORDER — ESCITALOPRAM OXALATE 10 MG/1
TABLET ORAL
Qty: 30 TABLET | Refills: 3 | Status: SHIPPED | OUTPATIENT
Start: 2020-04-30 | End: 2020-10-27 | Stop reason: SDUPTHER

## 2020-04-30 NOTE — PROGRESS NOTES
Outpatient Psychiatry Follow-up Visit (MD/NP)    2020    Karlee Guevara, a 30 y.o. female, presenting for follow-up visit. Met with patient.    Reason for Encounter: Follow-up, anxiety.     Interval Hx: Patient seen and interviewed for follow-up with her and her  by VIDEO VISIT. They were at their home in [place]. She was last seen about 2 months ago for initial psych evaluation. They report that patient's moods have been anxious, but improved since last visit, despite increased stress due to worsened local COVID outbreak situation. Her health is generally ok. Taking prenatal vitamins, Biotin, no new medications. No new mental health symptoms. They report she has felt more at ease when driving & passenger in car. Been somewhat more at ease when  from son as well.   Has been adherent to medication. Experienced transient nausea with escitalopram, then improved, occurring less frequently and more mildly since then. Didn't tolerate lorazepam. Has been reading Syriac translation of Feeling Good, thinks it's helped her.     Background: Patient is a 30 y/o F who presents with her  for establishment of care for chronic anxiety. She was seen by Ancelmo Marrero for therapy, and he documents the followin y/o Bella CARRERA presented accompanied by her , Herman. Pt with minimal English ability; some modest English comprehension but little confidence speaking it. Pt indicated a desire to have her  translate, though she did agree to us contacting language line  services & hearing their services explained. She also agreed to use their services for portions of sessions going forward to talk without her  present for part of the sessions. Pt's chief presenting complaint was a combination of depression & anxiety. She endorsed depressive symptoms over the past 1-1.5 y, but described as somewhat worse a year ago. Pt became a firsttime new mother in late .  "Reporting increasing feelings of loneliness & helplessness since moving to this country. Pt reported she is college educated, with a double major in business & public administration. She reported several months history of fear of driving; she will ride as a passenger in cars but is afraid to drive, though she has previous driving experience. Also reported frequent tearfulness, feeling over-sensitive, easily triggered to tears. Also fearful of being alone. Reported pervasive sadness, hopelessness, & catastrophizing ruminating thoughts. Feeling intensely dependent on her  for everything, & experiencing obsessive thoughts of something unspecified but horrible happening to him. Stated she has a long-time enjoyment of watching horror movies but now finds many of her anxious thoughts center around images from different horror movies. She endorsed a family of origin history of her father having pervasive severe anxiety. Pt's  is a , & his work brought them to [place]. 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 Pt described health as manageable now; experienced gestational diabetes in 2016.  Feels still fatigued; suffers from degenerative disc disease & bilateral bursitis in her hips. Denied any suicidal or homicidal ideation. Denied any psychosis, cognitive deficit, or substance abuse. Identified therapeutic goals include reduction in anxiety & depression, improvement of coping skills & improving sense of hope & goals for her future.      They confirm this above history today, report ongoing problems with "unreasonable fears" (his words) - e.g. Fears MVA when a passenger or  in a car, won't drive on highways, tries to avoid driving when possible, otherwise suffers through. Both she & he think it's excessive. Afraid of getting on the subway or crossing the street. Reports these fears are chronic, have been present since a teen. Excessively anxious on high school " assessment examinations & when classmates left. Also endorses fears about harm coming to her 1 y/o, and  has noticed that she keeps him close for her own comfort.   Fearful to be alone. Son is somewhat more shy &  is concerned that her overprotectiveness has affected him (though better over time).  feels that she's demands excessive attention-seeking. Excessively anxious about Corona virus early in course of local outbreak.     From [facility]. Here x 4.5 years. +anhedonic. +eats to comfort herself; then regrets it. Sleeps very lightly, wakes fearfully. Sleep is not fully restful. Has had passing, passive SI. Denies active intent or plan. +AH of water running during & just after pregnancy. Never since. No delusions.     Seen by Moustapha Marrero in May 2018, had 3 visits, but didn't feel she was making any progress with therapy. From his the rest of Mr Marrero' documentation:     FamilyHx: mother was excessively anxious (but less so than patient). No others.   MedicalHx: had gestational DM. S/p .   Carpal tunnel  lumbar spondylosis and trochanteric bursitis; Polyarthralgia - has seen rheumatology, pain management. Myofascial pain.   Psychiatric Hx: 1 psychological evaluation.   Social Hx: born in [facility], [place]. Grew up with both parents, 1 sister, 1 brother (older). Says that childhood wasn't happy, mother was overprotective. Was affected by rumors that besmirched her honor (serious allegations). Graduated HS. Denies trauma. Says that she was also hypersensitive to description of trauma from others.  in '15. [place]oved to [place] shortly thereafter. Son is 3. Healthy & doing well.  is a .  has a cousin in [place]. She has a distant family member in [place]. No other family here.   Some socializing. Rarely attends [employer]. 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     Review Of Systems:     GENERAL:  No weight gain or loss  SKIN:  No rashes or  lacerations  HEAD:  No headaches  EYES:  No exophthalmos, jaundice or blindness  EARS:  No dizziness, tinnitus or hearing loss  NOSE:  No changes in smell  MOUTH & THROAT:  No dyskinetic movements or obvious goiter  CHEST:  No shortness of breath, hyperventilation or cough  CARDIOVASCULAR:  No tachycardia or chest pain  ABDOMEN:  No nausea, vomiting, pain, constipation or diarrhea  URINARY:  No frequency, dysuria or sexual dysfunction  ENDOCRINE:  No polydipsia, polyuria  MUSCULOSKELETAL:  No pain or stiffness of the joints  NEUROLOGIC:  No weakness, sensory changes, seizures, confusion, memory loss, tremor or other abnormal movements    Current Evaluation:     Nutritional Screening: Considering the patient's height and weight, medications, medical history and preferences, should a referral be made to the dietitian? no    Constitutional  Vitals:  Most recent vital signs, dated less than 90 days prior to this appointment, were reviewed.       General:  unremarkable, age appropriate     Musculoskeletal  Muscle Strength/Tone:  no tremor, no tic   Gait & Station:  non-ataxic     Psychiatric  Appearance: casually dressed & groomed;   Behavior: calm,   Cooperation: cooperative with assessment  Speech: normal rate, volume, tone  Thought Process: linear, goal-directed  Thought Content: No suicidal or homicidal ideation; no delusions  Affect: anxious  Mood: anxious  Perceptions: No auditory or visual hallucinations  Level of Consciousness: alert throughout interview  Insight: fair  Cognition: Oriented to person, place, time, & situation  Memory: no apparent deficits to general clinical interview; not formally assessed  Attention/Concentration: no apparent deficits to general clinical interview; not formally assessed  Fund of Knowledge: average by vocabulary/education    Laboratory Data  No visits with results within 1 Month(s) from this visit.   Latest known visit with results is:   Office Visit on 03/04/2020   Component  Date Value Ref Range Status    Influenza A, Molecular 03/04/2020 Negative  Negative Final    Influenza B, Molecular 03/04/2020 Negative  Negative Final    Flu A & B Source 03/04/2020 NP   Final    Group A Strep, Molecular 03/04/2020 Negative  Negative Final     Medications  Outpatient Encounter Medications as of 4/30/2020   Medication Sig Dispense Refill    diclofenac sodium 1 % Gel Apply 4 g topically 3 (three) times daily as needed. 2 Tube 2    escitalopram oxalate (LEXAPRO) 10 MG tablet Take 1/2 tablet daily for 7 days then 1 daily thereafter 30 tablet 3    LORazepam (ATIVAN) 1 MG tablet Take 1 tablet (1 mg total) by mouth daily as needed for Anxiety. 30 tablet 2    omeprazole (PRILOSEC) 40 MG capsule       PYLERA 140-125-125 mg per capsule       [DISCONTINUED] escitalopram oxalate (LEXAPRO) 10 MG tablet Take 1/2 tablet daily for 7 days then 1 daily thereafter 30 tablet 2     No facility-administered encounter medications on file as of 4/30/2020.        Assessment - Diagnosis - Goals:     Impression: 29 y/o F with chronic and pervasive anxiety which features overworry, concern about social judgment, harm of accidents coming to her and family. Symptoms improved ongoing with medication & bibliotherapy.     Dx: generalized anxiety disorder; consider cluster A personality disorders    Treatment Goals:  Specify outcomes written in observable, behavioral terms: reduce anxiety by self-report, decrease avoidance behaviors, improve functioning.     Treatment Plan/Recommendations:   · Continue escitalopram.  · Psychoeducation.   · Encouraged therapy prn. Language and culture is a barrier.   · Bibliotherapy recommended.     Return to Clinic: 3 months    NEGRITA Montemayor MD  Psychiatry  [facility]  [address], [place] [address] [phone]

## 2020-05-03 PROBLEM — F41.1 GAD (GENERALIZED ANXIETY DISORDER): Status: ACTIVE | Noted: 2020-05-03

## 2020-06-22 ENCOUNTER — OFFICE VISIT (OUTPATIENT)
Dept: GASTROENTEROLOGY | Facility: CLINIC | Age: 30
End: 2020-06-22
Payer: COMMERCIAL

## 2020-06-22 ENCOUNTER — TELEPHONE (OUTPATIENT)
Dept: GASTROENTEROLOGY | Facility: CLINIC | Age: 30
End: 2020-06-22

## 2020-06-22 VITALS
OXYGEN SATURATION: 98 % | DIASTOLIC BLOOD PRESSURE: 56 MMHG | HEART RATE: 100 BPM | BODY MASS INDEX: 32.58 KG/M2 | HEIGHT: 59 IN | SYSTOLIC BLOOD PRESSURE: 110 MMHG | WEIGHT: 161.63 LBS

## 2020-06-22 DIAGNOSIS — K64.9 HEMORRHOIDS, UNSPECIFIED HEMORRHOID TYPE: ICD-10-CM

## 2020-06-22 DIAGNOSIS — K62.5 RECTAL BLEEDING: Primary | ICD-10-CM

## 2020-06-22 PROCEDURE — 99203 PR OFFICE/OUTPT VISIT, NEW, LEVL III, 30-44 MIN: ICD-10-PCS | Mod: S$GLB,,, | Performed by: PHYSICIAN ASSISTANT

## 2020-06-22 PROCEDURE — 99203 OFFICE O/P NEW LOW 30 MIN: CPT | Mod: S$GLB,,, | Performed by: PHYSICIAN ASSISTANT

## 2020-06-22 PROCEDURE — 3008F PR BODY MASS INDEX (BMI) DOCUMENTED: ICD-10-PCS | Mod: CPTII,S$GLB,, | Performed by: PHYSICIAN ASSISTANT

## 2020-06-22 PROCEDURE — 99999 PR PBB SHADOW E&M-EST. PATIENT-LVL IV: ICD-10-PCS | Mod: PBBFAC,,, | Performed by: PHYSICIAN ASSISTANT

## 2020-06-22 PROCEDURE — 99999 PR PBB SHADOW E&M-EST. PATIENT-LVL IV: CPT | Mod: PBBFAC,,, | Performed by: PHYSICIAN ASSISTANT

## 2020-06-22 PROCEDURE — 3008F BODY MASS INDEX DOCD: CPT | Mod: CPTII,S$GLB,, | Performed by: PHYSICIAN ASSISTANT

## 2020-06-22 RX ORDER — HYDROCORTISONE 25 MG/G
CREAM TOPICAL 2 TIMES DAILY
Qty: 28 G | Refills: 1 | Status: SHIPPED | OUTPATIENT
Start: 2020-06-22 | End: 2020-07-02

## 2020-06-22 RX ORDER — NICOTINE POLACRILEX 2 MG
GUM BUCCAL
COMMUNITY
End: 2021-10-17

## 2020-06-22 NOTE — PROGRESS NOTES
GI OUTPATIENT NOTE    PCP: Kieran Cardenas [address] [id number]    Chief Complaint   Patient presents with    Rectal Bleeding       HISTORY OF PRESENT ILLNESS:  30 y.o. female presents to the GI clinic today for initial evaluation. She is accompanied by her  who translates. The patient complains of rectal bleeding which has been intermittent over the last three months. It usually lasts three days. She describes it as light red blood with wiping. She denies anal pain or itching. She first noticed it after completing a course of antibiotics for H. Pylori. She is followed [facility]. He has treated twice for H. Pylori. Her last treatment was three months ago with Pylera and Omeprazole. She is scheduled for a follow up with him on . Since taking antibiotics, she has gone from 2-3 BMs a day to 4-5 BMs a day. She denies diarrhea and reports formed stools. She denies abdominal pain or change in appetite. She has abdominal fullness which is what she originally saw him for. She has occasional mild nausea with dizziness, but no vomiting. She had an EGD in the last six months, but has never had a colonoscopy.     Past Medical History:   Diagnosis Date    GERD (gastroesophageal reflux disease)     Greater trochanteric bursitis of both hips 2018    H. pylori infection        Past Surgical History:   Procedure Laterality Date     SECTION         Social History     Socioeconomic History    Marital status:      Spouse name: Not on file    Number of children: 1    Years of education: Not on file    Highest education level: Not on file   Occupational History    Occupation: Unemployed   Social Needs    Financial resource strain: Not on file    Food insecurity     Worry: Not on file     Inability: Not on file    Transportation needs     Medical: Not on file     Non-medical: Not on file   Tobacco Use    Smoking status: Never Smoker     Smokeless tobacco: Never Used   Substance and Sexual Activity    Alcohol use: No    Drug use: No    Sexual activity: Yes     Partners: Male   Lifestyle    Physical activity     Days per week: Not on file     Minutes per session: Not on file    Stress: Not on file   Relationships    Social connections     Talks on phone: Not on file     Gets together: Not on file     Attends Religion service: Not on file     Active member of club or organization: Not on file     Attends meetings of clubs or organizations: Not on file     Relationship status: Not on file   Other Topics Concern    Not on file   Social History Narrative    Not on file       No family history on file.    MEDS/ALLERGY:  The patient's medications and allergies were reviewed and updated in the EPIC chart.     Review of Systems   Constitutional: Negative for fever.   HENT: Negative for hearing loss.    Eyes: Negative for visual disturbance.   Respiratory: Positive for cough. Negative for shortness of breath.    Cardiovascular: Negative for chest pain.   Gastrointestinal:        As per HPI.   Genitourinary: Negative for dysuria, frequency and hematuria.   Musculoskeletal: Positive for back pain. Negative for arthralgias.   Skin: Negative for rash.   Neurological: Negative for seizures, syncope, numbness and headaches.   Hematological: Does not bruise/bleed easily.   Psychiatric/Behavioral: The patient is nervous/anxious.        Physical Exam   Constitutional: She is oriented to person, place, and time. Vital signs are normal. She appears well-developed and well-nourished.   HENT:   Head: Normocephalic and atraumatic.   Eyes: EOM are normal.   Neck: Normal range of motion. Neck supple. Carotid bruit is not present. No thyromegaly present.   Cardiovascular: Normal rate and regular rhythm.   No murmur heard.  Pulmonary/Chest: Effort normal and breath sounds normal. No respiratory distress. She has no wheezes.   Abdominal: Soft. Normal appearance and  bowel sounds are normal. She exhibits no distension and no mass. There is no abdominal tenderness.   Genitourinary: Rectum:      External hemorrhoid present.      No rectal mass, anal fissure, tenderness or abnormal anal tone.      Genitourinary Comments: No gross blood.     Musculoskeletal:         General: No edema.   Neurological: She is alert and oriented to person, place, and time. No cranial nerve deficit. Gait normal.   Skin: Skin is warm and dry. No rash noted.   Psychiatric: Thought content normal.       LABS/IMAGING:   Pertinent results were reviewed.     ASSESSMENT:  1. Rectal bleeding    2. Hemorrhoids, unspecified hemorrhoid type        PLAN:  Small hemorrhoids seen on exam. This would fit the description of her bleeding. I recommend a half dose of Miralax daily. She can also use Preparation H OTC for 7-10 or a prescription I will send to the pharmacy. They plan to follow up with Dr. Briseno as planned so I will not check H. Pylori stool antigen at this time.   Patient and  were instructed to call us or [facility] if she develops worsening bleeding or diarrhea. They reported understanding.     Follow up if symptoms worsen or fail to improve.     Thank you for the opportunity to participate in the care of this patient. This consult was designated to me by my supervising physician. He fully participated in the development of the assessment and recommendations.    Joseph Sidhu PA-C.

## 2020-06-22 NOTE — PATIENT INSTRUCTIONS
A prescription was sent to your pharmacy for a medicated cream for hemorrhoids. However, you can try over the counter Preparation H first if you would like. I recommend twice a day for 7-10 days.   I also recommend starting a half dose of Miralax daily.   Please contact us or her primary Gastroenterologist if she develops worsening bleeding, diarrhea, abdominal pain or vomiting.

## 2020-06-22 NOTE — LETTER
June 22, 2020      Kieran Cardenas, NP  [address]  [facility] [address]           [facility]  [address] [place] [id number]  Phone: [phone]  Fax: [phone]          Patient: Karlee Guevara   MR Number: [id number]   YOB: 1990   Date of Visit: 6/22/2020       Dear Kieran Cardenas:    Thank you for referring Karlee Guevara to me for evaluation. Attached you will find relevant portions of my assessment and plan of care.    If you have questions, please do not hesitate to call me. I look forward to following Karlee Guevara along with you.    Sincerely,    BRENTON Floyd  CC:  No Recipients    If you would like to receive this communication electronically, please contact [email or web address] or [phone] to request more information on [facility] access.    For providers and/or their staff who would like to refer a patient to [facility], please contact us through our one-stop-shop provider referral line, [facility], at [phone].    If you feel you have received this communication in error or would no longer like to receive these types of communications, please e-mail [email or web address]

## 2020-06-22 NOTE — TELEPHONE ENCOUNTER
----- Message from Faith Pedersen sent at 6/22/2020  3:27 PM CDT -----  Regarding: --  Pt may be about 12 minutes late , for any questions or concerns please call back at [phone].       Thank you, Faith Pedersen

## 2020-10-22 ENCOUNTER — TELEPHONE (OUTPATIENT)
Dept: FAMILY MEDICINE | Facility: CLINIC | Age: 30
End: 2020-10-22

## 2020-10-22 NOTE — TELEPHONE ENCOUNTER
----- Message from Brandon Hernandez sent at 10/22/2020  4:08 PM CDT -----  Regarding: Referral Request  Type:  Patient Requesting Referral    Who Called: Pts  -Fabio   Does the patient already have the specialty appointment scheduled?: no   If yes, what is the date of that appointment?: no   Referral to What Specialty: Dietician/insulin balance   Reason for Referral: insulin imbalance   Does the patient want the referral with a specific physician?: no   [facility] the [facility]   Patient Requesting a Response?: yes   Would the patient rather a call back or a response via [facility]?  Call back   Best Call Back Number: [phone]  Additional Information: n/a

## 2020-10-22 NOTE — TELEPHONE ENCOUNTER
I need him to get me a copy of most recent lab --- she had lab done back in 2017 and I don't see any lab results to indicate she has insulin resistance.

## 2020-10-22 NOTE — TELEPHONE ENCOUNTER
Called pt and pts  stated that he needs an appt for a nutritionist and wants a referral. Please advise.

## 2020-10-23 NOTE — TELEPHONE ENCOUNTER
Called and spoke with .  He stated that the most recent labs about 2 wks ago was done at the  Clinic.  Are you able to see those labs?     Please advise.

## 2020-10-27 ENCOUNTER — OFFICE VISIT (OUTPATIENT)
Dept: PSYCHIATRY | Facility: CLINIC | Age: 30
End: 2020-10-27
Payer: COMMERCIAL

## 2020-10-27 DIAGNOSIS — F41.1 GAD (GENERALIZED ANXIETY DISORDER): Primary | ICD-10-CM

## 2020-10-27 PROCEDURE — 99213 OFFICE O/P EST LOW 20 MIN: CPT | Mod: 95,,, | Performed by: PSYCHIATRY & NEUROLOGY

## 2020-10-27 PROCEDURE — 99213 PR OFFICE/OUTPT VISIT, EST, LEVL III, 20-29 MIN: ICD-10-PCS | Mod: 95,,, | Performed by: PSYCHIATRY & NEUROLOGY

## 2020-10-27 NOTE — TELEPHONE ENCOUNTER
Called pt and  answered.  I told him that Kieran could not see the lab results.  Pt stated that they had a nutrion appt with [facility] and would keep that appt.  Pt stated they would just follow up with them.       No further action is needed at this time.

## 2020-10-27 NOTE — PROGRESS NOTES
Outpatient Psychiatry Follow-up Visit (MD/NP)    10/27/2020    Karlee Guevara, a 30 y.o. female, presenting for follow-up visit. Met with patient.    Reason for Encounter: Follow-up, anxiety.     Interval Hx: Patient seen and interviewed for follow-up with her and her  by VIDEO VISIT. The were at home. Visiting with friends. Anxiety with travel continues better. More problems with depression. Problems with cholesterol (to see nutritionist), insulin resistance, and hormones. Some nighttime cough - taking xyzal. Adherent to anxiolytic. More sleepy.     Background: Patient is a 30 y/o F who presents with her  for establishment of care for chronic anxiety. She was seen by Ancelmo Marrero for therapy, and he documents the followin y/o Bella CARRERA presented accompanied by her , Herman. Pt with minimal English ability; some modest English comprehension but little confidence speaking it. Pt indicated a desire to have her  translate, though she did agree to us contacting language line  services & hearing their services explained. She also agreed to use their services for portions of sessions going forward to talk without her  present for part of the sessions. Pt's chief presenting complaint was a combination of depression & anxiety. She endorsed depressive symptoms over the past 1-1.5 y, but described as somewhat worse a year ago. Pt became a firsttime new mother in late 2016. Reporting increasing feelings of loneliness & helplessness since moving to this country. Pt reported she is college educated, with a double major in business & public administration. She reported several months history of fear of driving; she will ride as a passenger in cars but is afraid to drive, though she has previous driving experience. Also reported frequent tearfulness, feeling over-sensitive, easily triggered to tears. Also fearful of being alone. Reported pervasive sadness,  "hopelessness, & catastrophizing ruminating thoughts. Feeling intensely dependent on her  for everything, & experiencing obsessive thoughts of something unspecified but horrible happening to him. Stated she has a long-time enjoyment of watching horror movies but now finds many of her anxious thoughts center around images from different horror movies. She endorsed a family of origin history of her father having pervasive severe anxiety. Pt's  is a , & his work brought them to [place]. 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 Pt described health as manageable now; experienced gestational diabetes in 2016.  Feels still fatigued; suffers from degenerative disc disease & bilateral bursitis in her hips. Denied any suicidal or homicidal ideation. Denied any psychosis, cognitive deficit, or substance abuse. Identified therapeutic goals include reduction in anxiety & depression, improvement of coping skills & improving sense of hope & goals for her future.      They confirm this above history today, report ongoing problems with "unreasonable fears" (his words) - e.g. Fears MVA when a passenger or  in a car, won't drive on highways, tries to avoid driving when possible, otherwise suffers through. Both she & he think it's excessive. Afraid of getting on the subway or crossing the street. Reports these fears are chronic, have been present since a teen. Excessively anxious on high school assessment examinations & when classmates left. Also endorses fears about harm coming to her 3 y/o, and  has noticed that she keeps him close for her own comfort.   Fearful to be alone. Son is somewhat more shy &  is concerned that her overprotectiveness has affected him (though better over time).  feels that she's demands excessive attention-seeking. Excessively anxious about Corona virus early in course of local outbreak.     From [facility]. Here x 4.5 years. " +anhedonic. +eats to comfort herself; then regrets it. Sleeps very lightly, wakes fearfully. Sleep is not fully restful. Has had passing, passive SI. Denies active intent or plan. +AH of water running during & just after pregnancy. Never since. No delusions.     Seen by Moustapha Marrero in May 2018, had 3 visits, but didn't feel she was making any progress with therapy. From his the rest of Mr Marrero' documentation:     FamilyHx: mother was excessively anxious (but less so than patient). No others.   MedicalHx: had gestational DM. S/p .   Carpal tunnel  lumbar spondylosis and trochanteric bursitis; Polyarthralgia - has seen rheumatology, pain management. Myofascial pain.   Psychiatric Hx: 1 psychological evaluation.   Social Hx: [place] in [facility]. Grew up with both parents, 1 sister, 1 brother (older). Says that childhood wasn't happy, mother was overprotective. Was affected by rumors that besmirched her honor (serious allegations). Graduated HS. Denies trauma. Says that she was also hypersensitive to description of trauma from others.  in '15. Moved to [place] shortly thereafter. Son is 3. Healthy & doing well.  is a .  has a cousin in [place]. She has a distant family member in [place]. No other family here.   Some socializing. Rarely attends [employer]. 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     Review Of Systems:     GENERAL:  No weight gain or loss  SKIN:  No rashes or lacerations  HEAD:  No headaches  EYES:  No exophthalmos, jaundice or blindness  EARS:  No dizziness, tinnitus or hearing loss  NOSE:  No changes in smell  MOUTH & THROAT:  No dyskinetic movements or obvious goiter  CHEST:  No shortness of breath, hyperventilation or cough  CARDIOVASCULAR:  No tachycardia or chest pain  ABDOMEN:  No nausea, vomiting, pain, constipation or diarrhea  URINARY:  No frequency, dysuria or sexual dysfunction  ENDOCRINE:  No polydipsia, polyuria  MUSCULOSKELETAL:   No pain or stiffness of the joints  NEUROLOGIC:  No weakness, sensory changes, seizures, confusion, memory loss, tremor or other abnormal movements    Current Evaluation:     Nutritional Screening: Considering the patient's height and weight, medications, medical history and preferences, should a referral be made to the dietitian? no    Constitutional  Vitals:  Most recent vital signs, dated less than 90 days prior to this appointment, were reviewed.       General:  unremarkable, age appropriate     Musculoskeletal  Muscle Strength/Tone:  no tremor, no tic   Gait & Station:  non-ataxic     Psychiatric  Appearance: casually dressed & groomed;   Behavior: calm,   Cooperation: cooperative with assessment  Speech: normal rate, volume, tone  Thought Process: linear, goal-directed  Thought Content: No suicidal or homicidal ideation; no delusions  Affect: anxious  Mood: anxious  Perceptions: No auditory or visual hallucinations  Level of Consciousness: alert throughout interview  Insight: fair  Cognition: Oriented to person, place, time, & situation  Memory: no apparent deficits to general clinical interview; not formally assessed  Attention/Concentration: no apparent deficits to general clinical interview; not formally assessed  Fund of Knowledge: average by vocabulary/education    Laboratory Data  No visits with results within 1 Month(s) from this visit.   Latest known visit with results is:   Office Visit on 03/04/2020   Component Date Value Ref Range Status    Influenza A, Molecular 03/04/2020 Negative  Negative Final    Influenza B, Molecular 03/04/2020 Negative  Negative Final    Flu A & B Source 03/04/2020 NP   Final    Group A Strep, Molecular 03/04/2020 Negative  Negative Final     Medications  Outpatient Encounter Medications as of 10/27/2020   Medication Sig Dispense Refill    biotin 1 mg Cap Take by mouth.      diclofenac sodium 1 % Gel Apply 4 g topically 3 (three) times daily as needed. 2 Tube 2     escitalopram oxalate (LEXAPRO) 10 MG tablet Take 1/2 tablet daily for 7 days then 1 daily thereafter 30 tablet 3    LORazepam (ATIVAN) 1 MG tablet Take 1 tablet (1 mg total) by mouth daily as needed for Anxiety. 30 tablet 2    omeprazole (PRILOSEC) 40 MG capsule        No facility-administered encounter medications on file as of 10/27/2020.        Assessment - Diagnosis - Goals:     Impression: 29 y/o F with chronic and pervasive anxiety which features overworry, concern about social judgment, harm of accidents coming to her and family. Symptoms improved ongoing with medication & bibliotherapy.     Dx: generalized anxiety disorder; consider cluster A personality disorders    Treatment Goals:  Specify outcomes written in observable, behavioral terms: reduce anxiety by self-report, decrease avoidance behaviors, improve functioning.     Treatment Plan/Recommendations:   · Continue escitalopram.  · Psychoeducation.   · Encouraged therapy prn. Language and culture is a barrier.   · Bibliotherapy recommended.     Return to Clinic: 3 months    NEGRITA Montemayor MD  Psychiatry  [facility]  [address] , [place] [address] [phone]

## 2020-11-01 RX ORDER — ESCITALOPRAM OXALATE 10 MG/1
TABLET ORAL
Qty: 30 TABLET | Refills: 5 | Status: SHIPPED | OUTPATIENT
Start: 2020-11-01 | End: 2021-06-28 | Stop reason: SDUPTHER

## 2021-01-13 ENCOUNTER — PATIENT OUTREACH (OUTPATIENT)
Dept: ADMINISTRATIVE | Facility: HOSPITAL | Age: 31
End: 2021-01-13

## 2021-06-27 ENCOUNTER — PATIENT MESSAGE (OUTPATIENT)
Dept: PSYCHIATRY | Facility: CLINIC | Age: 31
End: 2021-06-27

## 2021-06-28 RX ORDER — ESCITALOPRAM OXALATE 10 MG/1
10 TABLET ORAL DAILY
Qty: 30 TABLET | Refills: 1 | Status: SHIPPED | OUTPATIENT
Start: 2021-06-28 | End: 2021-10-29

## 2021-07-26 ENCOUNTER — PATIENT MESSAGE (OUTPATIENT)
Dept: PSYCHIATRY | Facility: CLINIC | Age: 31
End: 2021-07-26

## 2021-09-10 ENCOUNTER — IMMUNIZATION (OUTPATIENT)
Dept: PRIMARY CARE CLINIC | Facility: CLINIC | Age: 31
End: 2021-09-10

## 2021-09-10 DIAGNOSIS — Z23 NEED FOR VACCINATION: Primary | ICD-10-CM

## 2021-09-10 PROCEDURE — 91300 COVID-19, MRNA, LNP-S, PF, 30 MCG/0.3 ML DOSE VACCINE: CPT | Mod: S$GLB,,, | Performed by: FAMILY MEDICINE

## 2021-09-10 PROCEDURE — 91300 COVID-19, MRNA, LNP-S, PF, 30 MCG/0.3 ML DOSE VACCINE: ICD-10-PCS | Mod: S$GLB,,, | Performed by: FAMILY MEDICINE

## 2021-09-10 PROCEDURE — 0001A COVID-19, MRNA, LNP-S, PF, 30 MCG/0.3 ML DOSE VACCINE: CPT | Mod: CV19,S$GLB,, | Performed by: FAMILY MEDICINE

## 2021-09-10 PROCEDURE — 0001A COVID-19, MRNA, LNP-S, PF, 30 MCG/0.3 ML DOSE VACCINE: ICD-10-PCS | Mod: CV19,S$GLB,, | Performed by: FAMILY MEDICINE

## 2021-10-01 ENCOUNTER — IMMUNIZATION (OUTPATIENT)
Dept: PRIMARY CARE CLINIC | Facility: CLINIC | Age: 31
End: 2021-10-01
Payer: COMMERCIAL

## 2021-10-01 DIAGNOSIS — Z23 NEED FOR VACCINATION: Primary | ICD-10-CM

## 2021-10-01 PROCEDURE — 91300 COVID-19, MRNA, LNP-S, PF, 30 MCG/0.3 ML DOSE VACCINE: CPT | Mod: PBBFAC | Performed by: FAMILY MEDICINE

## 2021-10-01 PROCEDURE — 0002A COVID-19, MRNA, LNP-S, PF, 30 MCG/0.3 ML DOSE VACCINE: CPT | Mod: CV19,PBBFAC | Performed by: FAMILY MEDICINE

## 2021-10-17 RX ORDER — METFORMIN HYDROCHLORIDE 500 MG/1
500 TABLET, EXTENDED RELEASE ORAL 2 TIMES DAILY
COMMUNITY
Start: 2021-08-21 | End: 2021-10-29

## 2021-10-17 RX ORDER — HYDROMORPHONE HYDROCHLORIDE 2 MG/1
2 TABLET ORAL EVERY 6 HOURS PRN
COMMUNITY
Start: 2021-09-03 | End: 2021-10-29

## 2021-10-17 RX ORDER — GUAIFENESIN 600 MG/1
600 TABLET, EXTENDED RELEASE ORAL
COMMUNITY
End: 2021-10-29

## 2021-10-17 RX ORDER — ONDANSETRON 4 MG/1
TABLET, ORALLY DISINTEGRATING ORAL
COMMUNITY
Start: 2021-03-16 | End: 2021-10-29

## 2021-10-17 RX ORDER — LANCETS 33 GAUGE
EACH MISCELLANEOUS 4 TIMES DAILY
COMMUNITY
Start: 2021-07-08 | End: 2021-10-29

## 2021-10-17 RX ORDER — GLYBURIDE 1.25 MG/1
1.25 TABLET ORAL DAILY
COMMUNITY
Start: 2021-08-21 | End: 2021-10-29

## 2021-10-17 RX ORDER — FAMOTIDINE 20 MG/1
TABLET, FILM COATED ORAL
COMMUNITY
Start: 2021-04-27 | End: 2021-10-29

## 2021-10-17 RX ORDER — GLYBURIDE 2.5 MG/1
2.5 TABLET ORAL NIGHTLY
COMMUNITY
Start: 2021-07-29 | End: 2021-10-29

## 2021-10-17 RX ORDER — PROMETHAZINE HYDROCHLORIDE 25 MG/1
TABLET ORAL
COMMUNITY
Start: 2021-03-16 | End: 2021-10-29

## 2021-10-17 RX ORDER — LANCETS 30 GAUGE
EACH MISCELLANEOUS
COMMUNITY
Start: 2021-06-02 | End: 2021-10-29

## 2021-10-17 RX ORDER — METFORMIN HYDROCHLORIDE 500 MG/1
TABLET, EXTENDED RELEASE ORAL
COMMUNITY
Start: 2021-06-22 | End: 2021-10-29

## 2021-10-17 RX ORDER — GLYBURIDE 1.25 MG/1
TABLET ORAL
COMMUNITY
Start: 2021-07-23 | End: 2021-10-29

## 2021-10-17 RX ORDER — OMEPRAZOLE 20 MG/1
20 CAPSULE, DELAYED RELEASE ORAL
COMMUNITY
End: 2021-10-29

## 2021-10-17 RX ORDER — DIPHENHYDRAMINE HCL 25 MG
TABLET ORAL
COMMUNITY
Start: 2021-05-26 | End: 2021-10-29

## 2021-10-17 RX ORDER — CALCIUM CITRATE/VITAMIN D3 200MG-6.25
1 TABLET ORAL 4 TIMES DAILY
COMMUNITY
Start: 2021-08-21 | End: 2021-10-29

## 2021-10-17 RX ORDER — PROMETHAZINE HYDROCHLORIDE AND CODEINE PHOSPHATE 6.25; 1 MG/5ML; MG/5ML
5 SOLUTION ORAL EVERY 6 HOURS PRN
COMMUNITY
Start: 2021-08-12 | End: 2021-10-29

## 2021-10-17 RX ORDER — FUROSEMIDE 20 MG/1
20 TABLET ORAL DAILY
COMMUNITY
Start: 2021-09-03 | End: 2021-10-29

## 2021-12-16 ENCOUNTER — CLINICAL SUPPORT (OUTPATIENT)
Dept: PEDIATRICS | Facility: CLINIC | Age: 31
End: 2021-12-16
Payer: COMMERCIAL

## 2021-12-16 PROCEDURE — 90688 IIV4 VACCINE SPLT 0.5 ML IM: CPT | Mod: S$GLB,,, | Performed by: PEDIATRICS

## 2021-12-16 PROCEDURE — 90688 FLU VACCINE (QUAD) 6-35MO W/ PRESERVATIVE: ICD-10-PCS | Mod: S$GLB,,, | Performed by: PEDIATRICS

## 2021-12-16 PROCEDURE — 90471 IMMUNIZATION ADMIN: CPT | Mod: S$GLB,,, | Performed by: PEDIATRICS

## 2021-12-16 PROCEDURE — 90471 FLU VACCINE (QUAD) 6-35MO W/ PRESERVATIVE: ICD-10-PCS | Mod: S$GLB,,, | Performed by: PEDIATRICS

## 2022-07-19 ENCOUNTER — OFFICE VISIT (OUTPATIENT)
Dept: PRIMARY CARE CLINIC | Facility: CLINIC | Age: 32
End: 2022-07-19
Payer: COMMERCIAL

## 2022-07-19 VITALS
OXYGEN SATURATION: 100 % | RESPIRATION RATE: 18 BRPM | SYSTOLIC BLOOD PRESSURE: 108 MMHG | HEART RATE: 108 BPM | WEIGHT: 167.56 LBS | BODY MASS INDEX: 31.66 KG/M2 | DIASTOLIC BLOOD PRESSURE: 70 MMHG

## 2022-07-19 DIAGNOSIS — R73.01 IMPAIRED FASTING BLOOD SUGAR: ICD-10-CM

## 2022-07-19 DIAGNOSIS — E78.5 DYSLIPIDEMIA: ICD-10-CM

## 2022-07-19 DIAGNOSIS — L70.9 ACNE, UNSPECIFIED ACNE TYPE: ICD-10-CM

## 2022-07-19 DIAGNOSIS — E66.9 OBESITY (BMI 30.0-34.9): ICD-10-CM

## 2022-07-19 DIAGNOSIS — E88.819 INSULIN RESISTANCE: Primary | ICD-10-CM

## 2022-07-19 DIAGNOSIS — E28.2 PCOS (POLYCYSTIC OVARIAN SYNDROME): ICD-10-CM

## 2022-07-19 PROCEDURE — 99204 PR OFFICE/OUTPT VISIT, NEW, LEVL IV, 45-59 MIN: ICD-10-PCS | Mod: S$GLB,,, | Performed by: FAMILY MEDICINE

## 2022-07-19 PROCEDURE — 3008F BODY MASS INDEX DOCD: CPT | Mod: CPTII,S$GLB,, | Performed by: FAMILY MEDICINE

## 2022-07-19 PROCEDURE — 1160F RVW MEDS BY RX/DR IN RCRD: CPT | Mod: CPTII,S$GLB,, | Performed by: FAMILY MEDICINE

## 2022-07-19 PROCEDURE — 3008F PR BODY MASS INDEX (BMI) DOCUMENTED: ICD-10-PCS | Mod: CPTII,S$GLB,, | Performed by: FAMILY MEDICINE

## 2022-07-19 PROCEDURE — 3074F SYST BP LT 130 MM HG: CPT | Mod: CPTII,S$GLB,, | Performed by: FAMILY MEDICINE

## 2022-07-19 PROCEDURE — 99999 PR PBB SHADOW E&M-EST. PATIENT-LVL IV: ICD-10-PCS | Mod: PBBFAC,,, | Performed by: FAMILY MEDICINE

## 2022-07-19 PROCEDURE — 3078F DIAST BP <80 MM HG: CPT | Mod: CPTII,S$GLB,, | Performed by: FAMILY MEDICINE

## 2022-07-19 PROCEDURE — 3078F PR MOST RECENT DIASTOLIC BLOOD PRESSURE < 80 MM HG: ICD-10-PCS | Mod: CPTII,S$GLB,, | Performed by: FAMILY MEDICINE

## 2022-07-19 PROCEDURE — 1160F PR REVIEW ALL MEDS BY PRESCRIBER/CLIN PHARMACIST DOCUMENTED: ICD-10-PCS | Mod: CPTII,S$GLB,, | Performed by: FAMILY MEDICINE

## 2022-07-19 PROCEDURE — 3074F PR MOST RECENT SYSTOLIC BLOOD PRESSURE < 130 MM HG: ICD-10-PCS | Mod: CPTII,S$GLB,, | Performed by: FAMILY MEDICINE

## 2022-07-19 PROCEDURE — 1159F MED LIST DOCD IN RCRD: CPT | Mod: CPTII,S$GLB,, | Performed by: FAMILY MEDICINE

## 2022-07-19 PROCEDURE — 1159F PR MEDICATION LIST DOCUMENTED IN MEDICAL RECORD: ICD-10-PCS | Mod: CPTII,S$GLB,, | Performed by: FAMILY MEDICINE

## 2022-07-19 PROCEDURE — 99204 OFFICE O/P NEW MOD 45 MIN: CPT | Mod: S$GLB,,, | Performed by: FAMILY MEDICINE

## 2022-07-19 PROCEDURE — 99999 PR PBB SHADOW E&M-EST. PATIENT-LVL IV: CPT | Mod: PBBFAC,,, | Performed by: FAMILY MEDICINE

## 2022-07-19 RX ORDER — METFORMIN HYDROCHLORIDE 500 MG/1
500 TABLET, EXTENDED RELEASE ORAL 2 TIMES DAILY WITH MEALS
Qty: 60 TABLET | Refills: 2 | Status: SHIPPED | OUTPATIENT
Start: 2022-07-19 | End: 2023-10-09 | Stop reason: ALTCHOICE

## 2022-07-19 NOTE — PROGRESS NOTES
Subjective:       Patient ID: Karlee Guevara is a 32 y.o. female.  Referring MD: Toya  PCP:   BMI noted  Diet: varied  Exercise/Activity: light  Sleep: fair; has infant child (10 mos)  Stressors: new child   Anxiety/Depression Screen/PHQ-2:  Pmhx, fam hx, soc hx, surg hx, allergies, med list reviewed     present for appt.     Pt reports     Saw Rheum and Derm here.     ? Hx of fibromyalgia. Has seen endo. Some low   Has not been able to do IF.     Left hand numbness/hx of the area. Had cts surgery on the right. R hand dominant. Does have some brace for sleeping.   On occasion has median nerve numbness/no weakness.   Saw Dr CORBETT here (Rheum). Pt does have some trigger point pain.     Hx of vit D def-corrected.     Hx of acne, PCOS. May consider spironolactone when no longer breastfeeding. Is currently so not glp-1 candidate.   No rash. NO night sweats.     Pt considering IUD/pargard.    Breakfast: egg, cheese, jam, white bread (homemade). Does not eat whole grain  Lunch: leftovers  Dinner: rice, meat (chicken), salmon, vegetables  Sweets: loves chocolate, dark chocolate  Soda: mineral water; not sweetened; occasional orange juice        Chief Complaint: No chief complaint on file.  Pt saw Dr Pedraza [facility] closed.     HPI  Review of Systems   Constitutional: Negative for activity change, appetite change, fatigue and fever.   HENT: Negative for mouth dryness and goiter.    Eyes: Negative for visual disturbance.   Respiratory: Negative for apnea, cough, chest tightness and shortness of breath.    Cardiovascular: Negative for chest pain, palpitations and leg swelling.   Gastrointestinal: Negative for abdominal pain, constipation and diarrhea.   Endocrine: Negative for cold intolerance, heat intolerance, polydipsia, polyphagia and polyuria.   Genitourinary: Negative for frequency and menstrual problem.   Musculoskeletal: Positive for myalgias. Negative for arthralgias.    Integumentary:  Negative for color change and rash.   Psychiatric/Behavioral: Negative for sleep disturbance. The patient is not nervous/anxious.          Objective:      Physical Exam  Vitals and nursing note reviewed.   Constitutional:       General: She is not in acute distress.  HENT:      Head: Normocephalic and atraumatic.      Mouth/Throat:      Pharynx: Oropharynx is clear.   Eyes:      General: No scleral icterus.     Pupils: Pupils are equal, round, and reactive to light.   Neck:      Comments: No TM  Cardiovascular:      Rate and Rhythm: Normal rate and regular rhythm.      Pulses: Normal pulses.      Heart sounds: Normal heart sounds. No murmur heard.    No friction rub. No gallop.   Pulmonary:      Effort: Pulmonary effort is normal. No respiratory distress.      Breath sounds: Normal breath sounds. No wheezing, rhonchi or rales.   Abdominal:      General: Bowel sounds are normal. There is no distension.      Palpations: Abdomen is soft.      Tenderness: There is no abdominal tenderness.   Musculoskeletal:         General: No swelling.      Cervical back: Normal range of motion and neck supple. No tenderness.   Lymphadenopathy:      Cervical: No cervical adenopathy.   Skin:     General: Skin is warm.      Findings: No erythema or rash.   Neurological:      Mental Status: She is alert and oriented to person, place, and time.   Psychiatric:         Mood and Affect: Mood normal.         Behavior: Behavior normal.         Assessment:       Problem List Items Addressed This Visit    None         1. Insulin resistance    2. Impaired fasting blood sugar    3. Dyslipidemia    4. PCOS (polycystic ovarian syndrome)    5. Obesity (BMI 30.0-34.9)    6. Acne, unspecified acne type        Plan:       Insulin resistance  -     Ambulatory referral/consult to Family Practice  -     metFORMIN (GLUCOPHAGE-XR) 500 MG ER 24hr tablet; Take 1 tablet (500 mg total) by mouth 2 (two) times daily with meals.  Dispense: 60  tablet; Refill: 2  -     Ambulatory Referral/Consult to Nutrition Services - [facility]; Future; Expected date: 07/26/2022  Pt advised only take ONE metformin;   Return to clinic here in 2 weeks    Impaired fasting blood sugar  -     metFORMIN (GLUCOPHAGE-XR) 500 MG ER 24hr tablet; Take 1 tablet (500 mg total) by mouth 2 (two) times daily with meals.  Dispense: 60 tablet; Refill: 2    Dyslipidemia  D/w pt would like to recheck your tsh and lipids in Aug or Sept    PCOS (polycystic ovarian syndrome)  -     metFORMIN (GLUCOPHAGE-XR) 500 MG ER 24hr tablet; Take 1 tablet (500 mg total) by mouth 2 (two) times daily with meals.  Dispense: 60 tablet; Refill: 2  -     Ambulatory Referral/Consult to Nutrition Services - [facility]; Future; Expected date: 07/26/2022  -     Ambulatory referral/consult to Dermatology; Future; Expected date: 07/26/2022    Obesity (BMI 30.0-34.9)  -     metFORMIN (GLUCOPHAGE-XR) 500 MG ER 24hr tablet; Take 1 tablet (500 mg total) by mouth 2 (two) times daily with meals.  Dispense: 60 tablet; Refill: 2  -     Ambulatory Referral/Consult to Nutrition Services - [facility]; Future; Expected date: 07/26/2022    Acne, unspecified acne type  -     Ambulatory referral/consult to Dermatology; Future; Expected date: 07/26/2022         Pt to restart metformin; (d/w pt and  one pill once daily)  d/w her weight loss and importance of lifestyle interventions  Neds new rx    Recheck thyroid Aug/Sept--last pt had on phone was within normal limits TSH 4/ free T4.8  Referral derm    Pt would like to consider  ozempic once not breast feeding if covered by insurance  Lifestyle intervention d/w pt at length  Food log next week  Pt more contemplative now for change    Pt advised needs primary care     Pt would like more structured meal plan   Pt to call back and do   45 min spent in care/coordination of care  Will check on food sensitivity testing    Ext labs  reviewed

## 2022-07-27 ENCOUNTER — PATIENT MESSAGE (OUTPATIENT)
Dept: PRIMARY CARE CLINIC | Facility: CLINIC | Age: 32
End: 2022-07-27
Payer: COMMERCIAL

## 2022-11-25 ENCOUNTER — OFFICE VISIT (OUTPATIENT)
Dept: INTERNAL MEDICINE | Facility: CLINIC | Age: 32
End: 2022-11-25
Payer: COMMERCIAL

## 2022-11-25 ENCOUNTER — HOSPITAL ENCOUNTER (OUTPATIENT)
Dept: CARDIOLOGY | Facility: HOSPITAL | Age: 32
Discharge: HOME | End: 2022-11-25
Attending: NURSE PRACTITIONER
Payer: COMMERCIAL

## 2022-11-25 VITALS
BODY MASS INDEX: 32.34 KG/M2 | SYSTOLIC BLOOD PRESSURE: 118 MMHG | RESPIRATION RATE: 18 BRPM | DIASTOLIC BLOOD PRESSURE: 72 MMHG | OXYGEN SATURATION: 99 % | HEART RATE: 103 BPM | TEMPERATURE: 98 F | HEIGHT: 61 IN | WEIGHT: 171.31 LBS

## 2022-11-25 DIAGNOSIS — R42 DIZZINESS: ICD-10-CM

## 2022-11-25 DIAGNOSIS — R00.0 TACHYCARDIA: ICD-10-CM

## 2022-11-25 DIAGNOSIS — E66.9 CLASS 1 OBESITY WITH BODY MASS INDEX (BMI) OF 32.0 TO 32.9 IN ADULT, UNSPECIFIED OBESITY TYPE, UNSPECIFIED WHETHER SERIOUS COMORBIDITY PRESENT: ICD-10-CM

## 2022-11-25 DIAGNOSIS — R42 DIZZINESS: Primary | ICD-10-CM

## 2022-11-25 PROCEDURE — 99215 OFFICE O/P EST HI 40 MIN: CPT | Mod: S$GLB,,, | Performed by: NURSE PRACTITIONER

## 2022-11-25 PROCEDURE — 3078F DIAST BP <80 MM HG: CPT | Mod: CPTII,S$GLB,, | Performed by: NURSE PRACTITIONER

## 2022-11-25 PROCEDURE — 99999 PR PBB SHADOW E&M-EST. PATIENT-LVL IV: CPT | Mod: PBBFAC,,, | Performed by: NURSE PRACTITIONER

## 2022-11-25 PROCEDURE — 93010 ELECTROCARDIOGRAM REPORT: CPT | Mod: ,,, | Performed by: INTERNAL MEDICINE

## 2022-11-25 PROCEDURE — 3078F PR MOST RECENT DIASTOLIC BLOOD PRESSURE < 80 MM HG: ICD-10-PCS | Mod: CPTII,S$GLB,, | Performed by: NURSE PRACTITIONER

## 2022-11-25 PROCEDURE — 3008F PR BODY MASS INDEX (BMI) DOCUMENTED: ICD-10-PCS | Mod: CPTII,S$GLB,, | Performed by: NURSE PRACTITIONER

## 2022-11-25 PROCEDURE — 3008F BODY MASS INDEX DOCD: CPT | Mod: CPTII,S$GLB,, | Performed by: NURSE PRACTITIONER

## 2022-11-25 PROCEDURE — 3074F SYST BP LT 130 MM HG: CPT | Mod: CPTII,S$GLB,, | Performed by: NURSE PRACTITIONER

## 2022-11-25 PROCEDURE — 3074F PR MOST RECENT SYSTOLIC BLOOD PRESSURE < 130 MM HG: ICD-10-PCS | Mod: CPTII,S$GLB,, | Performed by: NURSE PRACTITIONER

## 2022-11-25 PROCEDURE — 1159F PR MEDICATION LIST DOCUMENTED IN MEDICAL RECORD: ICD-10-PCS | Mod: CPTII,S$GLB,, | Performed by: NURSE PRACTITIONER

## 2022-11-25 PROCEDURE — 93010 EKG 12-LEAD: ICD-10-PCS | Mod: ,,, | Performed by: INTERNAL MEDICINE

## 2022-11-25 PROCEDURE — 1159F MED LIST DOCD IN RCRD: CPT | Mod: CPTII,S$GLB,, | Performed by: NURSE PRACTITIONER

## 2022-11-25 PROCEDURE — 99215 PR OFFICE/OUTPT VISIT, EST, LEVL V, 40-54 MIN: ICD-10-PCS | Mod: S$GLB,,, | Performed by: NURSE PRACTITIONER

## 2022-11-25 PROCEDURE — 99999 PR PBB SHADOW E&M-EST. PATIENT-LVL IV: ICD-10-PCS | Mod: PBBFAC,,, | Performed by: NURSE PRACTITIONER

## 2022-11-25 PROCEDURE — 93005 ELECTROCARDIOGRAM TRACING: CPT

## 2022-11-25 NOTE — PROGRESS NOTES
Chief Complaint  Chief Complaint   Patient presents with    Dizziness     2m          HPI     HPI  Karlee Guevara is a 32 y.o. female with medical diagnoses as listed in the medical history and problem list that presents for Dizziness  This patient is new to me. GUERITA deferred. Patient prefers to use .  (Annabelle Guevara) on speaker phone as historian and .     Dizziness x 2 months: IUD placed 2 months. LMP: 11/15/2022. Reports cycles are heavy since IUD placement. She attributes dizziness to IUD placement given dizziness parallels IUD placement. Dizziness is random, sporadic and not constant. Denies syncopal episodes or head trauma. Denies acute blood loss.   Pertinent negatives are chest pain/palpitations/tightness/discomfort, SOB, GI upset, urinary/bowel changes, unexplained weight loss/gain, headaches/syncope.          History     PAST MEDICAL HISTORY:  Past Medical History:   Diagnosis Date    Dysmetabolic syndrome X 2021 2:40:12 PM    [facility] Historical - Endocrine/Metabolic/Immune: Insulin resistance-No Additional Notes    Dysmetabolic syndrome X 2021 2:40:12 PM    [facility] Historical - Endocrine/Metabolic/Immune: Insulin resistance-No Additional Notes    GERD (gastroesophageal reflux disease)     Greater trochanteric bursitis of both hips 2018    H. pylori infection     Insulin resistance     PCOS (polycystic ovarian syndrome)     Polycystic ovaries 2020 11:18:08 AM    [facility] Historical - DO NOT USE: PCOS (polycystic ovarian syndrome)-oligomenorrhea, elevated DHEAS    Polycystic ovaries 2020 11:18:08 AM    [facility] Historical - DO NOT USE: PCOS (polycystic ovarian syndrome)-oligomenorrhea, elevated DHEAS    Postpartum depression        PAST SURGICAL HISTORY:  Past Surgical History:   Procedure Laterality Date     SECTION      INTRAUTERINE DEVICE INSERTION         SOCIAL HISTORY:  Social History     Socioeconomic History     "Marital status:     Number of children: 1   Occupational History    Occupation: Unemployed   Tobacco Use    Smoking status: Never    Smokeless tobacco: Never   Substance and Sexual Activity    Alcohol use: Never    Drug use: No    Sexual activity: Yes     Partners: Male     Comment: postpartum       FAMILY HISTORY:  History reviewed. No pertinent family history.    ALLERGIES AND MEDICATIONS: updated and reviewed.  Review of patient's allergies indicates:  No Known Allergies  Current Outpatient Medications   Medication Sig Dispense Refill    metFORMIN (GLUCOPHAGE-XR) 500 MG ER 24hr tablet Take 1 tablet (500 mg total) by mouth 2 (two) times daily with meals. 60 tablet 2    BIOTIN ORAL       prenatal vit 40/iron/folic/dha (PRENATAL MULTI-DHA ORAL) Take by mouth.       Current Facility-Administered Medications   Medication Dose Route Frequency Provider Last Rate Last Admin    copper intrauterine device 380 square mm  mm  380 mm Intrauterine  Melvi Pittman MD   380 mm at 07/26/22 1510           Exam     ROS  Review of Systems   Constitutional:  Negative for appetite change, chills, fatigue and fever.   HENT:  Negative for congestion, ear pain, postnasal drip, rhinorrhea, sinus pressure, sneezing and sore throat.    Respiratory:  Negative for shortness of breath.    Cardiovascular:  Negative for chest pain and palpitations.   Gastrointestinal:  Negative for abdominal pain, constipation, diarrhea, nausea and vomiting.   Genitourinary:  Negative for dysuria.   Musculoskeletal:  Negative for arthralgias.   Neurological:  Positive for dizziness. Negative for headaches.   Psychiatric/Behavioral:  Negative for sleep disturbance.          Physical Exam  Vitals:    11/25/22 0810   BP: 118/72   BP Location: Right arm   Patient Position: Sitting   BP Method: Large (Manual)   Pulse: 103   Resp: 18   Temp: 98.4 °F (36.9 °C)   TempSrc: Tympanic   SpO2: 99%   Weight: 77.7 kg (171 lb 4.8 oz)   Height: 5' 1" (1.549 m)    " "Body mass index is 32.37 kg/m².  Weight: 77.7 kg (171 lb 4.8 oz)   Height: 5' 1" (154.9 cm)   Physical Exam  Constitutional:       General: She is not in acute distress.     Appearance: Normal appearance.   HENT:      Head: Normocephalic and atraumatic.      Right Ear: External ear normal.      Left Ear: External ear normal.      Nose: Nose normal.   Eyes:      Pupils: Pupils are equal, round, and reactive to light.   Cardiovascular:      Rate and Rhythm: Normal rate and regular rhythm.   Pulmonary:      Effort: Pulmonary effort is normal. No respiratory distress.      Breath sounds: Normal breath sounds. No wheezing.   Abdominal:      General: Bowel sounds are normal.      Palpations: Abdomen is soft.   Musculoskeletal:      Cervical back: Neck supple.   Lymphadenopathy:      Cervical: No cervical adenopathy.   Skin:     General: Skin is warm and dry.   Neurological:      Mental Status: She is alert and oriented to person, place, and time. Mental status is at baseline.   Psychiatric:         Mood and Affect: Mood normal.           Health Maintenance         Date Due Completion Date    Hepatitis C Screening Never done ---    COVID-19 Vaccine (3 - Booster for Pfizer series) 11/26/2021 10/1/2021    Influenza Vaccine (1) 09/01/2022 12/16/2021    Cervical Cancer Screening 07/01/2025 10/29/2021    TETANUS VACCINE 10/25/2027 10/25/2017              Assessment & Plan     Assessment & Plan  Problem List Items Addressed This Visit    None  Visit Diagnoses       Dizziness    -  Primary  - Imaging and labs to screen for acute changes    Relevant Orders    CBC Auto Differential    Comprehensive Metabolic Panel    TSH    Urinalysis, Reflex to Urine Culture Urine, Clean Catch    SCHEDULED EKG 12-LEAD (to Muse)    Iron and TIBC    FERRITIN    Pregnancy, urine rapid    Tachycardia      -As above    Relevant Orders    CBC Auto Differential    SCHEDULED EKG 12-LEAD (to Muse)    Iron and TIBC    FERRITIN    Class 1 obesity with body " mass index (BMI) of 32.0 to 32.9 in adult, unspecified obesity type, unspecified whether serious comorbidity present      -Diet and exercise discussed with patient.               Health Maintenance reviewed: Deferred per patient    Follow-up: If symptoms worsen or fail to improve    30+ minutes of total time spent on the encounter, which includes face to face time and non-face to face time preparing to see the patient (eg, review of tests), Obtaining and/or reviewing separately obtained history, documenting clinical information in the electronic or other health record, independently interpreting results (not separately reported) and communicating results to the patient/family/caregiver, or Care coordination (not separately reported).

## 2022-11-28 ENCOUNTER — PATIENT MESSAGE (OUTPATIENT)
Dept: INTERNAL MEDICINE | Facility: CLINIC | Age: 32
End: 2022-11-28
Payer: COMMERCIAL

## 2022-11-29 ENCOUNTER — OFFICE VISIT (OUTPATIENT)
Dept: INTERNAL MEDICINE | Facility: CLINIC | Age: 32
End: 2022-11-29
Payer: COMMERCIAL

## 2022-11-29 DIAGNOSIS — R42 VERTIGO: Primary | ICD-10-CM

## 2022-11-29 PROCEDURE — 1159F PR MEDICATION LIST DOCUMENTED IN MEDICAL RECORD: ICD-10-PCS | Mod: CPTII,95,, | Performed by: FAMILY MEDICINE

## 2022-11-29 PROCEDURE — 99215 OFFICE O/P EST HI 40 MIN: CPT | Mod: 95,,, | Performed by: FAMILY MEDICINE

## 2022-11-29 PROCEDURE — 1160F RVW MEDS BY RX/DR IN RCRD: CPT | Mod: CPTII,95,, | Performed by: FAMILY MEDICINE

## 2022-11-29 PROCEDURE — 1160F PR REVIEW ALL MEDS BY PRESCRIBER/CLIN PHARMACIST DOCUMENTED: ICD-10-PCS | Mod: CPTII,95,, | Performed by: FAMILY MEDICINE

## 2022-11-29 PROCEDURE — 99215 PR OFFICE/OUTPT VISIT, EST, LEVL V, 40-54 MIN: ICD-10-PCS | Mod: 95,,, | Performed by: FAMILY MEDICINE

## 2022-11-29 PROCEDURE — 1159F MED LIST DOCD IN RCRD: CPT | Mod: CPTII,95,, | Performed by: FAMILY MEDICINE

## 2022-11-29 RX ORDER — MECLIZINE HYDROCHLORIDE 25 MG/1
25 TABLET ORAL 3 TIMES DAILY
Qty: 90 TABLET | Refills: 0 | Status: SHIPPED | OUTPATIENT
Start: 2022-11-29 | End: 2023-10-09 | Stop reason: SDUPTHER

## 2022-11-29 NOTE — PROGRESS NOTES
Subjective:   Patient ID: Karlee Guevara is a 32 y.o. female.  Chief Complaint:  Dizziness    The patient location is: Home  The chief complaint leading to consultation is: Dizziness    Visit type: audiovisual    Face to Face time with patient: 30 minutes  40 minutes of total time spent on the encounter, which includes face to face time and non-face to face time preparing to see the patient (eg, review of tests), Obtaining and/or reviewing separately obtained history, Documenting clinical information in the electronic or other health record, Independently interpreting results (not separately reported) and communicating results to the patient/family/caregiver, or Care coordination (not separately reported).     Each patient to whom he or she provides medical services by telemedicine is:  (1) informed of the relationship between the physician and patient and the respective role of any other health care provider with respect to management of the patient; and (2) notified that he or she may decline to receive medical services by telemedicine and may withdraw from such care at any time.    Presents for follow-up after seeing KENDY for dizziness   CBC, CMP, TSH, iron/ferritin test all normal   EKG normal    Overall poor historian difficult to determine if dizziness versus lightheadedness versus possible occipital type headache  Seems to have started 2-3 months ago  Believes possible trigger was after IUD placement   Currently has 1-year-old baby that she is breastfeeding    Seen by Neurology August 2022  However that visit seem to be more for nonspecific myofascial neck pain with polymyalgia and polyarthralgia   Autoimmune workup was negative   Advised to see rheumatology for fibromyalgia     Chart also shows history of anxiety  Previously evaluated and treated with Lexapro by Psychiatry   Discontinued medication prior to this pregnancy   Symptom pattern at that time was not similar to today's  complaints    Patient reports relief of symptoms with caffeine    Dizziness:   Chronicity:  Chronic  Onset:  More than 1 month ago  Progression since onset:  Unchanged  Frequency:  Every few hours  Pain Scale:  0/10  Severity:  Moderate  Duration:  5 minutes  Dizziness characteristics:  Off-balance, lightheaded/impending faint and sensation of movement   Associated symptoms: light-headedness.no hearing loss, no ear congestion, no ear pain, no fever, no headaches, no tinnitus, no nausea, no vomiting, no diaphoresis, no aural fullness, no weakness, no visual disturbances, no syncope, no palpitations, no panic, no facial weakness, no slurred speech, no numbness in extremities and no chest pain.  Aggravated by:  Nothing  Treatments tried:  Nothing   PMH includes: anxiety.    Current Outpatient Medications:     meclizine (ANTIVERT) 25 mg tablet, Take 1 tablet (25 mg total) by mouth 3 (three) times daily., Disp: 90 tablet, Rfl: 0    metFORMIN (GLUCOPHAGE-XR) 500 MG ER 24hr tablet, Take 1 tablet (500 mg total) by mouth 2 (two) times daily with meals., Disp: 60 tablet, Rfl: 2    Review of Systems   Constitutional:  Negative for activity change, diaphoresis, fatigue, fever and unexpected weight change.   HENT:  Negative for congestion, ear discharge, ear pain, facial swelling, hearing loss, postnasal drip, rhinorrhea, sinus pressure, sinus pain, sneezing, sore throat, tinnitus, trouble swallowing and voice change.    Eyes:  Negative for photophobia, pain, discharge, redness, itching and visual disturbance.   Respiratory:  Negative for chest tightness, shortness of breath and wheezing.    Cardiovascular:  Negative for chest pain, palpitations and syncope.   Gastrointestinal:  Negative for blood in stool, constipation, diarrhea, nausea and vomiting.   Endocrine: Negative for polydipsia and polyuria.   Genitourinary:  Negative for difficulty urinating, dysuria, hematuria and menstrual problem.   Musculoskeletal:  Negative for  arthralgias, joint swelling, myalgias, neck pain and neck stiffness.   Skin:  Negative for rash.   Neurological:  Positive for dizziness and light-headedness. Negative for tremors, syncope, facial asymmetry, speech difficulty, weakness, numbness and headaches.   Hematological:  Negative for adenopathy.   Psychiatric/Behavioral:  Negative for agitation, confusion, decreased concentration, dysphoric mood and sleep disturbance. The patient is not nervous/anxious.      Objective:   LMP 11/25/2022     Physical Exam  Constitutional:       General: She is not in acute distress.     Appearance: Normal appearance. She is well-developed. She is not ill-appearing.   Eyes:      Conjunctiva/sclera:      Right eye: Right conjunctiva is not injected.      Left eye: Left conjunctiva is not injected.   Pulmonary:      Effort: Pulmonary effort is normal. No tachypnea or accessory muscle usage.   Neurological:      Mental Status: She is alert and oriented to person, place, and time.   Psychiatric:         Mood and Affect: Mood and affect normal.       Assessment:       ICD-10-CM ICD-9-CM   1. Vertigo  R42 780.4     Plan:   Vertigo  -     meclizine (ANTIVERT) 25 mg tablet; Take 1 tablet (25 mg total) by mouth 3 (three) times daily.  Dispense: 90 tablet; Refill: 0    Unfortunately, unclear what her actual complaint is regarding true vertigo versus lightheadedness verses more of a panic attack   Recommend trial of Antivert 25 mg 3 times a day to see if help symptoms  If does, then ENT referral for chronic vertigo  If does not help, then recommend restart Lexapro for possible underlying anxiety disorder and evaluation by Cardiology for episodic lightheadedness   Visit was completed with spouse as   Expressed understanding and agreement to the above plan   Discussed in future, telemedicine visits would not be recommended for any follow-up medical care so that a  could be provided in office    40+ minutes of total time  spent on the encounter, which includes face to face time and non-face to face time preparing to see the patient (eg, review of tests), Obtaining and/or reviewing separately obtained history, documenting clinical information in the electronic or other health record, independently interpreting results (not separately reported) and communicating results to the patient/family/caregiver, or Care coordination (not separately reported).

## 2022-12-14 ENCOUNTER — TELEPHONE (OUTPATIENT)
Dept: INTERNAL MEDICINE | Facility: CLINIC | Age: 32
End: 2022-12-14
Payer: COMMERCIAL

## 2022-12-14 NOTE — TELEPHONE ENCOUNTER
Contact pt regarding appointment schedule for December 27 th @ 8 am, pt was ok and stated they did not want to schedule an appointment at the moment they will call when they are ready to schedule. VERNA

## 2023-10-09 ENCOUNTER — LAB VISIT (OUTPATIENT)
Dept: LAB | Facility: HOSPITAL | Age: 33
End: 2023-10-09
Attending: INTERNAL MEDICINE
Payer: COMMERCIAL

## 2023-10-09 ENCOUNTER — OFFICE VISIT (OUTPATIENT)
Dept: PRIMARY CARE CLINIC | Facility: CLINIC | Age: 33
End: 2023-10-09
Payer: COMMERCIAL

## 2023-10-09 VITALS
HEART RATE: 96 BPM | BODY MASS INDEX: 33.71 KG/M2 | WEIGHT: 178.56 LBS | TEMPERATURE: 99 F | DIASTOLIC BLOOD PRESSURE: 72 MMHG | OXYGEN SATURATION: 98 % | HEIGHT: 61 IN | SYSTOLIC BLOOD PRESSURE: 130 MMHG

## 2023-10-09 DIAGNOSIS — E88.819 INSULIN RESISTANCE: ICD-10-CM

## 2023-10-09 DIAGNOSIS — Z00.00 LABORATORY EXAM ORDERED AS PART OF ROUTINE GENERAL MEDICAL EXAMINATION: ICD-10-CM

## 2023-10-09 DIAGNOSIS — R42 VERTIGO: ICD-10-CM

## 2023-10-09 DIAGNOSIS — K64.4 EXTERNAL HEMORRHOIDS: Primary | ICD-10-CM

## 2023-10-09 DIAGNOSIS — H81.11 BPPV (BENIGN PAROXYSMAL POSITIONAL VERTIGO), RIGHT: ICD-10-CM

## 2023-10-09 LAB
BASOPHILS # BLD AUTO: 0.03 K/UL (ref 0–0.2)
BASOPHILS NFR BLD: 0.3 % (ref 0–1.9)
DIFFERENTIAL METHOD: ABNORMAL
EOSINOPHIL # BLD AUTO: 0.1 K/UL (ref 0–0.5)
EOSINOPHIL NFR BLD: 1.4 % (ref 0–8)
ERYTHROCYTE [DISTWIDTH] IN BLOOD BY AUTOMATED COUNT: 12.9 % (ref 11.5–14.5)
ESTIMATED AVG GLUCOSE: 108 MG/DL (ref 68–131)
HBA1C MFR BLD: 5.4 % (ref 4–5.6)
HCT VFR BLD AUTO: 40.9 % (ref 37–48.5)
HGB BLD-MCNC: 13.1 G/DL (ref 12–16)
IMM GRANULOCYTES # BLD AUTO: 0.01 K/UL (ref 0–0.04)
IMM GRANULOCYTES NFR BLD AUTO: 0.1 % (ref 0–0.5)
LYMPHOCYTES # BLD AUTO: 2.8 K/UL (ref 1–4.8)
LYMPHOCYTES NFR BLD: 28.9 % (ref 18–48)
MCH RBC QN AUTO: 26.1 PG (ref 27–31)
MCHC RBC AUTO-ENTMCNC: 32 G/DL (ref 32–36)
MCV RBC AUTO: 82 FL (ref 82–98)
MONOCYTES # BLD AUTO: 0.5 K/UL (ref 0.3–1)
MONOCYTES NFR BLD: 5.1 % (ref 4–15)
NEUTROPHILS # BLD AUTO: 6.1 K/UL (ref 1.8–7.7)
NEUTROPHILS NFR BLD: 64.2 % (ref 38–73)
NRBC BLD-RTO: 0 /100 WBC
PLATELET # BLD AUTO: 289 K/UL (ref 150–450)
PMV BLD AUTO: 10.8 FL (ref 9.2–12.9)
RBC # BLD AUTO: 5.01 M/UL (ref 4–5.4)
WBC # BLD AUTO: 9.5 K/UL (ref 3.9–12.7)

## 2023-10-09 PROCEDURE — 1160F RVW MEDS BY RX/DR IN RCRD: CPT | Mod: CPTII,S$GLB,, | Performed by: INTERNAL MEDICINE

## 2023-10-09 PROCEDURE — 99999 PR PBB SHADOW E&M-EST. PATIENT-LVL IV: CPT | Mod: PBBFAC,,, | Performed by: INTERNAL MEDICINE

## 2023-10-09 PROCEDURE — 3075F PR MOST RECENT SYSTOLIC BLOOD PRESS GE 130-139MM HG: ICD-10-PCS | Mod: CPTII,S$GLB,, | Performed by: INTERNAL MEDICINE

## 2023-10-09 PROCEDURE — 85025 COMPLETE CBC W/AUTO DIFF WBC: CPT | Performed by: INTERNAL MEDICINE

## 2023-10-09 PROCEDURE — 81003 URINALYSIS AUTO W/O SCOPE: CPT | Performed by: INTERNAL MEDICINE

## 2023-10-09 PROCEDURE — 99203 PR OFFICE/OUTPT VISIT, NEW, LEVL III, 30-44 MIN: ICD-10-PCS | Mod: S$GLB,,, | Performed by: INTERNAL MEDICINE

## 2023-10-09 PROCEDURE — 36415 COLL VENOUS BLD VENIPUNCTURE: CPT | Mod: PN | Performed by: INTERNAL MEDICINE

## 2023-10-09 PROCEDURE — 3044F HG A1C LEVEL LT 7.0%: CPT | Mod: CPTII,S$GLB,, | Performed by: INTERNAL MEDICINE

## 2023-10-09 PROCEDURE — 84443 ASSAY THYROID STIM HORMONE: CPT | Performed by: INTERNAL MEDICINE

## 2023-10-09 PROCEDURE — 3078F PR MOST RECENT DIASTOLIC BLOOD PRESSURE < 80 MM HG: ICD-10-PCS | Mod: CPTII,S$GLB,, | Performed by: INTERNAL MEDICINE

## 2023-10-09 PROCEDURE — 1159F MED LIST DOCD IN RCRD: CPT | Mod: CPTII,S$GLB,, | Performed by: INTERNAL MEDICINE

## 2023-10-09 PROCEDURE — 80053 COMPREHEN METABOLIC PANEL: CPT | Performed by: INTERNAL MEDICINE

## 2023-10-09 PROCEDURE — 1159F PR MEDICATION LIST DOCUMENTED IN MEDICAL RECORD: ICD-10-PCS | Mod: CPTII,S$GLB,, | Performed by: INTERNAL MEDICINE

## 2023-10-09 PROCEDURE — 3075F SYST BP GE 130 - 139MM HG: CPT | Mod: CPTII,S$GLB,, | Performed by: INTERNAL MEDICINE

## 2023-10-09 PROCEDURE — 1160F PR REVIEW ALL MEDS BY PRESCRIBER/CLIN PHARMACIST DOCUMENTED: ICD-10-PCS | Mod: CPTII,S$GLB,, | Performed by: INTERNAL MEDICINE

## 2023-10-09 PROCEDURE — 99203 OFFICE O/P NEW LOW 30 MIN: CPT | Mod: S$GLB,,, | Performed by: INTERNAL MEDICINE

## 2023-10-09 PROCEDURE — 3008F PR BODY MASS INDEX (BMI) DOCUMENTED: ICD-10-PCS | Mod: CPTII,S$GLB,, | Performed by: INTERNAL MEDICINE

## 2023-10-09 PROCEDURE — 3078F DIAST BP <80 MM HG: CPT | Mod: CPTII,S$GLB,, | Performed by: INTERNAL MEDICINE

## 2023-10-09 PROCEDURE — 83036 HEMOGLOBIN GLYCOSYLATED A1C: CPT | Performed by: INTERNAL MEDICINE

## 2023-10-09 PROCEDURE — 99999 PR PBB SHADOW E&M-EST. PATIENT-LVL IV: ICD-10-PCS | Mod: PBBFAC,,, | Performed by: INTERNAL MEDICINE

## 2023-10-09 PROCEDURE — 3044F PR MOST RECENT HEMOGLOBIN A1C LEVEL <7.0%: ICD-10-PCS | Mod: CPTII,S$GLB,, | Performed by: INTERNAL MEDICINE

## 2023-10-09 PROCEDURE — 86803 HEPATITIS C AB TEST: CPT | Performed by: INTERNAL MEDICINE

## 2023-10-09 PROCEDURE — 3008F BODY MASS INDEX DOCD: CPT | Mod: CPTII,S$GLB,, | Performed by: INTERNAL MEDICINE

## 2023-10-09 RX ORDER — HYDROCORTISONE ACETATE 25 MG/1
25 SUPPOSITORY RECTAL 2 TIMES DAILY
Qty: 20 SUPPOSITORY | Refills: 0 | Status: SHIPPED | OUTPATIENT
Start: 2023-10-09 | End: 2023-10-19

## 2023-10-09 RX ORDER — MECLIZINE HYDROCHLORIDE 25 MG/1
25 TABLET ORAL 3 TIMES DAILY PRN
Qty: 90 TABLET | Refills: 0 | Status: SHIPPED | OUTPATIENT
Start: 2023-10-09 | End: 2023-11-08

## 2023-10-09 NOTE — PROGRESS NOTES
Subjective     Patient ID: Karlee Guevara is a 33 y.o. female.    Chief Complaint: Dizziness and Hemorrhoids      HPI  Here to discuss hemorrhoids.   also helps with hx.  Feeling well otw.  Has a h/o right sided BPPV that she uses PT for.  Would like a copy of the exercises for home use as well.  Describes periodic itchy small hemorrhoid with at times small amount of blood on paper.  No severe pain.  Interested in the lifestyle and wellness clinic.    Past Medical History:   Diagnosis Date    Dysmetabolic syndrome X 2021 2:40:12 PM    [facility] - Endocrine/Metabolic/Immune: Insulin resistance-No Additional Notes    Dysmetabolic syndrome X 2021 2:40:12 PM    [facility] - Endocrine/Metabolic/Immune: Insulin resistance-No Additional Notes    GERD (gastroesophageal reflux disease)     Greater trochanteric bursitis of both hips 2018    H. pylori infection     Insulin resistance     PCOS (polycystic ovarian syndrome)     Polycystic ovaries 2020 11:18:08 AM    [facility] - DO NOT USE: PCOS (polycystic ovarian syndrome)-oligomenorrhea, elevated DHEAS    Polycystic ovaries 2020 11:18:08 AM    [facility] - DO NOT USE: PCOS (polycystic ovarian syndrome)-oligomenorrhea, elevated DHEAS    Postpartum depression      Review of patient's allergies indicates:  No Known Allergies  Past Surgical History:   Procedure Laterality Date     SECTION      INTRAUTERINE DEVICE INSERTION       History reviewed. No pertinent family history.  Social History     Socioeconomic History    Marital status:     Number of children: 1   Occupational History    Occupation: Unemployed   Tobacco Use    Smoking status: Never    Smokeless tobacco: Never   Substance and Sexual Activity    Alcohol use: Never    Drug use: No    Sexual activity: Yes     Partners: Male     Comment: postpartum         /72   Pulse 96   Temp 98.7 °F (37.1 °C)   Ht 5'  "1" (1.549 m)   Wt 81 kg (178 lb 9.2 oz)   LMP 09/14/2023 (Approximate)   SpO2 98%   BMI 33.74 kg/m²   Outpatient Medications as of 10/9/2023   Medication Sig Dispense Refill    meclizine (ANTIVERT) 25 mg tablet Take 1 tablet (25 mg total) by mouth 3 (three) times daily as needed for Dizziness. 90 tablet 0    prenatal vit 40/iron/folic/dha (PRENATAL MULTI-DHA ORAL) Take by mouth.       Facility-Administered Medications as of 10/9/2023   Medication Dose Route Frequency Provider Last Rate Last Admin    copper intrauterine device 380 square mm  mm  380 mm Intrauterine  Melvi Pittman MD   380 mm at 07/26/22 1510       Review of Systems   Neurological:  Positive for dizziness.   All other systems reviewed and are negative.         Objective     Physical Exam  Constitutional:       General: She is not in acute distress.     Appearance: Normal appearance. She is obese. She is not ill-appearing, toxic-appearing or diaphoretic.   HENT:      Head: Normocephalic and atraumatic.   Cardiovascular:      Rate and Rhythm: Normal rate.   Pulmonary:      Effort: Pulmonary effort is normal.   Musculoskeletal:      Cervical back: Neck supple.   Neurological:      General: No focal deficit present.      Mental Status: She is alert and oriented to person, place, and time.   Psychiatric:         Mood and Affect: Mood normal.         Behavior: Behavior normal.            Assessment and Plan     1. External hemorrhoids  -     hydrocortisone-pramoxine (PROCTOFOAM-HS) rectal foam; Place 1 applicator rectally 2 (two) times daily.  Dispense: 30 g; Refill: 5  -     hydrocortisone (ANUSOL-HC) 25 mg suppository; Place 1 suppository (25 mg total) rectally 2 (two) times daily. for 10 days  Dispense: 20 suppository; Refill: 0    2. Vertigo  -     meclizine (ANTIVERT) 25 mg tablet; Take 1 tablet (25 mg total) by mouth 3 (three) times daily as needed for Dizziness.  Dispense: 90 tablet; Refill: 0    3. BPPV (benign paroxysmal positional " vertigo), right  -     meclizine (ANTIVERT) 25 mg tablet; Take 1 tablet (25 mg total) by mouth 3 (three) times daily as needed for Dizziness.  Dispense: 90 tablet; Refill: 0    4. Laboratory exam ordered as part of routine general medical examination  -     Urinalysis; Future; Expected date: 10/09/2023  -     Hemoglobin A1C; Future; Expected date: 10/09/2023  -     TSH; Future; Expected date: 10/09/2023  -     Comprehensive Metabolic Panel; Future; Expected date: 10/09/2023  -     CBC Auto Differential; Future; Expected date: 10/09/2023  -     HEPATITIS C ANTIBODY; Future; Expected date: 10/09/2023    5. BMI 33.0-33.9,adult  -     Ambulatory referral/consult to [facility]; Future; Expected date: 10/16/2023    6. Insulin resistance  -     Urinalysis; Future; Expected date: 10/09/2023  -     Hemoglobin A1C; Future; Expected date: 10/09/2023  -     TSH; Future; Expected date: 10/09/2023  -     Comprehensive Metabolic Panel; Future; Expected date: 10/09/2023  -     CBC Auto Differential; Future; Expected date: 10/09/2023  -     HEPATITIS C ANTIBODY; Future; Expected date: 10/09/2023         Info given.      Immunization History   Administered Date(s) Administered    COVID-19, MRNA, LN-S, PF (Pfizer) (Purple Cap) 09/10/2021, 10/01/2021    Influenza 02/18/2020, 09/28/2020, 11/17/2020    Influenza - Quadrivalent 10/25/2017, 12/12/2019, 09/11/2020, 12/16/2021    Influenza - Quadrivalent - PF *Preferred* (6 months and older) 01/06/2016, 10/25/2017    MMR 10/25/2017    PPD Test 01/18/2016    Tdap 10/25/2017    Varicella 10/25/2017       I spent a total of 30 minutes on the day of the visit.This includes face to face time and non-face to face time preparing to see the patient (eg, review of tests), obtaining and/or reviewing separately obtained history, documenting clinical information in the electronic or other health record, independently interpreting results and communicating results to the  patient/family/caregiver, or care coordinator.

## 2023-10-10 LAB
ALBUMIN SERPL BCP-MCNC: 4.1 G/DL (ref 3.5–5.2)
ALP SERPL-CCNC: 54 U/L (ref 55–135)
ALT SERPL W/O P-5'-P-CCNC: 18 U/L (ref 10–44)
ANION GAP SERPL CALC-SCNC: 14 MMOL/L (ref 8–16)
AST SERPL-CCNC: 16 U/L (ref 10–40)
BILIRUB SERPL-MCNC: 0.3 MG/DL (ref 0.1–1)
BILIRUB UR QL STRIP: NEGATIVE
BUN SERPL-MCNC: 11 MG/DL (ref 6–20)
CALCIUM SERPL-MCNC: 9.7 MG/DL (ref 8.7–10.5)
CHLORIDE SERPL-SCNC: 107 MMOL/L (ref 95–110)
CLARITY UR REFRACT.AUTO: CLEAR
CO2 SERPL-SCNC: 18 MMOL/L (ref 23–29)
COLOR UR AUTO: YELLOW
CREAT SERPL-MCNC: 0.7 MG/DL (ref 0.5–1.4)
EST. GFR  (NO RACE VARIABLE): >60 ML/MIN/1.73 M^2
GLUCOSE SERPL-MCNC: 94 MG/DL (ref 70–110)
GLUCOSE UR QL STRIP: NEGATIVE
HCV AB SERPL QL IA: NORMAL
HGB UR QL STRIP: NEGATIVE
KETONES UR QL STRIP: NEGATIVE
LEUKOCYTE ESTERASE UR QL STRIP: NEGATIVE
NITRITE UR QL STRIP: NEGATIVE
PH UR STRIP: 6 [PH] (ref 5–8)
POTASSIUM SERPL-SCNC: 4.2 MMOL/L (ref 3.5–5.1)
PROT SERPL-MCNC: 8 G/DL (ref 6–8.4)
PROT UR QL STRIP: NEGATIVE
SODIUM SERPL-SCNC: 139 MMOL/L (ref 136–145)
SP GR UR STRIP: 1.02 (ref 1–1.03)
TSH SERPL DL<=0.005 MIU/L-ACNC: 1.5 UIU/ML (ref 0.4–4)
URN SPEC COLLECT METH UR: NORMAL

## 2023-10-30 ENCOUNTER — PATIENT MESSAGE (OUTPATIENT)
Dept: PRIMARY CARE CLINIC | Facility: CLINIC | Age: 33
End: 2023-10-30
Payer: COMMERCIAL

## 2023-10-30 DIAGNOSIS — K64.4 EXTERNAL HEMORRHOIDS WITH COMPLICATION: Primary | ICD-10-CM

## 2023-10-31 ENCOUNTER — PATIENT MESSAGE (OUTPATIENT)
Dept: PRIMARY CARE CLINIC | Facility: CLINIC | Age: 33
End: 2023-10-31
Payer: COMMERCIAL

## 2023-11-02 ENCOUNTER — OFFICE VISIT (OUTPATIENT)
Dept: SURGERY | Facility: CLINIC | Age: 33
End: 2023-11-02
Payer: COMMERCIAL

## 2023-11-02 ENCOUNTER — TELEPHONE (OUTPATIENT)
Dept: SURGERY | Facility: CLINIC | Age: 33
End: 2023-11-02
Payer: COMMERCIAL

## 2023-11-02 VITALS
HEART RATE: 104 BPM | DIASTOLIC BLOOD PRESSURE: 71 MMHG | RESPIRATION RATE: 18 BRPM | SYSTOLIC BLOOD PRESSURE: 121 MMHG | OXYGEN SATURATION: 97 % | BODY MASS INDEX: 33.63 KG/M2 | WEIGHT: 178 LBS

## 2023-11-02 DIAGNOSIS — K60.2 ANAL FISSURE: Primary | ICD-10-CM

## 2023-11-02 DIAGNOSIS — K62.89 RECTAL PAIN: ICD-10-CM

## 2023-11-02 DIAGNOSIS — K62.89 RECTAL PAIN: Primary | ICD-10-CM

## 2023-11-02 PROCEDURE — 3044F HG A1C LEVEL LT 7.0%: CPT | Mod: CPTII,S$GLB,, | Performed by: STUDENT IN AN ORGANIZED HEALTH CARE EDUCATION/TRAINING PROGRAM

## 2023-11-02 PROCEDURE — 99999 PR PBB SHADOW E&M-EST. PATIENT-LVL III: ICD-10-PCS | Mod: PBBFAC,,, | Performed by: STUDENT IN AN ORGANIZED HEALTH CARE EDUCATION/TRAINING PROGRAM

## 2023-11-02 PROCEDURE — 1159F MED LIST DOCD IN RCRD: CPT | Mod: CPTII,S$GLB,, | Performed by: STUDENT IN AN ORGANIZED HEALTH CARE EDUCATION/TRAINING PROGRAM

## 2023-11-02 PROCEDURE — 3078F PR MOST RECENT DIASTOLIC BLOOD PRESSURE < 80 MM HG: ICD-10-PCS | Mod: CPTII,S$GLB,, | Performed by: STUDENT IN AN ORGANIZED HEALTH CARE EDUCATION/TRAINING PROGRAM

## 2023-11-02 PROCEDURE — 99204 OFFICE O/P NEW MOD 45 MIN: CPT | Mod: S$GLB,,, | Performed by: STUDENT IN AN ORGANIZED HEALTH CARE EDUCATION/TRAINING PROGRAM

## 2023-11-02 PROCEDURE — 3044F PR MOST RECENT HEMOGLOBIN A1C LEVEL <7.0%: ICD-10-PCS | Mod: CPTII,S$GLB,, | Performed by: STUDENT IN AN ORGANIZED HEALTH CARE EDUCATION/TRAINING PROGRAM

## 2023-11-02 PROCEDURE — 3008F BODY MASS INDEX DOCD: CPT | Mod: CPTII,S$GLB,, | Performed by: STUDENT IN AN ORGANIZED HEALTH CARE EDUCATION/TRAINING PROGRAM

## 2023-11-02 PROCEDURE — 3078F DIAST BP <80 MM HG: CPT | Mod: CPTII,S$GLB,, | Performed by: STUDENT IN AN ORGANIZED HEALTH CARE EDUCATION/TRAINING PROGRAM

## 2023-11-02 PROCEDURE — 99204 PR OFFICE/OUTPT VISIT, NEW, LEVL IV, 45-59 MIN: ICD-10-PCS | Mod: S$GLB,,, | Performed by: STUDENT IN AN ORGANIZED HEALTH CARE EDUCATION/TRAINING PROGRAM

## 2023-11-02 PROCEDURE — 3074F SYST BP LT 130 MM HG: CPT | Mod: CPTII,S$GLB,, | Performed by: STUDENT IN AN ORGANIZED HEALTH CARE EDUCATION/TRAINING PROGRAM

## 2023-11-02 PROCEDURE — 99999 PR PBB SHADOW E&M-EST. PATIENT-LVL III: CPT | Mod: PBBFAC,,, | Performed by: STUDENT IN AN ORGANIZED HEALTH CARE EDUCATION/TRAINING PROGRAM

## 2023-11-02 PROCEDURE — 1159F PR MEDICATION LIST DOCUMENTED IN MEDICAL RECORD: ICD-10-PCS | Mod: CPTII,S$GLB,, | Performed by: STUDENT IN AN ORGANIZED HEALTH CARE EDUCATION/TRAINING PROGRAM

## 2023-11-02 PROCEDURE — 3074F PR MOST RECENT SYSTOLIC BLOOD PRESSURE < 130 MM HG: ICD-10-PCS | Mod: CPTII,S$GLB,, | Performed by: STUDENT IN AN ORGANIZED HEALTH CARE EDUCATION/TRAINING PROGRAM

## 2023-11-02 PROCEDURE — 3008F PR BODY MASS INDEX (BMI) DOCUMENTED: ICD-10-PCS | Mod: CPTII,S$GLB,, | Performed by: STUDENT IN AN ORGANIZED HEALTH CARE EDUCATION/TRAINING PROGRAM

## 2023-11-02 NOTE — PROGRESS NOTES
Subjective:       Patient ID: Karlee Guevara is a 33 y.o. female.    Chief Complaint: Rectal Pain    Pt is 34yo F with posterior anal fissure. She reports 6 months razor blade like pain and bleeding with each bowel movements. Constipated using exlax with minimal relief. Avoids bowel movements and has fear of toileting. Does have some bleeding. She has tried steroid suppositories with minimal relief.     History obtained from patient and  for translating.        Colonoscopy: none    Pathology: none    SH: denies smoking, EtOH or drug use    FH: Denies FH of colorectal cancer, polyps or IBD          Objective:      Physical Exam  Constitutional:       Appearance: She is well-developed.   HENT:      Head: Normocephalic and atraumatic.   Eyes:      Conjunctiva/sclera: Conjunctivae normal.      Pupils: Pupils are equal, round, and reactive to light.   Neck:      Thyroid: No thyromegaly.   Cardiovascular:      Rate and Rhythm: Normal rate and regular rhythm.   Pulmonary:      Effort: Pulmonary effort is normal. No respiratory distress.   Abdominal:      General: There is no distension.      Palpations: Abdomen is soft. There is no mass.      Tenderness: There is no abdominal tenderness.   Genitourinary:     Comments: Posterior anal fissure with sentinel pile. MARYCARMEN deferred 2/2 pain  Musculoskeletal:         General: No tenderness. Normal range of motion.      Cervical back: Normal range of motion.   Skin:     General: Skin is warm and dry.      Capillary Refill: Capillary refill takes less than 2 seconds.   Neurological:      General: No focal deficit present.      Mental Status: She is alert and oriented to person, place, and time.         Assessment:       1. Anal fissure    2. Rectal pain        Plan:       - will trial topical dilt/lido prior to surgical intervention  - educated on proper bowel management with stool softeners, laxatives, fiber, water, avoid straining and long time on toilet  - RTC 6  weeks or sooner if pain persists        Angella Cabral MD  Colon and Rectal Surgery  [facility]

## 2023-11-02 NOTE — TELEPHONE ENCOUNTER
Called and spoke with patient's  regarding appointment for hemorrhoids. Informed  a referral has been placed for patient to see a colon and rectal female surgeon. Also informed  they should be receiving a call today from Jazlyn. Patient's  verbalized understanding.    ----- Message from Samantha Harper PA-C sent at 11/2/2023  9:54 AM CDT -----  This patient is on my schedule for hemorrhoids tomorrow, but I placed a referral to Dr. Cabral. Jazlyn, please reach out. Thanks!    Samantha Harper PA-C  [facility]

## 2023-12-11 ENCOUNTER — OFFICE VISIT (OUTPATIENT)
Dept: FAMILY MEDICINE | Facility: CLINIC | Age: 33
End: 2023-12-11
Payer: COMMERCIAL

## 2023-12-11 VITALS
BODY MASS INDEX: 33.51 KG/M2 | RESPIRATION RATE: 18 BRPM | WEIGHT: 177.5 LBS | HEIGHT: 61 IN | DIASTOLIC BLOOD PRESSURE: 70 MMHG | TEMPERATURE: 98 F | SYSTOLIC BLOOD PRESSURE: 104 MMHG | HEART RATE: 85 BPM | OXYGEN SATURATION: 98 %

## 2023-12-11 DIAGNOSIS — J06.9 VIRAL UPPER RESPIRATORY TRACT INFECTION: Primary | ICD-10-CM

## 2023-12-11 PROCEDURE — 99999 PR PBB SHADOW E&M-EST. PATIENT-LVL IV: ICD-10-PCS | Mod: PBBFAC,,, | Performed by: INTERNAL MEDICINE

## 2023-12-11 PROCEDURE — 99213 OFFICE O/P EST LOW 20 MIN: CPT | Mod: S$GLB,,, | Performed by: INTERNAL MEDICINE

## 2023-12-11 PROCEDURE — 1159F PR MEDICATION LIST DOCUMENTED IN MEDICAL RECORD: ICD-10-PCS | Mod: CPTII,S$GLB,, | Performed by: INTERNAL MEDICINE

## 2023-12-11 PROCEDURE — 3078F PR MOST RECENT DIASTOLIC BLOOD PRESSURE < 80 MM HG: ICD-10-PCS | Mod: CPTII,S$GLB,, | Performed by: INTERNAL MEDICINE

## 2023-12-11 PROCEDURE — 3074F PR MOST RECENT SYSTOLIC BLOOD PRESSURE < 130 MM HG: ICD-10-PCS | Mod: CPTII,S$GLB,, | Performed by: INTERNAL MEDICINE

## 2023-12-11 PROCEDURE — 3008F BODY MASS INDEX DOCD: CPT | Mod: CPTII,S$GLB,, | Performed by: INTERNAL MEDICINE

## 2023-12-11 PROCEDURE — 3044F PR MOST RECENT HEMOGLOBIN A1C LEVEL <7.0%: ICD-10-PCS | Mod: CPTII,S$GLB,, | Performed by: INTERNAL MEDICINE

## 2023-12-11 PROCEDURE — 1160F PR REVIEW ALL MEDS BY PRESCRIBER/CLIN PHARMACIST DOCUMENTED: ICD-10-PCS | Mod: CPTII,S$GLB,, | Performed by: INTERNAL MEDICINE

## 2023-12-11 PROCEDURE — 99213 PR OFFICE/OUTPT VISIT, EST, LEVL III, 20-29 MIN: ICD-10-PCS | Mod: S$GLB,,, | Performed by: INTERNAL MEDICINE

## 2023-12-11 PROCEDURE — 1160F RVW MEDS BY RX/DR IN RCRD: CPT | Mod: CPTII,S$GLB,, | Performed by: INTERNAL MEDICINE

## 2023-12-11 PROCEDURE — 3078F DIAST BP <80 MM HG: CPT | Mod: CPTII,S$GLB,, | Performed by: INTERNAL MEDICINE

## 2023-12-11 PROCEDURE — 3044F HG A1C LEVEL LT 7.0%: CPT | Mod: CPTII,S$GLB,, | Performed by: INTERNAL MEDICINE

## 2023-12-11 PROCEDURE — 3074F SYST BP LT 130 MM HG: CPT | Mod: CPTII,S$GLB,, | Performed by: INTERNAL MEDICINE

## 2023-12-11 PROCEDURE — 1159F MED LIST DOCD IN RCRD: CPT | Mod: CPTII,S$GLB,, | Performed by: INTERNAL MEDICINE

## 2023-12-11 PROCEDURE — 3008F PR BODY MASS INDEX (BMI) DOCUMENTED: ICD-10-PCS | Mod: CPTII,S$GLB,, | Performed by: INTERNAL MEDICINE

## 2023-12-11 PROCEDURE — 99999 PR PBB SHADOW E&M-EST. PATIENT-LVL IV: CPT | Mod: PBBFAC,,, | Performed by: INTERNAL MEDICINE

## 2023-12-11 RX ORDER — MAGNESIUM GLUCONATE 27.5 (500)
TABLET ORAL ONCE
COMMUNITY

## 2023-12-11 RX ORDER — PROMETHAZINE HYDROCHLORIDE AND DEXTROMETHORPHAN HYDROBROMIDE 6.25; 15 MG/5ML; MG/5ML
5 SYRUP ORAL EVERY 4 HOURS PRN
Qty: 118 ML | Refills: 0 | Status: SHIPPED | OUTPATIENT
Start: 2023-12-11 | End: 2023-12-21

## 2023-12-11 NOTE — PROGRESS NOTES
Subjective     Patient ID: Karlee Guevara is a 33 y.o. female.    Chief Complaint: URI and Cough      HPI  Patient presents due to URI. She reports she has been having a cough and sore throat for 5 days. She has tried Mucinex and Ricola with limited relief.  Review of Systems   Constitutional:  Negative for chills and fatigue.   HENT:  Positive for sore throat.    Respiratory:  Positive for cough. Negative for chest tightness and shortness of breath.    Cardiovascular:  Negative for chest pain and palpitations.   Gastrointestinal:  Negative for abdominal pain, constipation, diarrhea, nausea and vomiting.   Genitourinary:  Negative for frequency and urgency.   Neurological:  Negative for dizziness, numbness and headaches.          Objective       Current Outpatient Medications:     dextromethorphan-guaiFENesin  mg (MUCINEX DM)  mg per 12 hr tablet, Take 1 tablet by mouth every 12 (twelve) hours., Disp: , Rfl:     diltiazem HCl (DILTIAZEM 2% - LIDOCAINE 5% CREAM), Apply 1 application  topically 3 (three) times daily., Disp: 30 g, Rfl: 1    hydrocortisone-pramoxine (PROCTOFOAM-HS) rectal foam, Place 1 applicator rectally 2 (two) times daily., Disp: 30 g, Rfl: 5    magnesium gluconate 27.5 mg magne- sium (500 mg) Tab, Take by mouth once., Disp: , Rfl:     prenatal vit 40/iron/folic/dha (PRENATAL MULTI-DHA ORAL), Take by mouth., Disp: , Rfl:     meclizine (ANTIVERT) 25 mg tablet, Take 1 tablet (25 mg total) by mouth 3 (three) times daily as needed for Dizziness., Disp: 90 tablet, Rfl: 0    promethazine-dextromethorphan (PROMETHAZINE-DM) 6.25-15 mg/5 mL Syrp, Take 5 mLs by mouth every 4 (four) hours as needed (cough)., Disp: 118 mL, Rfl: 0    Current Facility-Administered Medications:     copper intrauterine device 380 square mm  mm, 380 mm, Intrauterine, , Melvi Pittman MD, 380 mm at 07/26/22 1510     Physical Exam  Constitutional:       General: She is not in acute distress.      Appearance: Normal appearance. She is obese.   HENT:      Head: Normocephalic and atraumatic.   Cardiovascular:      Rate and Rhythm: Normal rate and regular rhythm.      Heart sounds: Normal heart sounds. No murmur heard.     No friction rub. No gallop.   Pulmonary:      Effort: Pulmonary effort is normal.      Breath sounds: Normal breath sounds. No wheezing, rhonchi or rales.   Abdominal:      General: Bowel sounds are normal. There is no distension.      Palpations: Abdomen is soft.      Tenderness: There is no abdominal tenderness. There is no rebound.   Skin:     General: Skin is warm and dry.      Coloration: Skin is not jaundiced.   Neurological:      General: No focal deficit present.      Mental Status: She is alert and oriented to person, place, and time. Mental status is at baseline.   Psychiatric:         Mood and Affect: Mood normal.         Behavior: Behavior normal.            Assessment and Plan     1. Viral upper respiratory tract infection  -     promethazine-dextromethorphan (PROMETHAZINE-DM) 6.25-15 mg/5 mL Syrp; Take 5 mLs by mouth every 4 (four) hours as needed (cough).  Dispense: 118 mL; Refill: 0    Advised patient to take Allegra or Zyrtec and Flonase  Will prescribe Promethazine-DM for cough           No follow-ups on file.

## 2023-12-29 ENCOUNTER — TELEPHONE (OUTPATIENT)
Dept: PRIMARY CARE CLINIC | Facility: CLINIC | Age: 33
End: 2023-12-29
Payer: COMMERCIAL

## 2023-12-29 ENCOUNTER — LAB VISIT (OUTPATIENT)
Dept: LAB | Facility: HOSPITAL | Age: 33
End: 2023-12-29
Payer: COMMERCIAL

## 2023-12-29 ENCOUNTER — OFFICE VISIT (OUTPATIENT)
Dept: PRIMARY CARE CLINIC | Facility: CLINIC | Age: 33
End: 2023-12-29
Payer: COMMERCIAL

## 2023-12-29 ENCOUNTER — PATIENT MESSAGE (OUTPATIENT)
Dept: PRIMARY CARE CLINIC | Facility: CLINIC | Age: 33
End: 2023-12-29

## 2023-12-29 VITALS
DIASTOLIC BLOOD PRESSURE: 72 MMHG | HEIGHT: 61 IN | WEIGHT: 178.81 LBS | RESPIRATION RATE: 18 BRPM | BODY MASS INDEX: 33.76 KG/M2 | SYSTOLIC BLOOD PRESSURE: 108 MMHG | HEART RATE: 95 BPM | TEMPERATURE: 99 F | OXYGEN SATURATION: 98 %

## 2023-12-29 DIAGNOSIS — E66.9 CLASS 1 OBESITY WITH SERIOUS COMORBIDITY AND BODY MASS INDEX (BMI) OF 33.0 TO 33.9 IN ADULT, UNSPECIFIED OBESITY TYPE: ICD-10-CM

## 2023-12-29 DIAGNOSIS — E88.819 INSULIN RESISTANCE: ICD-10-CM

## 2023-12-29 DIAGNOSIS — E28.2 PCOS (POLYCYSTIC OVARIAN SYNDROME): ICD-10-CM

## 2023-12-29 DIAGNOSIS — Z87.19 HISTORY OF FATTY INFILTRATION OF LIVER: ICD-10-CM

## 2023-12-29 LAB
BACTERIA #/AREA URNS HPF: ABNORMAL /HPF
BILIRUB UR QL STRIP: NEGATIVE
CLARITY UR: CLEAR
COLOR UR: ABNORMAL
GLUCOSE UR QL STRIP: NEGATIVE
HGB UR QL STRIP: ABNORMAL
KETONES UR QL STRIP: NEGATIVE
LEUKOCYTE ESTERASE UR QL STRIP: NEGATIVE
MICROSCOPIC COMMENT: ABNORMAL
NITRITE UR QL STRIP: NEGATIVE
NON-SQ EPI CELLS #/AREA URNS HPF: 0 /HPF
PH UR STRIP: 6 [PH] (ref 5–8)
PROT UR QL STRIP: NEGATIVE
RBC #/AREA URNS HPF: 10 /HPF (ref 0–4)
SP GR UR STRIP: 1.02 (ref 1–1.03)
SQUAMOUS #/AREA URNS HPF: 1 /HPF
URN SPEC COLLECT METH UR: ABNORMAL
WBC #/AREA URNS HPF: 0 /HPF (ref 0–5)

## 2023-12-29 PROCEDURE — 1160F PR REVIEW ALL MEDS BY PRESCRIBER/CLIN PHARMACIST DOCUMENTED: ICD-10-PCS | Mod: CPTII,S$GLB,, | Performed by: FAMILY MEDICINE

## 2023-12-29 PROCEDURE — 3008F BODY MASS INDEX DOCD: CPT | Mod: CPTII,S$GLB,, | Performed by: FAMILY MEDICINE

## 2023-12-29 PROCEDURE — 99999 PR PBB SHADOW E&M-EST. PATIENT-LVL V: CPT | Mod: PBBFAC,,, | Performed by: FAMILY MEDICINE

## 2023-12-29 PROCEDURE — 99999 PR PBB SHADOW E&M-EST. PATIENT-LVL V: ICD-10-PCS | Mod: PBBFAC,,, | Performed by: FAMILY MEDICINE

## 2023-12-29 PROCEDURE — 99214 PR OFFICE/OUTPT VISIT, EST, LEVL IV, 30-39 MIN: ICD-10-PCS | Mod: S$GLB,,, | Performed by: FAMILY MEDICINE

## 2023-12-29 PROCEDURE — 81000 URINALYSIS NONAUTO W/SCOPE: CPT | Performed by: FAMILY MEDICINE

## 2023-12-29 PROCEDURE — 1159F MED LIST DOCD IN RCRD: CPT | Mod: CPTII,S$GLB,, | Performed by: FAMILY MEDICINE

## 2023-12-29 PROCEDURE — 3044F PR MOST RECENT HEMOGLOBIN A1C LEVEL <7.0%: ICD-10-PCS | Mod: CPTII,S$GLB,, | Performed by: FAMILY MEDICINE

## 2023-12-29 PROCEDURE — 3074F PR MOST RECENT SYSTOLIC BLOOD PRESSURE < 130 MM HG: ICD-10-PCS | Mod: CPTII,S$GLB,, | Performed by: FAMILY MEDICINE

## 2023-12-29 PROCEDURE — 99214 OFFICE O/P EST MOD 30 MIN: CPT | Mod: S$GLB,,, | Performed by: FAMILY MEDICINE

## 2023-12-29 PROCEDURE — 3074F SYST BP LT 130 MM HG: CPT | Mod: CPTII,S$GLB,, | Performed by: FAMILY MEDICINE

## 2023-12-29 PROCEDURE — 3078F PR MOST RECENT DIASTOLIC BLOOD PRESSURE < 80 MM HG: ICD-10-PCS | Mod: CPTII,S$GLB,, | Performed by: FAMILY MEDICINE

## 2023-12-29 PROCEDURE — 3008F PR BODY MASS INDEX (BMI) DOCUMENTED: ICD-10-PCS | Mod: CPTII,S$GLB,, | Performed by: FAMILY MEDICINE

## 2023-12-29 PROCEDURE — 3078F DIAST BP <80 MM HG: CPT | Mod: CPTII,S$GLB,, | Performed by: FAMILY MEDICINE

## 2023-12-29 PROCEDURE — 3044F HG A1C LEVEL LT 7.0%: CPT | Mod: CPTII,S$GLB,, | Performed by: FAMILY MEDICINE

## 2023-12-29 PROCEDURE — 1159F PR MEDICATION LIST DOCUMENTED IN MEDICAL RECORD: ICD-10-PCS | Mod: CPTII,S$GLB,, | Performed by: FAMILY MEDICINE

## 2023-12-29 PROCEDURE — 1160F RVW MEDS BY RX/DR IN RCRD: CPT | Mod: CPTII,S$GLB,, | Performed by: FAMILY MEDICINE

## 2023-12-29 RX ORDER — SEMAGLUTIDE 0.25 MG/.5ML
0.25 INJECTION, SOLUTION SUBCUTANEOUS
Qty: 2 ML | Refills: 1 | Status: SHIPPED | OUTPATIENT
Start: 2023-12-29

## 2023-12-29 NOTE — PROGRESS NOTES
Subjective   A1c 5.4  Tsh within normal limits  Insulin resistance IR testing Dr Pittman  NO recent lipids        Patient ID: Karlee Guevara is a 33 y.o. female.    Chief Complaint: Establish Care (Weight loss)    Pt has hx of IR. She is taking metformin but inconsistently due to gi upset.   Had diarrhea.    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     Saw Dr. Saldivar epigastric pain nad then Dr. Renee. Pt still has gallbladder.  She and  cannot recall details of this; no imaging available for review.  R flank pain with positional change. She does not recall and fcnv. No renal stones.   Is not post prandial. No injuries.     She has no hematuria but no significant dysuria.      Food recall:  Bfast: egg, honey, bread, tomatoes, cheese  Lunch: skips  Dinner: meat, rice, likes veggies  Snack: chocolate  Water:sparkling water  Sugar Sweetened beverages: rare  Etoh: none    Sometimes eating late  Cooks at home, med style foods    HPI       Objective     PAST MEDICAL HISTORY:  Past Medical History:   Diagnosis Date    Dysmetabolic syndrome X 2021 2:40:12 PM    [facility] [employer] Historical - Endocrine/Metabolic/Immune: Insulin resistance-No Additional Notes    Dysmetabolic syndrome X 2021 2:40:12 PM    [facility]/Metabolic/Immune: Insulin resistance-No Additional Notes    GERD (gastroesophageal reflux disease)     Greater trochanteric bursitis of both hips 2018    H. pylori infection     Insulin resistance     PCOS (polycystic ovarian syndrome)     Polycystic ovaries 2020 11:18:08 AM    [facility] Historical - DO NOT USE: PCOS (polycystic ovarian syndrome)-oligomenorrhea, elevated DHEAS    Polycystic ovaries 2020 11:18:08 AM    [facility] Historical - DO NOT USE: PCOS (polycystic ovarian syndrome)-oligomenorrhea, elevated DHEAS    Postpartum depression          PAST SURGICAL HISTORY:  Past Surgical History:   Procedure Laterality Date     SECTION       INTRAUTERINE DEVICE INSERTION         FAMILY HISTORY:  History reviewed. No pertinent family history.       SOCIAL HISTORY:  Social History     Social History Narrative    Not on file       MEDICATIONS:  Medications have been reviewed.    ALLERGIES:  Allergies have been reviewed.    Vitals:    12/29/23 1458   BP: 108/72   Pulse: 95   Resp: 18   Temp: 98.8 °F (37.1 °C)     Wt Readings from Last 10 Encounters:   12/29/23 81.1 kg (178 lb 12.7 oz)   12/11/23 80.5 kg (177 lb 7.5 oz)   11/02/23 80.7 kg (178 lb)   10/09/23 81 kg (178 lb 9.2 oz)   08/07/23 79.4 kg (175 lb)   05/04/23 78.5 kg (173 lb)   11/25/22 77.7 kg (171 lb 4.8 oz)   09/08/22 77.1 kg (170 lb)   07/26/22 75.3 kg (166 lb)   07/19/22 76 kg (167 lb 8.8 oz)       Lab Results   Component Value Date    WBC 9.50 10/09/2023    HGB 13.1 10/09/2023    HCT 40.9 10/09/2023     10/09/2023    CHOL 200 (H) 01/07/2019    TRIG 267 (H) 01/07/2019    HDL 41 01/07/2019    ALT 18 10/09/2023    AST 16 10/09/2023     10/09/2023    K 4.2 10/09/2023     10/09/2023    CREATININE 0.7 10/09/2023    BUN 11 10/09/2023    CO2 18 (L) 10/09/2023    TSH 1.496 10/09/2023    HGBA1C 5.4 10/09/2023       Review of Systems   Constitutional:  Negative for activity change, appetite change, fatigue and fever.   HENT:  Negative for mouth dryness and goiter.    Eyes:  Negative for visual disturbance.   Respiratory:  Negative for apnea, cough, chest tightness and shortness of breath.    Cardiovascular:  Negative for chest pain, palpitations and leg swelling.   Gastrointestinal:  Negative for abdominal pain, constipation, diarrhea, nausea, vomiting and reflux.   Endocrine: Negative for cold intolerance, heat intolerance, polydipsia, polyphagia and polyuria.   Genitourinary:  Negative for frequency and menstrual problem.   Musculoskeletal:  Negative for arthralgias and myalgias.   Integumentary:  Negative for color change and rash.   Neurological:  Negative for vertigo, tremors,  seizures, syncope, weakness and numbness.   Psychiatric/Behavioral:  Negative for self-injury, sleep disturbance and suicidal ideas. The patient is not nervous/anxious.        Physical Exam  Vitals and nursing note reviewed.   Constitutional:       General: She is not in acute distress.  HENT:      Head: Normocephalic and atraumatic.      Mouth/Throat:      Pharynx: Oropharynx is clear.   Eyes:      General: No scleral icterus.     Pupils: Pupils are equal, round, and reactive to light.   Neck:      Comments: No TM  Cardiovascular:      Rate and Rhythm: Normal rate and regular rhythm.      Pulses: Normal pulses.      Heart sounds: Normal heart sounds. No murmur heard.     No friction rub. No gallop.   Pulmonary:      Effort: Pulmonary effort is normal. No respiratory distress.      Breath sounds: Normal breath sounds. No wheezing, rhonchi or rales.   Abdominal:      General: Bowel sounds are normal. There is no distension.      Palpations: Abdomen is soft.      Tenderness: There is no abdominal tenderness.   Musculoskeletal:         General: No swelling.      Cervical back: Normal range of motion and neck supple. No tenderness.   Lymphadenopathy:      Cervical: No cervical adenopathy.   Skin:     General: Skin is warm.      Findings: No erythema or rash.   Neurological:      Mental Status: She is alert and oriented to person, place, and time.   Psychiatric:         Mood and Affect: Mood normal.         Behavior: Behavior normal.              Assessment and Plan     1. History of fatty infiltration of liver  -     US Abdomen Complete; Future; Expected date: 12/29/2023  -     Urinalysis; Future; Expected date: 12/29/2023  -     Ambulatory Referral/Consult to Lifestyle Nutrition; Future; Expected date: 01/05/2024  -     semaglutide, weight loss, (WEGOVY) 0.25 mg/0.5 mL PnIj; Inject 0.25 mg into the skin every 7 days.  Dispense: 2 mL; Refill: 1    2. BMI 33.0-33.9,adult  -     US Abdomen Complete; Future; Expected date:  12/29/2023  -     Ambulatory Referral/Consult to Lifestyle Nutrition; Future; Expected date: 01/05/2024  -     semaglutide, weight loss, (WEGOVY) 0.25 mg/0.5 mL PnIj; Inject 0.25 mg into the skin every 7 days.  Dispense: 2 mL; Refill: 1    3. Insulin resistance  -     US Abdomen Complete; Future; Expected date: 12/29/2023  -     Urinalysis; Future; Expected date: 12/29/2023  -     Ambulatory Referral/Consult to Lifestyle Nutrition; Future; Expected date: 01/05/2024  -     semaglutide, weight loss, (WEGOVY) 0.25 mg/0.5 mL PnIj; Inject 0.25 mg into the skin every 7 days.  Dispense: 2 mL; Refill: 1    4. PCOS (polycystic ovarian syndrome)  -     semaglutide, weight loss, (WEGOVY) 0.25 mg/0.5 mL PnIj; Inject 0.25 mg into the skin every 7 days.  Dispense: 2 mL; Refill: 1    5. Class 1 obesity with serious comorbidity and body mass index (BMI) of 33.0 to 33.9 in adult, unspecified obesity type  -     semaglutide, weight loss, (WEGOVY) 0.25 mg/0.5 mL PnIj; Inject 0.25 mg into the skin every 7 days.  Dispense: 2 mL; Refill: 1        Also reviewed with pt general diet principles       Follow up in about 8 weeks (around 2/23/2024), or if symptoms worsen or fail to improve.  Risks/benefits/common side effects of medication discussed with patient at length. UTD patient handout given. (sherrill [email or web address])  D/W pt at length potential pharmacologic options; she desires consideration of ozempic. Risks/benefits/common side effects of ozempic d/w pt including nausea and pain at injection site; she is aware should not get pregnant while taking and not approved while breastfeeding. NO personal/fam hx of MEN or medullary thyroid cancer. No hx of pancreatitis. Instructed pt how to use with demo pen; pt practiced in office. Pt advised if approved will get pt handout from pharmacy. Pt has no hx of thyroid nodules or biliary disease/gallstones. DW pt with substantial or rapid weight loss gallstones could develop. Also dw pt glp-1 MOA and common  side effects.     Pt advised to not get pregnant; should monitor for any mh changes and notify provider  Also reviewed with her gastroparesis  Shot demo done with pt

## 2023-12-29 NOTE — PATIENT INSTRUCTIONS
"Guera zero  Heaven's  Human    Try adding nuts to chocolate.    Goal: < 25 grams added sugar daily  Protein: 25-30 grams per meal.    Please let me know what you hear about the medication               PRODUCE  [] All fresh fruit   [] All fresh vegetables   [] All fresh herbs  [] All herb purees + pastes  [] Pre-spiralized vegetable noodles   [] Steam-In-The-Bag begetables  [] Riced cauliflower  [] Jicama sticks  [] Love Beets  all varieties  [] Wholly Guacamole  all varieties  [] Hummus  all varieties, chickpea + vegetable  [] Tofu Shirataki noodles    [] Tofu  all varieties  [] Tempeh  all varieties    PROTEIN  CHICKEN   [] Boneless, skinless breasts  [] Boneless, skinless thighs  [] Ground chicken breast, at least 93% lean  [] Chicken breast cutlet  [] Aidell's  Chicken Sausage + Chicken Meatballs    TURKEY   [] Turkey breast tenderloin   [] Ground turkey breast, at least 93% lean  [] [place] Naturals  Turkey Sausage    BEEF  [] Tenderloin  [] Sirloin  [] Top Loin  [] Flank Steak  [] Round Steak  [] Filet  [] Lean ground beef, at least 93% lean + grass-fed preferable    PORK  [] Tenderloin  [] Pork Chop  [] Center Cut  [] [place] Naturals  No-Sugar Campos    BISON  [] Foley  90 - 95% lean    SEAFOOD  [] All fresh fish + seafood; locally sourced when possible  [] Smoked salmon    HEAT + EAT ENTREES   [] Aristides's Natural Foods  Chicken, Pork, Beef  [] Kushal  "All Natural" Grilled Chicken Breast + Strips, all varieties    SAUCES SPREADS + DIPS  [] Bitchin Sauce  Original, Chipotle, Cilantro Buhler  [] Nile's Kitchen  Tzatziki Yogurt Dip, Babaganoush, Hummus  [] Wholly Guacamole  all varieties  [] Hummus  all varieties  [] [place] Gringo Salsa  all varieties  [] Mrs. Jamie's Salsa  all varieties  [] Stubb's All Natural BBQ Sauce  [] Primal Kitchen  Walter, Ketchup, BBQ Sauce  [] Primal Kitchen Pasta Sauce  Roasted Garlic, Tomato Basil, no-dairy Vodka Sauce  [] Sal & Dari's  HeartSmart Pasta " Sauce    DAIRY/DAIRY SUBSTITUTES/EGGS  EGGS   [] All eggs  cage-free, pasture-raise preferable  [] Crepini  egg wraps  [] Vital Farms  Pasture-Raised Egg Bites  [] JUST Egg [vegan]     CREAMERS   [] Califia  Better Half, original + vanilla unsweetened  [] NutPods  all varieties    MILK   [] Horizon Organic  all varieties except chocolate  [] Organic Valley  all varieties except chocolate  [] Organic Valley  ultra-filtered, reduced fat milk     PLANT_BASED MILK ALTERNATIVES  [] All unsweetened almond milks  original, vanilla + chocolate  [] Ripple  unsweetened   [] Milkadamia  original +_ vanilla, unsweetened   [] Forager  original + vanilla, unsweetened   [] Silk Organic  soy milk, unsweetened  [] Oatly  unsweetened  [] Califia  regular + protein-fortified oat milk, unsweetened     CHEESES  [] Regular or reduced fat cheeses  [] BelGioso  Fresh Mozzarella Snack Packs, Parmesan Power-full Snack   [] Goat cheese  [] Fresh mozzarella  [] String cheese  all varieties  [] Yohana Cottage Cheese  [] Kyra's Cultured Cottage Cheese  [] Blanca Life 'Just Like Mozzarella'  plant-based shreds and other varieties  [] Parmela Creamery  plant-based shredded cheese    YOGURT  [] Fage  2% low-fat, plain  [] Siggi's  plain, vanilla  [] Chobani Greek  nonfat + whole milk yogurt, plain   [] Chobani Less Sugar  all flavored varieties   [] Oikos Greek  nonfat, plain  [] Two Good  all varieties   [] [place]ian Provisions  plain  [] [place] Organic  low-fat + nonfat, plain  [] [facility]  goat milk yogurt, plain  [] Kefit  unsweetened, plain  [] Forager  Greek style unsweetened, plain [dairy-free]  [] [place]  unsweetened Greek style, plain [dairy-free]  [] [facility]  almond milk yogurt, vanilla or plain, unsweetened [dairy-free]    FREEZER SECTION  FROZEN VEGGIES  [] All plain frozen veggies + greens [e.g. broccoli, brussels, carrots, okra, mushrooms, zucchini, yellow squash, butternut squash, kale,  spinach, rasta greens]  [] Riced veggies [e.g. cauliflower, broccoli, butternut squash]  [] Edamame  all varieties  [] Green Giant  [] Veggie Spirals  [] Marinated Veggies [e.g. eggplant, peppers, zucchini]  [] Simply Steam [place] Sprouts  [] Birds Eye  [] Power Blend Italian Style  lentils, broccoli, kale, zucchini  []  [place] Sprouts & Carrots  [] Oven Roasters Broccoli & Cauliflower  [] [place] Blend  [] Tattooed   [] Green Bean Blend  [] Farmer's Market Ratatouille  [] Butter Balsamic Glazed Vegetables  [] Riced Cauliflower & Quinoa Mediterranean Style  [] Elsi's Good Life  Southern Style Greens [sauteed kale + onion]    FROZEN FRUITS  [] All unsweetened frozen fruits  all varieties  [] Dole Fruits & Veggie Blends  Berries 'n Kale  [] Dole Mix-ins  Triple Berry     FROZEN ENTREES  [] The Good Kitchen meals  all varieties [ e.g. Chili Lime Chicken Over Riced Cauliflower]  [] Premium Paleo  Not Soy Momma's Meatloaf  [] Primal Kitchen  Chicken Pesto + Steak Fajitas w/ Peppers & Onions  [] Eating Well Frozen Entrees  Butter Chicken Masala, Steak Carne Asada, Creamy Pesto Chicken, Chicken + Wild Rice Stroganoff, Yellow Enriquez Chicken, Sun-dried Tomato Chicken, Chicken Lo Mein  [] Realgood Entree Bowls  Malawian Inspired Beef Bowl over Riced Cauliflower, Chicken Burrito Bowl   [] Great Karma Coconut Enriquez  [] Latha's  Tamale Greer with Black Beans, Vegetable Lasagna  [] Kashi Mayan [place] Bake  [] Healthy Choice  Simply Steamers Chicken Fried Rice  [] Basil Pesto Chicken & Armenian Style Pork Power Bowls  [] Tattooed   Enchilada Bowl  [] Venancio Farms  Spicy Black Bean Burgers    FROZEN PIZZAS  [] Cauli'flour Foods  Cauliflower Pizza Crusts  [] Outer Aisle  Cauliflower Crust  [] Latha's  Veggie Crust Cheese Pizza  [] Quest Pizza     VEGETARIAN PRODUCTS  [] Beyond Meat  ground 'meat' + grilled 'chicken' strips  [] Tofurkey  Original Italian Sausage + Original Tempeh  []  Gardein  Beefless Ground + Meatless Meatballs  [] [employer] Grillers  Original Burger, Crumbles, Meatballs    ICE CREAMS + FROZEN DESSERTS  [] Halo Top  regular + keto series, pops  [] Rebel  ice cream + dessert sandwiches  [] Enlightened  ice cream + bars  [] Nightfood  ice cream  [] Realgood  ice cream  [] Arctic Zero Fit  frozen pint  [] The Frozen Farmer  sorbets  [] Wholly Rollies  Protein Balls, all varieties  [] Dream Pops  Coconut Latte    FROZEN BREAKFASTS  [] Realgood  Breakfast Sandwiches on Cauliflower Cheesy Bread  [] Rebel  ice cream + dessert sandwiches  [] Enlightened  ice cream + bars  [] Nightfood  ice cream  [] Realgood  ice cream  [] Arctic Zero Fit  frozen pint  [] The Frozen Farmer  sorbets  [] Wholly Rollies  Protein Balls, all varieties  [] Dream Pops  Coconut Latte    BREADS/BUNS/WRAPS  [] Tejinder Bread: All Types - In Freezer Section   [] Flat Out Light Wraps - All Varieties   [] Flat Out Protein Up Carb Down Flat Bread   [] Kontos Whole Wheat Pocket Marj   [] Alok GILDA 100% Whole Wheat Tortillas   [] LaTortilla Factory Tortillas - Smart & Delicious; 50 or 80-calorie   [] Nature's Own 100% Whole Wheat Bread   [] Orowheat Healthful - 100% Whole Wheat Slice Bread and [place] Thins   [] Orowheat Healthful - Whole Wheat Nuts & Grain Bread; Flax & Seed Bread   [] Pepperidge Farm Natural Whole Grain 15 Bread   [] Pepperidge Farm Natural Whole Grain English Muffin - 100% Whole Wheat   [] [place] Farm Very Thin 100% Whole Wheat   [] Catrina Spear 45 Calories and Delightful   [] Keshav' 100% Whole Wheat Thin-Sliced Bagels and English Muffins   [] Western Bagel: Perfect 10     GLUTEN FREE  [] Leroy's Gluten Free Bread   [] [place] Bakehouse 7-Grain Gluten Free Bread     LEGUME PASTA   [] Explore Asian Organic Black Bean Spaghetti   [] Modern Table   [] Tolerant Foods       NUT BUTTERS & JELLIES    NUT BUTTERS   [] Better'n Chocolate: Coconut Chocolate Peanut Butter Spread   []  Better'n Peanut Butter - All Types   [] Earth Balance Coconut and Peanut Spread   [] Poli's Nut [place]   [] MaraNatha: All Natural Roasted Cashew Butter - [place] or Creamy   [] MaraNatha: Roasted Peanut Butter   [] Nuts 'N More Peanut [place] - All Flavors   [] PB2 Powder - Original or Chocolate   [] Skippy Natural - Creamy, Super Chunk   [] Smart Balance Peanut Butter - [place] or Creamy   [] Peanut Butter & Company:   [] Smooth , Crunch Time, The Heat Is On, Old Fashioned Smooth, Mighty Nut- Powdered Peanut Butter, Squeeze Pack   [] Smucker's Natural Peanut Butter - [place] or Creamy   [] Sunbutter Nut Butter   [] Wild Friends Protein Peanut Butter/Alpine o Butter - Vanilla or Chocolate     JELLIES  o Polaner's All Fruit   o Clearly Organic Best Choice: Strawberry Fruit Spread       SNACKS    BARS  [] Kashi Bars - Chewy or Crunchy; Honey Alpine o Flax or Peanut Butter   [] KIND Bars - 5 Grams of Sugar or Less   [] KIND Protein Bars - Strong and KIND   [] Nature Valley Protein Bar - All Varieties   [] Nature Valley Roasted Nut Crunch - Alpine Crunch; Peanut Crunch   [] [facility] Simple Nut Bar - Roasted Peanut & Honey   [] [facility] Valley Simple Nut Bar - Alpine, Cashew & Sea Salt   [] [facility] Nut [place] Bar - Salted Caramel Peanut   [] Think Thin Protein Bars   [] Quest Bars, Power Crunch Bars, Pure Protein Bars     BEEF JERKY - NITRATE FREE   [] Game On   [] Grass Run Farms   [] Krave   [] Ostrim   [] Perky Jerky   [] Primal Strips Meatless Vegan Jerky   [] [place]     CHIPS   [] Beanitos Chips   [] Fruit Crisps - e.g. Brother's-All-Natural, Bare Fruit, Yoga Chips   [] Ngoc's Soy Crisps: 1.3 ounce bag   [] Quest Protein Chips   [] Wasa Whole Wheat Crisp Bread     CRACKERS  [] Raysa's Gone Crackers   [] Nabisco Triscuit: Regular and Thin Crisp Crackers   [] Vans Say Cheese Crackers (G-F)     POPCORN/NUTS   [] Noe Chamorro's Smart Pop Popcorn - Single Serving   [] 100-Calorie Pack of Nuts -  All Varieties     PROTEIN POWDERS & DRINKS  []  Protein -  Whey Protein Powder   [] Garden of Life Raw Protein Powder   [] Iconic Ready-To-Drink Protein Drink   [] Traylor One Protein Powder   [] VegaSport Protein Powder     SALSA/HUMMUS/DIPS   [] Eat Well Embrace Life: Kennaeboni Alex Carrot o Hummus   [] Pre-Portioned Guacamole Packs   [] Nancy's   [] Tostitos Restaurant Style Salsa       SOUPS   [] Latha's Organic Soups - Lentil, Vegetable, Split Pea, Low-Sodium     CANNED GOODS   [] 100% Pure Pumpkin   [] BlueRunner Creole Cream-Style Red Beans or Navy Beans   [] Cajun Power Chicken Gumbo Base   [] Chicken of the Sea [place] Garvin   [] Denis Fresh Cut Sliced Beets   [] Hormel Breast of Chicken in Water   [] LeSuer Tender Baby Whole Carrots   [] Jairolluke Tabasco Helm Starter   [] Jesseist: Chunk Lite Tuna in Water, Gourmet Select Pouches   [] StarKist: Yellowfin Tuna Fillets   [] Trappey's: Kidney, Butter, Randall, Black Eye, Field, and Black Beans   [] TIANNA Lion's Turnip Greens or Chalo Spinach     CONDIMENTS/ SAUCES/SPREADS/ SPICES  [] Mikey Simental's Magic Seasonings - Regular or Salt Free   [] Nico Underwood's Sauces - All Flavors   [] Laughing Cow Light - All Flavors   [] Dash Salt-Free Marinade - All Flavors   [] Stephen & Dari's: Heart Smart Pasta Sauces   [] Tabasco     SALAD DRESSINGS  [] Inessa's Naturals: Lite Honey Mustard   [] Segovia's Own: Lighten Up Salad Dressing - All Varieties   [] OPA Greek Yogurt Dressings - Ranch, Blue o Cheese, Caesar, Feta Dill     SWEETENERS  [] Sweet Bock Sweetener   [] Swerve   [] Truvia     BEVERAGES  [] Coconut Water   [] Crystal Light PURE - All Flavors   [] Honest Tea: Just Green Tea, Unsweetened   [] Kombucha Tea   [] [place]   [] [place] Sisters Bloody Raysa Mix   [] Metromint - Zero-Sugar; All Natural Flavored   [] Boris - Plain or Flavored   [] Mariel Tomas   [] Steaz - Zero-Sugar, All-Natural, Sparkling Tea   [] Tea Bags: Any Brand - e.g.  Sunshine, Yogi, Tazzo, Celestial   [] V8 100% Vegetable Juice   [] Vitamin Water Zero   [] Water   [] Zevia - Stevia Sweetened Soft Drink     BEER/MONALISA/LIQUORS  []Green's Premier Light 64 Calories   [] Bud Select - 55 Calories   [] [facility] Bloody Raysa Mix   [] Preston Genuine Draft - 64 Calories   [] Red or White Wine - All Varieties     CEREALS: HOT/COLD   [] Dunia'jessica Jackson's Original Cereal  [] Wanda's Mill Oat Bran Hot Cereal - Cracked Wheat, Multi-Grain  [] Kashi GoLean Cereal  [] Kashi GoLean Hot Cereal packets - Vanilla; Honey Cinnamon  [] Neville's Special K Protein Cereal  [] Erik's Steel Cut Brittany Oatmeal  [] Nature's Path Smart Bran  [] Adventism Instant Oatmeal packet, Original  [] Adventism Old Fashioned Adventism Oats  [] Uncle Paul's Whole Wheat & Flaxseed Original Cereal

## 2024-01-08 ENCOUNTER — PATIENT MESSAGE (OUTPATIENT)
Dept: PRIMARY CARE CLINIC | Facility: CLINIC | Age: 34
End: 2024-01-08
Payer: COMMERCIAL

## 2024-01-22 ENCOUNTER — HOSPITAL ENCOUNTER (OUTPATIENT)
Dept: RADIOLOGY | Facility: HOSPITAL | Age: 34
Discharge: HOME | End: 2024-01-22
Attending: FAMILY MEDICINE
Payer: COMMERCIAL

## 2024-01-22 PROCEDURE — 76700 US EXAM ABDOM COMPLETE: CPT | Mod: 26,,, | Performed by: RADIOLOGY

## 2024-01-22 PROCEDURE — 76700 US EXAM ABDOM COMPLETE: CPT | Mod: TC

## 2024-02-07 ENCOUNTER — PATIENT MESSAGE (OUTPATIENT)
Dept: SURGERY | Facility: CLINIC | Age: 34
End: 2024-02-07
Payer: COMMERCIAL

## 2024-06-06 ENCOUNTER — OFFICE VISIT (OUTPATIENT)
Dept: SURGERY | Facility: CLINIC | Age: 34
End: 2024-06-06
Payer: COMMERCIAL

## 2024-06-06 VITALS
BODY MASS INDEX: 32.05 KG/M2 | SYSTOLIC BLOOD PRESSURE: 99 MMHG | HEART RATE: 96 BPM | OXYGEN SATURATION: 97 % | DIASTOLIC BLOOD PRESSURE: 63 MMHG | HEIGHT: 61 IN | TEMPERATURE: 99 F | WEIGHT: 169.75 LBS

## 2024-06-06 DIAGNOSIS — K59.00 CONSTIPATION, UNSPECIFIED CONSTIPATION TYPE: ICD-10-CM

## 2024-06-06 DIAGNOSIS — K60.2 ANAL FISSURE: Primary | ICD-10-CM

## 2024-06-06 PROCEDURE — 3078F DIAST BP <80 MM HG: CPT | Mod: CPTII,S$GLB,, | Performed by: STUDENT IN AN ORGANIZED HEALTH CARE EDUCATION/TRAINING PROGRAM

## 2024-06-06 PROCEDURE — 99999 PR PBB SHADOW E&M-EST. PATIENT-LVL III: CPT | Mod: PBBFAC,,, | Performed by: STUDENT IN AN ORGANIZED HEALTH CARE EDUCATION/TRAINING PROGRAM

## 2024-06-06 PROCEDURE — 3008F BODY MASS INDEX DOCD: CPT | Mod: CPTII,S$GLB,, | Performed by: STUDENT IN AN ORGANIZED HEALTH CARE EDUCATION/TRAINING PROGRAM

## 2024-06-06 PROCEDURE — 1159F MED LIST DOCD IN RCRD: CPT | Mod: CPTII,S$GLB,, | Performed by: STUDENT IN AN ORGANIZED HEALTH CARE EDUCATION/TRAINING PROGRAM

## 2024-06-06 PROCEDURE — 99215 OFFICE O/P EST HI 40 MIN: CPT | Mod: S$GLB,,, | Performed by: STUDENT IN AN ORGANIZED HEALTH CARE EDUCATION/TRAINING PROGRAM

## 2024-06-06 PROCEDURE — 3074F SYST BP LT 130 MM HG: CPT | Mod: CPTII,S$GLB,, | Performed by: STUDENT IN AN ORGANIZED HEALTH CARE EDUCATION/TRAINING PROGRAM

## 2024-06-06 NOTE — PROGRESS NOTES
Subjective:       Patient ID: Karlee Guevara is a 34 y.o. female.    Chief Complaint: No chief complaint on file.    Pt is a 35 yo f who presents with similar complaints of rectal pain with bowel movements. She has been noncompliant with recommended bowel regimen and remains constipated with significant straining with BM. She did use topical diltiazem/lidocaine cream with initial relief, however she stopped it and got constipated with a hard BM and pain recurred. She does not want to consider surgical intervention          Objective:      Physical Exam  Constitutional:       Appearance: She is well-developed.   HENT:      Head: Normocephalic and atraumatic.   Eyes:      Conjunctiva/sclera: Conjunctivae normal.      Pupils: Pupils are equal, round, and reactive to light.   Neck:      Thyroid: No thyromegaly.   Cardiovascular:      Rate and Rhythm: Normal rate and regular rhythm.   Pulmonary:      Effort: Pulmonary effort is normal. No respiratory distress.   Abdominal:      General: There is no distension.      Palpations: Abdomen is soft. There is no mass.      Tenderness: There is no abdominal tenderness.   Genitourinary:     Comments: Offered but deferred  Musculoskeletal:         General: No tenderness. Normal range of motion.      Cervical back: Normal range of motion.   Skin:     General: Skin is warm and dry.      Capillary Refill: Capillary refill takes less than 2 seconds.   Neurological:      General: No focal deficit present.      Mental Status: She is alert and oriented to person, place, and time.         Assessment:       1. Anal fissure    2. Constipation, unspecified constipation type        Plan:       - severe constipation, recommended compliance with recommended bowel regimen of colace, fiber and miralax. She and  voiced understanding  - discussed role of surgery including botox injection vs LIS. She does not want to pursue surgical intervention at this time  - will refill  dilt/lido  - RTC in 1 month. If pain has not resolved need to consider surgical intervention    >45 minutes were spent on chart review as well as extensive face to face discussion with the patient including education on pathophysiology of disease and treatment recommendations          Angella Cabral MD  Colon and Rectal Surgery  [facility]

## 2024-06-07 ENCOUNTER — PATIENT MESSAGE (OUTPATIENT)
Dept: SPORTS MEDICINE | Facility: CLINIC | Age: 34
End: 2024-06-07
Payer: COMMERCIAL

## 2024-06-11 DIAGNOSIS — G89.29 CHRONIC PAIN OF LEFT ANKLE: Primary | ICD-10-CM

## 2024-06-11 DIAGNOSIS — M25.572 CHRONIC PAIN OF LEFT ANKLE: Primary | ICD-10-CM

## 2024-06-12 ENCOUNTER — OFFICE VISIT (OUTPATIENT)
Dept: SPORTS MEDICINE | Facility: CLINIC | Age: 34
End: 2024-06-12
Payer: COMMERCIAL

## 2024-06-12 ENCOUNTER — HOSPITAL ENCOUNTER (OUTPATIENT)
Dept: RADIOLOGY | Facility: HOSPITAL | Age: 34
Discharge: HOME | End: 2024-06-12
Attending: STUDENT IN AN ORGANIZED HEALTH CARE EDUCATION/TRAINING PROGRAM
Payer: COMMERCIAL

## 2024-06-12 VITALS — HEIGHT: 61 IN | BODY MASS INDEX: 32.05 KG/M2 | WEIGHT: 169.75 LBS

## 2024-06-12 DIAGNOSIS — S93.492A SPRAIN OF ANTERIOR TALOFIBULAR LIGAMENT OF LEFT ANKLE, INITIAL ENCOUNTER: Primary | ICD-10-CM

## 2024-06-12 DIAGNOSIS — M25.572 CHRONIC PAIN OF LEFT ANKLE: ICD-10-CM

## 2024-06-12 DIAGNOSIS — M25.373 CHRONIC INSTABILITY OF ANKLE: ICD-10-CM

## 2024-06-12 DIAGNOSIS — G89.29 CHRONIC PAIN OF LEFT ANKLE: ICD-10-CM

## 2024-06-12 PROCEDURE — 99203 OFFICE O/P NEW LOW 30 MIN: CPT | Mod: S$GLB,,, | Performed by: STUDENT IN AN ORGANIZED HEALTH CARE EDUCATION/TRAINING PROGRAM

## 2024-06-12 PROCEDURE — 3008F BODY MASS INDEX DOCD: CPT | Mod: CPTII,S$GLB,, | Performed by: STUDENT IN AN ORGANIZED HEALTH CARE EDUCATION/TRAINING PROGRAM

## 2024-06-12 PROCEDURE — 1159F MED LIST DOCD IN RCRD: CPT | Mod: CPTII,S$GLB,, | Performed by: STUDENT IN AN ORGANIZED HEALTH CARE EDUCATION/TRAINING PROGRAM

## 2024-06-12 PROCEDURE — 73630 X-RAY EXAM OF FOOT: CPT | Mod: 26,LT,, | Performed by: RADIOLOGY

## 2024-06-12 PROCEDURE — 73630 X-RAY EXAM OF FOOT: CPT | Mod: TC,LT

## 2024-06-12 PROCEDURE — 99999 PR PBB SHADOW E&M-EST. PATIENT-LVL III: CPT | Mod: PBBFAC,,, | Performed by: STUDENT IN AN ORGANIZED HEALTH CARE EDUCATION/TRAINING PROGRAM

## 2024-06-12 NOTE — PATIENT INSTRUCTIONS
Assessment:  Karlee Guevara is a 34 y.o. female   Chief Complaint   Patient presents with    Left Foot - Pain       Encounter Diagnosis   Name Primary?    Sprain of anterior talofibular ligament of left ankle, initial encounter Yes        Plan:  Reviewed your x-rays with you today and discussed pertinent findings.   No fractures  Ligaments stable    We have reviewed the natural history of this disorder and discussed treatment and management options moving forward   Ankle brace   PT, can try home exercises  At least 10 minutes were spent sizing, fitting, and educating regarding durable medical equipment today and all questions answered. This service was performed under direction of Barney Conde MD today.  CPT 55542.  At least 7 minutes were spent developing, teaching, and performing a home exercise program.  A written summary was provided and all questions were answered. This service was performed under direction of Barney Conde MD. CPT 85772-UW      Ankle Sprain- Home Exercise Program    The right exercises for you will depend on what kind of sprain you have and how severe it is. Ask your doctor which exercises you should do. Below are some examples of exercises that can be done as you recover:     Exercises to Improve Your Range of Motion (ROM)    Initially after an ankle sprain, you will have swelling that will limit your ability to move your ankle. This swelling is usually the primary cause of pain as well. As this swelling subsides, pain generally subsides as well. By moving your ankle, your muscles and other systems of the body aide in removing this swelling.     For these exercies, the focus will be to begin working on getting back to your normal range of motion for your ankle. You may fill some discomfort during these exercises, which is normal, but try to only move within a range that keeps your pain a 4/10 or below. This range should increase day by day, especially once a few days have passed  since your initial injury. You will need to perform these until your ankle can move without any pain or stiffness in a similar range to your uninjred ankle.             If you have been immobilized due to your injury, you will also need to make sure to stretch out your calf muscles, as your Achilles tendon can become tight while it is immobilized due to its lack of use (another reason to work on your range of motion several times a day). This can be done as seen below.           Exercises to Improve Your Strength    If you have to be immobolized or are unable to bear weight due to your injury, you may begin to lose strength in your lower leg due to the muscles not being utilized. It is important to try and combat this to to avoid prolonging your recovery. This initially can be done through isometric exercises which cause you to contract your muscle without moving your ankle. These exercises will primarily be utilized until you begin regaining your full range of motion.             As your range of motion improves, you will transition to exercises that work on your strength through your full ROM.       The first set of exercises are know as 4-way ankle. These exercises strengthen the ankle in the 4 primary directions that it moves:  Plantarflexion (pointing your foot/toes)  Dorsiflexion (pulling your foot/toes up)  Inversion (turning/rolling foot in)  Eversion (turning/rolling foot out)    These are usually done with resistance bands, which generally have varying resistances that are color based. As you get stronger you can increase your sets and reps to work up to 4 sets of 15 repetitions. If you are able to do 4 sets of 15 repetitions with ease, you may go up to the next level of resistance. Some directions may progress more quickly than others (generally: plantarflexion first, then dorsiflexion, then inversion, and then eversion but his may depend on your specific injury).                 As you are able to begin  bearing weight and walking again you can being to add the following exericses:               Calf Raise Progression      You may begin calf raises as soon as your are able to tolerate bearing weight on your injured ankle. You will begin with double leg (also know as bilateral) calf raises. Start by putting as much pressure on your injured foot as possible, and then the remaining on your injured foot (I.e. 25% on injured foot, and 75% on UN-injured foot). As you are able to tolerate more pressure on the injured foot, add more pressure to it (90% on injured foot, and 10% on the UN-injured foot).     One your are able to to hold all of your weight on your injured foot you can begin to transition to SL eccentric calf raises.    The final stage of the progression is to be able to perform a single leg calf raise on the injured leg.     This progression is demonstrated below. You may work up to 4 sets of 20 repetitions.                 Balance and Proprioception  The final component of the treatment is to focus on balance and proprioception.   Balance and Proprioception (ability to feel where the body is in space) is important to keep us upright and keep our joints in good position. The foot and lower leg have to be able to micro-corrections within a few hundredths of a second while running and playing sports, otherwise problems will show up somewhere. These mechanisms can also help prevent ankle sprains as well. Fortunately, improving balance is one of the quickest things we can do as it is more about neurological control rather than strength. Toe yoga will help keep the great toe on the ground which will give you better control. Good posture keeps weight on the forefoot so you can control side to side forces better. Standing on one leg multiple times throughout the day will improve balance quickly and even develop some endurance in leg and hip muscles.    To improve, practice many times a day. Find good posture, drive  the toe down to stabilize the arch, brace your core, and stand on one leg for 20 - 30 seconds, then the other. Advance from quiet standing, to gentle side to side rotation, to quiet standing with eyes closed, to rotation with eyes closed.                Once you are able to complete and also have become proficient at these exercises, It is time to begin transitioning to Phase 3 to work back into normal every day activities (or sport specific activities if you are an athlete).  This may require more specialized exercises to aid in this progression which is usually best performed by a physical therapist as they usually require help or advanced equipment. However, you should always check with your physician before progressing to this stage.      Follow-up: As needed or sooner if there are any problems between now and then.    Thank you for choosing [facility] and Dr. Barney Conde for your orthopedic & sports medicine care. It is our goal to provide you with exceptional care that will help keep you healthy, active, and get you back in the game.    Please do not hesitate to reach out to us via email, phone, or MyChart with any questions, concerns, or feedback.    If you felt that you received exemplary care today, please consider leaving us feedback on Healthgrades at:  [email or web address]    If you are experiencing pain/discomfort ,or have questions after 5pm and would like to be connected to the [facility] on-call team, please call this number and specify which Sports Medicine provider is treating you: [phone]

## 2024-06-12 NOTE — PROGRESS NOTES
Patient ID: Karlee Guevara  YOB: 1990  MRN: [id number]    Chief Complaint: Pain of the Left Foot    Referred By: Self for left foot     History of Present Illness: Karlee Guevara is a  34 y.o. female who presents today with left foot pain. Pt was offered video  today but refused this and requested family member on phone to interpret.  #[id number] was on the line and patient refused this.  Patient was able to communicate with mostly broken English with her  on the phone.  Karlee Guevara states it is Chronic in nature and there was not a specific mechanism.  Karlee Guevara describes the pain as a continuous dorsum foot. Current pain level at rest is 6/10 (Numeric Pain Rating Scale).  Associated symptoms include: Swelling No, Instability No, Pain that affects your sleep No, Mechanical No, locking/catching No, Neurological No, limited range of motion No. Aggravating activities include inversion and eversion, walking. They denies formal physical therapy for this.    Hemoglobin A1C   Date Value Ref Range Status   10/09/2023 5.4 4.0 - 5.6 % Final     Comment:     ADA Screening Guidelines:  5.7-6.4%  Consistent with prediabetes  >or=6.5%  Consistent with diabetes    High levels of fetal hemoglobin interfere with the HbA1C  assay. Heterozygous hemoglobin variants (HbS, HgC, etc)do  not significantly interfere with this assay.   However, presence of multiple variants may affect accuracy.     05/04/2023 5.40 4.3 - 6.5 % A1C Final     Comment:     Diabetic Goal: < 7.0%    According to ADA guidelines, a hemoglobin A1c level of 6.5%  or greater is diagnostic of diabetes mellitus.    *For healthy non-diabetic pregnant women,  upper limits of  normal are:  1st Trimester   5.4%  2nd Trimester   5.5%  3rd Trimester   5.8%    *Reference: Course of HbA1C in non-diabetic pregnancy  related to birth weight,  ELIA Amador,  DEVIN Pimentel; [place] Journal of Medicine;  January 2013, vol 71 no 1.       06/29/2017 5.2 4.0 - 5.6 % Final     Comment:     According to ADA guidelines, hemoglobin A1c <7.0% represents  optimal control in non-pregnant diabetic patients. Different  metrics may apply to specific patient populations.   Standards of Medical Care in Diabetes-2016.  For the purpose of screening for the presence of diabetes:  <5.7%     Consistent with the absence of diabetes  5.7-6.4%  Consistent with increasing risk for diabetes   (prediabetes)  >or=6.5%  Consistent with diabetes  Currently, no consensus exists for use of hemoglobin A1c  for diagnosis of diabetes for children.  This Hemoglobin A1c assay has significant interference with fetal   hemoglobin   (HbF). The results are invalid for patients with abnormal amounts of   HbF,   including those with known Hereditary Persistence   of Fetal Hemoglobin. Heterozygous hemoglobin variants (HbAS, HbAC,   HbAD, HbAE, HbA2) do not significantly interfere with this assay;   however, presence of multiple variants in a sample may impact the %   interference.         Past Medical History:   Past Medical History:   Diagnosis Date    Dysmetabolic syndrome X 2/1/2021 2:40:12 PM    [facility] - Endocrine/Metabolic/Immune: Insulin resistance-No Additional Notes    Dysmetabolic syndrome X 2/1/2021 2:40:12 PM    [facility] - Endocrine/Metabolic/Immune: Insulin resistance-No Additional Notes    GERD (gastroesophageal reflux disease)     Greater trochanteric bursitis of both hips 5/11/2018    H. pylori infection     Insulin resistance     PCOS (polycystic ovarian syndrome)     Polycystic ovaries 7/16/2020 11:18:08 AM    [facility] Historical - DO NOT USE: PCOS (polycystic ovarian syndrome)-oligomenorrhea, elevated DHEAS    Polycystic ovaries 7/16/2020 11:18:08 AM    [facility] Historical - DO NOT USE: PCOS (polycystic ovarian syndrome)-oligomenorrhea, elevated DHEAS     Postpartum depression      Past Surgical History:   Procedure Laterality Date     SECTION      INTRAUTERINE DEVICE INSERTION       No family history on file.  Social History     Socioeconomic History    Marital status:     Number of children: 1   Occupational History    Occupation: Unemployed   Tobacco Use    Smoking status: Never    Smokeless tobacco: Never   Substance and Sexual Activity    Alcohol use: Never    Drug use: No    Sexual activity: Yes     Partners: Male     Comment: postpartum     Medication List with Changes/Refills   Current Medications    DEXTROMETHORPHAN-GUAIFENESIN  MG (MUCINEX DM)  MG PER 12 HR TABLET    Take 1 tablet by mouth every 12 (twelve) hours.    DILTIAZEM HCL (DILTIAZEM 2% - LIDOCAINE 5% CREAM)    Apply 1 application  topically 3 (three) times daily.    DROSPIRENONE-ETHINYL ESTRADIOL (MEGAN) 3-0.03 MG PER TABLET    Take 1 tablet by mouth once daily.    HYDROCORTISONE-PRAMOXINE (PROCTOFOAM-HS) RECTAL FOAM    Place 1 applicator rectally 2 (two) times daily.    MAGNESIUM GLUCONATE 27.5 MG MAGNE- SIUM (500 MG) TAB    Take by mouth once.    MECLIZINE (ANTIVERT) 25 MG TABLET    Take 1 tablet (25 mg total) by mouth 3 (three) times daily as needed for Dizziness.    PRENATAL VIT 40/IRON/FOLIC/DHA (PRENATAL MULTI-DHA ORAL)    Take by mouth.    SEMAGLUTIDE, WEIGHT LOSS, (WEGOVY) 0.25 MG/0.5 ML PNIJ    Inject 0.25 mg into the skin every 7 days.     Review of patient's allergies indicates:  No Known Allergies    Physical Exam:   Body mass index is 32.07 kg/m².    GENERAL: Well appearing, in no acute distress.  HEAD: Normocephalic and atraumatic.  ENT: External ears and nose grossly normal.  EYES: EOMI bilaterally  PULMONARY: Respirations are grossly even and non-labored.  NEURO: Awake, alert, and oriented x 3.  SKIN: No obvious rashes appreciated.  PSYCH: Mood & affect are appropriate.    Detailed MSK exam:     Left foot: Full ankle and foot ROM. No pain with active  ROM. Pain with inversion and eversion passive along dorsum foot.  No tenderness to palpation. Bump negative. Ligamentous stable.     Imaging:  X-Ray Foot Complete 3 view Left  Narrative: EXAMINATION:  XR FOOT COMPLETE 3 VIEW LEFT    CLINICAL HISTORY:  .  Pain in left ankle and joints of left foot    TECHNIQUE:  AP, lateral and oblique views of the left foot were performed.    COMPARISON:  None    FINDINGS:  No acute osseous or soft tissue abnormality.  Tiny dorsal calcaneal enthesophyte.  Impression: As above    Electronically signed by: Marcelo Natarajan MD  Date:    06/12/2024  Time:    15:54      Relevant imaging results were reviewed and interpreted by me and per my read as above.  This was discussed with the patient and / or family today.     Assessment:  Karlee Guevara is a 34 y.o. female presents today with unclear mechanism or injury but most pain with inversion eversion motions of the ankle extending down the lateral aspect of the foot.  X-rays reviewed show no gross bony abnormalities.  Discussed likely having chronic ankle instability resulting in her foot and ankle pain.  Discussed lace-up ankle brace and formal PT is and she deferred the ankle brace after being brought in.  I reassured her that there foot x-rays were normal and discussed at this time that she can start some formal therapy in order was placed today.  Follow-up with me as needed in the future.    Sprain of anterior talofibular ligament of left ankle, initial encounter    Chronic instability of ankle         A copy of today's visit note has been sent to the referring provider.       Barney Conde MD    Disclaimer: This note was prepared using a voice recognition system and is likely to have sound alike errors within the text.

## 2024-09-13 NOTE — PROGRESS NOTES
Patient ID: Karlee Guevara is a 34 y.o. female.    Chief Complaint: Fever, URI, lab review (From ob/gyn), and Back Pain    : #[id number]  History of Present Illness    The patient presents with fever, cold symptoms, and generalized body pain, including back pain and abdominal discomfort.    Ms. Guevara reports feeling unwell for the past 2 days with fever and back pain, which have progressively worsened. She has been taking Advil and Motrin for symptom relief with minimal effectiveness in managing her pain.    The patient has longstanding back pain that has persisted for years, exacerbated by her current illness, particularly when recumbent. She has previously received injections and undergone various diagnostic tests including MRI scans, with no significant findings reported. Despite consulting multiple physicians, including pain management specialists, she has not received a definitive diagnosis or resolution of her symptoms.    In addition to back pain, the patient describes pain throughout her body, including her fingers, toes, and feet. The foot pain is particularly noticeable when ambulating or after extended periods of standing or dancing at social events.    The patient also reports abdominal pain. Years ago, a test revealed the presence of bacteria in her abdomen, for which she believes she received antibiotic treatment.    Lastly, the patient notes fatigue, although it is unclear if this is related to her current illness or a chronic condition.    The patient denies any recent heavy lifting or improvement in symptoms with OTC pain medications.    MRI: Previously, no significant findings related to back pain.      ROS:  General: complains of fever, denies chills, complains of fatigue, denies weight gain, denies weight loss  Eyes: denies vision changes, denies redness, denies discharge  ENT: denies ear pain, denies nasal congestion, denies sore throat  Cardiovascular: denies chest pain,  denies palpitations, denies lower extremity edema  Respiratory: denies cough, denies shortness of breath  Gastrointestinal: complains of abdominal pain, denies nausea, denies vomiting, denies diarrhea, denies constipation, denies blood in stool  Genitourinary: denies dysuria, denies hematuria, denies frequency  Musculoskeletal: denies joint pain, denies muscle pain, complains of back pain  Skin: denies rash, denies lesion  Neurological: denies headache, denies dizziness, denies numbness, denies tingling  Psychiatric: denies anxiety, denies depression, denies sleep difficulty            Physical Exam    General: In no acute distress.  Head: Normocephalic. Non traumatic.  Eyes: PERRLA. EOMs full. Conjunctivae clear. Fundi grossly normal.  Ears: EACs clear. TMs normal.  Nose: Mucosa pink. Mucosa moist. No obstruction.  Throat: Clear. No exudates. No lesions.  Neck: Supple. No masses. No thyromegaly. No bruits.  Chest: Lungs clear. No rales. No rhonchi. No wheezes.  Heart: RRR. No murmurs. No rubs. No gallops.  Abdomen: Soft. No tenderness. No masses. BS normal.  : Normal external genitalia. No lesions. No discharge. No hernias  noted.  Back: Normal curvature. No scoliosis. No tenderness.  Extremities: Warm. Well perfused. No upper extremity edema. No lower extremity edema. FROM. No deformities. No joint erythema.  Neuro: No focal deficits appreciated. Good muscle tone. Normal response to visual stimuli. Normal response to auditory stimuli.  Skin: Normal. No rashes. No lesions noted.            Current Outpatient Medications:     dextromethorphan-guaiFENesin  mg (MUCINEX DM)  mg per 12 hr tablet, Take 1 tablet by mouth every 12 (twelve) hours., Disp: , Rfl:     diltiazem HCl (DILTIAZEM 2% - LIDOCAINE 5% CREAM), Apply 1 application  topically 3 (three) times daily., Disp: 30 g, Rfl: 1    drospirenone-ethinyl estradioL (MEGAN) 3-0.03 mg per tablet, Take 1 tablet by mouth once daily., Disp: 30 tablet, Rfl:  "11    hydrocortisone-pramoxine (PROCTOFOAM-HS) rectal foam, Place 1 applicator rectally 2 (two) times daily., Disp: 30 g, Rfl: 5    magnesium gluconate 27.5 mg magne- sium (500 mg) Tab, Take by mouth once., Disp: , Rfl:     meclizine (ANTIVERT) 25 mg tablet, Take 1 tablet (25 mg total) by mouth 3 (three) times daily as needed for Dizziness., Disp: 90 tablet, Rfl: 0    prenatal vit 40/iron/folic/dha (PRENATAL MULTI-DHA ORAL), Take by mouth., Disp: , Rfl:     ergocalciferol (ERGOCALCIFEROL) 50,000 unit Cap, Take 1 capsule (50,000 Units total) by mouth every 7 days., Disp: 12 capsule, Rfl: 0    pantoprazole (PROTONIX) 40 MG tablet, Take 1 tablet (40 mg total) by mouth once daily., Disp: 90 tablet, Rfl: 3    Current Facility-Administered Medications:     copper intrauterine device 380 square mm  mm, 380 mm, Intrauterine, , Melvi Pittman MD, 380 mm at 07/26/22 1510            VITAL SIGNS:  Vitals:    09/16/24 0921   BP: 120/70   Pulse: 90   Resp: 18   Temp: (!) 100.9 °F (38.3 °C)   Weight: 78.3 kg (172 lb 9.9 oz)   Height: 5' 1" (1.549 m)       CURRENT BMI:   Body mass index is 32.62 kg/m².    LABS REVIEWED:    CBC:  Lab Results   Component Value Date    WBC 9.34 08/27/2024    RBC 4.96 08/27/2024    HGB 13.2 08/27/2024    HCT 40.1 08/27/2024    MCV 81 (L) 08/27/2024    MCH 26.6 (L) 08/27/2024    MCHC 32.9 08/27/2024    RDW 12.5 08/27/2024     08/27/2024    MPV 10.4 08/27/2024    GRAN 6.1 08/27/2024    GRAN 65.2 08/27/2024    LYMPH 2.8 08/27/2024    LYMPH 29.4 08/27/2024    MONO 0.4 08/27/2024    MONO 3.9 (L) 08/27/2024    EOS 0.1 08/27/2024    BASO 0.03 08/27/2024    EOSINOPHIL 1.0 08/27/2024    BASOPHIL 0.3 08/27/2024       CHEMISTRY:  Sodium   Date Value Ref Range Status   08/27/2024 139 136 - 145 mmol/L Final     Potassium   Date Value Ref Range Status   08/27/2024 4.0 3.5 - 5.1 mmol/L Final     Chloride   Date Value Ref Range Status   08/27/2024 107 95 - 110 mmol/L Final     CO2   Date Value Ref Range " Status   08/27/2024 24 23 - 29 mmol/L Final     Glucose   Date Value Ref Range Status   08/27/2024 116 (H) 70 - 110 mg/dL Final     BUN   Date Value Ref Range Status   08/27/2024 17 6 - 20 mg/dL Final     Creatinine   Date Value Ref Range Status   08/27/2024 0.7 0.5 - 1.4 mg/dL Final     Calcium   Date Value Ref Range Status   08/27/2024 9.1 8.7 - 10.5 mg/dL Final     Total Protein   Date Value Ref Range Status   08/27/2024 7.7 6.0 - 8.4 g/dL Final     Albumin   Date Value Ref Range Status   08/27/2024 4.3 3.9 - 4.9 g/dL Final   08/27/2024 3.6 3.5 - 5.2 g/dL Final     Total Bilirubin   Date Value Ref Range Status   08/27/2024 0.3 0.1 - 1.0 mg/dL Final     Comment:     For infants and newborns, interpretation of results should be based  on gestational age, weight and in agreement with clinical  observations.    Premature Infant recommended reference ranges:  Up to 24 hours.............<8.0 mg/dL  Up to 48 hours............<12.0 mg/dL  3-5 days..................<15.0 mg/dL  6-29 days.................<15.0 mg/dL       Alkaline Phosphatase   Date Value Ref Range Status   08/27/2024 53 (L) 55 - 135 U/L Final     AST   Date Value Ref Range Status   08/27/2024 12 10 - 40 U/L Final     ALT   Date Value Ref Range Status   08/27/2024 14 10 - 44 U/L Final     Anion Gap   Date Value Ref Range Status   08/27/2024 8 8 - 16 mmol/L Final     eGFR   Date Value Ref Range Status   08/27/2024 >60.0 >60 mL/min/1.73 m^2 Final       LIPID PANEL:  Lab Results   Component Value Date    CHOL 205 (H) 08/27/2024    CHOL 200 (H) 01/07/2019     Lab Results   Component Value Date    TRIG 170 (H) 08/27/2024    TRIG 267 (H) 01/07/2019     Lab Results   Component Value Date    HDL 49 08/27/2024    HDL 41 01/07/2019     Lab Results   Component Value Date    LDLCALC 122.0 08/27/2024    LDLCALC 105.6 01/07/2019       THYROID:  Lab Results   Component Value Date    TSH 1.566 08/27/2024    FREET4 1.02 08/27/2024       DIABETES:  Lab Results   Component  "Value Date    HGBA1C 5.6 08/27/2024     No results found for: "MICALBCREAT"    Assessment and Plan     Assessment & Plan    VIRAL INFECTION:  - Assessed viral symptoms as likely due to common virus, not COVID-19, based on negative test result and symptom duration.  - Explained that viral infections typically need to run their course and do not require antibiotics.  - Ms. Guevara to rest and hydrate to manage viral symptoms.  - Recommend consuming chicken noodle soup for symptomatic relief.  - Continued Tylenol and Motrin as needed for fever and pain.  - Administered COVID-19 test (result: negative).    BACK PAIN:  - Evaluated back pain, considering it potentially related to muscle strain from carrying child.  - Referred to back and spine clinic for comprehensive evaluation of chronic back pain.  - Started steroid injection in the glutes for pain and inflammation relief.  - Steroid injection given in the glutes.    HYPERLIPIDEMIA:  - Reviewed lipid panel results, determining elevated total cholesterol is not concerning due to elevated HDL levels.  - Discussed the artificial elevation of total cholesterol due to elevated HDL levels.  - Ms. Guevara to maintain a balanced diet, limiting red meat consumption.    VITAMIN D DEFICIENCY:  - Considered vitamin D deficiency as potential contributor to fatigue and depression, but not likely cause of pain.  - Explained the potential effects of vitamin D deficiency, including fatigue and depression.  - Started vitamin D supplement 50,000 units once weekly.  - Follow up in 3 months to recheck vitamin D levels.    ACID REFLUX:  - Assessed abdominal pain, suspecting acid reflux rather than recurrence of previous bacterial infection.  - Discussed the possibility of acid reflux causing abdominal pain and its relation to previous bacterial infection.  - Started acid reflux medication.    FOOT PAIN:  - Evaluated foot pain, attributing it to inadequate arch support in current " footwear.  - Educated on the importance of proper footwear for foot support and pain prevention.  - Ms. Guevara to wear supportive shoes with good arch support to prevent foot pain.          1. Flu-like symptoms  Comments:  Tested for COVID. COVID negative. Likely viral infection. COntinue OTC cold medications  Orders:  -     POCT COVID-19 Rapid Screening    2. Elevated lipids  Comments:  total cholesterol elevated due to good cholesterol being good. No acute concerns  Orders:  -     Ambulatory referral/consult to Family Practice    3. Vitamin D deficiency disease  Comments:  Will start patient on 50,000 units of vitamin D weekly. Will recheck in 3 months  Orders:  -     Ambulatory referral/consult to Family Practice  -     ergocalciferol (ERGOCALCIFEROL) 50,000 unit Cap; Take 1 capsule (50,000 Units total) by mouth every 7 days.  Dispense: 12 capsule; Refill: 0    4. Acute bilateral low back pain without sciatica  Comments:  Will refer to back and spine clinic. Will give injection of dexamethasone  Orders:  -     Ambulatory referral/consult to Back & Spine Clinic; Future; Expected date: 09/23/2024  -     dexAMETHasone injection 10 mg    5. Gastroesophageal reflux disease without esophagitis  Comments:  will start patient on protonix for symptoms. If symptoms persist despite treatment, will refer to GI due to history of H. Pylori  Orders:  -     pantoprazole (PROTONIX) 40 MG tablet; Take 1 tablet (40 mg total) by mouth once daily.  Dispense: 90 tablet; Refill: 3           No follow-ups on file.  36 of total time spent on the encounter, which includes face to face time and non-face to face time preparing to see the patient. This includes obtaining and/or reviewing separately obtained history, performing a medically appropriate examination and/or evaluation, and counseling and educating the patient/family/caregiver. Includes documenting clinical information in the electronic or other health record, independently  interpreting results (not separately reported) and communicating results to the patient/family/caregiver, with care coordination (not separately reported). Medications, tests and/or procedures ordered as necessary along with referring and communicating with other health professionals (when not separately reported).      This note was generated with the assistance of ambient listening technology. Verbal consent was obtained by the patient and accompanying visitor(s) for the recording of patient appointment to facilitate this note. I attest to having reviewed and edited the generated note for accuracy, though some syntax or spelling errors may persist. Please contact the author of this note for any clarification.

## 2024-09-16 ENCOUNTER — TELEPHONE (OUTPATIENT)
Dept: PAIN MEDICINE | Facility: CLINIC | Age: 34
End: 2024-09-16
Payer: COMMERCIAL

## 2024-09-16 ENCOUNTER — OFFICE VISIT (OUTPATIENT)
Dept: FAMILY MEDICINE | Facility: CLINIC | Age: 34
End: 2024-09-16
Payer: COMMERCIAL

## 2024-09-16 VITALS
HEIGHT: 61 IN | DIASTOLIC BLOOD PRESSURE: 70 MMHG | RESPIRATION RATE: 18 BRPM | HEART RATE: 90 BPM | BODY MASS INDEX: 32.59 KG/M2 | WEIGHT: 172.63 LBS | TEMPERATURE: 101 F | SYSTOLIC BLOOD PRESSURE: 120 MMHG

## 2024-09-16 DIAGNOSIS — K21.9 GASTROESOPHAGEAL REFLUX DISEASE WITHOUT ESOPHAGITIS: ICD-10-CM

## 2024-09-16 DIAGNOSIS — E78.5 ELEVATED LIPIDS: ICD-10-CM

## 2024-09-16 DIAGNOSIS — E55.9 VITAMIN D DEFICIENCY DISEASE: ICD-10-CM

## 2024-09-16 DIAGNOSIS — M54.50 ACUTE BILATERAL LOW BACK PAIN WITHOUT SCIATICA: ICD-10-CM

## 2024-09-16 DIAGNOSIS — R68.89 FLU-LIKE SYMPTOMS: Primary | ICD-10-CM

## 2024-09-16 LAB
CTP QC/QA: YES
SARS-COV-2 RDRP RESP QL NAA+PROBE: NEGATIVE

## 2024-09-16 PROCEDURE — 96372 THER/PROPH/DIAG INJ SC/IM: CPT | Mod: S$GLB,,, | Performed by: INTERNAL MEDICINE

## 2024-09-16 PROCEDURE — 3008F BODY MASS INDEX DOCD: CPT | Mod: CPTII,S$GLB,, | Performed by: INTERNAL MEDICINE

## 2024-09-16 PROCEDURE — 99999 PR PBB SHADOW E&M-EST. PATIENT-LVL IV: CPT | Mod: PBBFAC,,, | Performed by: INTERNAL MEDICINE

## 2024-09-16 PROCEDURE — 3044F HG A1C LEVEL LT 7.0%: CPT | Mod: CPTII,S$GLB,, | Performed by: INTERNAL MEDICINE

## 2024-09-16 PROCEDURE — 3078F DIAST BP <80 MM HG: CPT | Mod: CPTII,S$GLB,, | Performed by: INTERNAL MEDICINE

## 2024-09-16 PROCEDURE — 99214 OFFICE O/P EST MOD 30 MIN: CPT | Mod: 25,S$GLB,, | Performed by: INTERNAL MEDICINE

## 2024-09-16 PROCEDURE — 1159F MED LIST DOCD IN RCRD: CPT | Mod: CPTII,S$GLB,, | Performed by: INTERNAL MEDICINE

## 2024-09-16 PROCEDURE — 87635 SARS-COV-2 COVID-19 AMP PRB: CPT | Mod: QW,S$GLB,, | Performed by: INTERNAL MEDICINE

## 2024-09-16 PROCEDURE — 3074F SYST BP LT 130 MM HG: CPT | Mod: CPTII,S$GLB,, | Performed by: INTERNAL MEDICINE

## 2024-09-16 RX ORDER — PANTOPRAZOLE SODIUM 40 MG/1
40 TABLET, DELAYED RELEASE ORAL DAILY
Qty: 90 TABLET | Refills: 3 | Status: SHIPPED | OUTPATIENT
Start: 2024-09-16 | End: 2025-09-16

## 2024-09-16 RX ORDER — DEXAMETHASONE SODIUM PHOSPHATE 10 MG/ML
10 INJECTION INTRAMUSCULAR; INTRAVENOUS
Status: COMPLETED | OUTPATIENT
Start: 2024-09-16 | End: 2024-09-16

## 2024-09-16 RX ORDER — ERGOCALCIFEROL 1.25 MG/1
50000 CAPSULE ORAL
Qty: 12 CAPSULE | Refills: 0 | Status: SHIPPED | OUTPATIENT
Start: 2024-09-16

## 2024-09-16 RX ADMIN — DEXAMETHASONE SODIUM PHOSPHATE 10 MG: 10 INJECTION INTRAMUSCULAR; INTRAVENOUS at 09:09

## 2024-09-16 NOTE — TELEPHONE ENCOUNTER
----- Message from Mary Grace Garcia sent at 9/16/2024 10:14 AM CDT -----  Regarding: New Patient (Back & Spine Clinic)  Good Morning, patient seen by Dr. Jazzmine Strauss, has referral/consult for Acute bilateral low back pain without sciatica. Please call Annabelle Guevara (spouse) [phone] to schedule. Thanks/elr

## 2024-09-16 NOTE — TELEPHONE ENCOUNTER
Reached out to patient to schedule appointment from messages. Apt has been made.   Pt understand. All questions answered.     Paul Gee  Medical Assistant

## 2024-09-17 ENCOUNTER — PATIENT MESSAGE (OUTPATIENT)
Dept: FAMILY MEDICINE | Facility: CLINIC | Age: 34
End: 2024-09-17
Payer: COMMERCIAL

## 2024-09-18 RX ORDER — PROMETHAZINE HYDROCHLORIDE AND DEXTROMETHORPHAN HYDROBROMIDE 6.25; 15 MG/5ML; MG/5ML
5 SYRUP ORAL EVERY 4 HOURS PRN
Qty: 118 ML | Refills: 0 | Status: SHIPPED | OUTPATIENT
Start: 2024-09-18 | End: 2024-09-28

## 2024-09-19 RX ORDER — AZITHROMYCIN 1 G/1
1 POWDER, FOR SUSPENSION ORAL ONCE
Qty: 1 PACKET | Refills: 0 | Status: SHIPPED | OUTPATIENT
Start: 2024-09-19 | End: 2024-09-19

## 2024-09-23 ENCOUNTER — PATIENT MESSAGE (OUTPATIENT)
Dept: SURGERY | Facility: CLINIC | Age: 34
End: 2024-09-23
Payer: COMMERCIAL

## 2024-10-08 ENCOUNTER — PATIENT MESSAGE (OUTPATIENT)
Dept: SPORTS MEDICINE | Facility: CLINIC | Age: 34
End: 2024-10-08
Payer: COMMERCIAL

## 2024-10-08 DIAGNOSIS — M25.572 PAIN OF JOINT OF LEFT ANKLE AND FOOT: Primary | ICD-10-CM

## 2024-10-10 ENCOUNTER — HOSPITAL ENCOUNTER (OUTPATIENT)
Dept: RADIOLOGY | Facility: HOSPITAL | Age: 34
Discharge: HOME | End: 2024-10-10
Attending: STUDENT IN AN ORGANIZED HEALTH CARE EDUCATION/TRAINING PROGRAM
Payer: COMMERCIAL

## 2024-10-10 DIAGNOSIS — M25.572 PAIN OF JOINT OF LEFT ANKLE AND FOOT: ICD-10-CM

## 2024-10-10 PROCEDURE — 73721 MRI JNT OF LWR EXTRE W/O DYE: CPT | Mod: 26,LT,, | Performed by: RADIOLOGY

## 2024-10-10 PROCEDURE — 73721 MRI JNT OF LWR EXTRE W/O DYE: CPT | Mod: TC,PN,LT

## 2024-10-11 ENCOUNTER — PATIENT MESSAGE (OUTPATIENT)
Dept: SPORTS MEDICINE | Facility: CLINIC | Age: 34
End: 2024-10-11
Payer: COMMERCIAL

## 2025-04-14 ENCOUNTER — OFFICE VISIT (OUTPATIENT)
Dept: PRIMARY CARE CLINIC | Facility: CLINIC | Age: 35
End: 2025-04-14
Payer: COMMERCIAL

## 2025-04-14 ENCOUNTER — LAB VISIT (OUTPATIENT)
Dept: LAB | Facility: HOSPITAL | Age: 35
End: 2025-04-14
Attending: INTERNAL MEDICINE
Payer: COMMERCIAL

## 2025-04-14 VITALS
WEIGHT: 190.5 LBS | HEART RATE: 100 BPM | OXYGEN SATURATION: 96 % | DIASTOLIC BLOOD PRESSURE: 68 MMHG | BODY MASS INDEX: 35.99 KG/M2 | SYSTOLIC BLOOD PRESSURE: 110 MMHG | TEMPERATURE: 97 F

## 2025-04-14 DIAGNOSIS — K21.9 GASTROESOPHAGEAL REFLUX DISEASE WITHOUT ESOPHAGITIS: ICD-10-CM

## 2025-04-14 DIAGNOSIS — Z00.00 LABORATORY EXAM ORDERED AS PART OF ROUTINE GENERAL MEDICAL EXAMINATION: ICD-10-CM

## 2025-04-14 DIAGNOSIS — E55.9 VITAMIN D DEFICIENCY: ICD-10-CM

## 2025-04-14 DIAGNOSIS — Z00.00 ANNUAL PHYSICAL EXAM: Primary | ICD-10-CM

## 2025-04-14 DIAGNOSIS — E66.01 SEVERE OBESITY (BMI 35.0-35.9 WITH COMORBIDITY): ICD-10-CM

## 2025-04-14 LAB
25(OH)D3+25(OH)D2 SERPL-MCNC: 15 NG/ML (ref 30–96)
ABSOLUTE EOSINOPHIL (OHS): 0.17 K/UL
ABSOLUTE MONOCYTE (OHS): 0.45 K/UL (ref 0.3–1)
ABSOLUTE NEUTROPHIL COUNT (OHS): 6.28 K/UL (ref 1.8–7.7)
ALBUMIN SERPL BCP-MCNC: 3.7 G/DL (ref 3.5–5.2)
ALP SERPL-CCNC: 61 UNIT/L (ref 40–150)
ALT SERPL W/O P-5'-P-CCNC: 20 UNIT/L (ref 10–44)
ANION GAP (OHS): 11 MMOL/L (ref 8–16)
AST SERPL-CCNC: 17 UNIT/L (ref 11–45)
BASOPHILS # BLD AUTO: 0.03 K/UL
BASOPHILS NFR BLD AUTO: 0.3 %
BILIRUB SERPL-MCNC: 0.3 MG/DL (ref 0.1–1)
BUN SERPL-MCNC: 16 MG/DL (ref 6–20)
CALCIUM SERPL-MCNC: 9.1 MG/DL (ref 8.7–10.5)
CHLORIDE SERPL-SCNC: 103 MMOL/L (ref 95–110)
CHOLEST SERPL-MCNC: 197 MG/DL (ref 120–199)
CHOLEST/HDLC SERPL: 5.3 {RATIO} (ref 2–5)
CO2 SERPL-SCNC: 22 MMOL/L (ref 23–29)
CREAT SERPL-MCNC: 0.7 MG/DL (ref 0.5–1.4)
EAG (OHS): 114 MG/DL (ref 68–131)
ERYTHROCYTE [DISTWIDTH] IN BLOOD BY AUTOMATED COUNT: 12.6 % (ref 11.5–14.5)
GFR SERPLBLD CREATININE-BSD FMLA CKD-EPI: >60 ML/MIN/1.73/M2
GLUCOSE SERPL-MCNC: 109 MG/DL (ref 70–110)
HBA1C MFR BLD: 5.6 % (ref 4–5.6)
HCT VFR BLD AUTO: 40.3 % (ref 37–48.5)
HDLC SERPL-MCNC: 37 MG/DL (ref 40–75)
HDLC SERPL: 18.8 % (ref 20–50)
HGB BLD-MCNC: 12.9 GM/DL (ref 12–16)
IMM GRANULOCYTES # BLD AUTO: 0.02 K/UL (ref 0–0.04)
IMM GRANULOCYTES NFR BLD AUTO: 0.2 % (ref 0–0.5)
LDLC SERPL CALC-MCNC: 115.4 MG/DL (ref 63–159)
LYMPHOCYTES # BLD AUTO: 2.46 K/UL (ref 1–4.8)
MAGNESIUM SERPL-MCNC: 1.8 MG/DL (ref 1.6–2.6)
MCH RBC QN AUTO: 26.8 PG (ref 27–31)
MCHC RBC AUTO-ENTMCNC: 32 G/DL (ref 32–36)
MCV RBC AUTO: 84 FL (ref 82–98)
NONHDLC SERPL-MCNC: 160 MG/DL
NUCLEATED RBC (/100WBC) (OHS): 0 /100 WBC
PLATELET # BLD AUTO: 297 K/UL (ref 150–450)
PMV BLD AUTO: 10.5 FL (ref 9.2–12.9)
POTASSIUM SERPL-SCNC: 3.6 MMOL/L (ref 3.5–5.1)
PROT SERPL-MCNC: 7.8 GM/DL (ref 6–8.4)
RBC # BLD AUTO: 4.82 M/UL (ref 4–5.4)
RELATIVE EOSINOPHIL (OHS): 1.8 %
RELATIVE LYMPHOCYTE (OHS): 26.1 % (ref 18–48)
RELATIVE MONOCYTE (OHS): 4.8 % (ref 4–15)
RELATIVE NEUTROPHIL (OHS): 66.8 % (ref 38–73)
SODIUM SERPL-SCNC: 136 MMOL/L (ref 136–145)
TRIGL SERPL-MCNC: 223 MG/DL (ref 30–150)
TSH SERPL-ACNC: 1.4 UIU/ML (ref 0.4–4)
VIT B12 SERPL-MCNC: 234 PG/ML (ref 210–950)
WBC # BLD AUTO: 9.41 K/UL (ref 3.9–12.7)

## 2025-04-14 PROCEDURE — 80061 LIPID PANEL: CPT

## 2025-04-14 PROCEDURE — 99999 PR PBB SHADOW E&M-EST. PATIENT-LVL III: CPT | Mod: PBBFAC,,, | Performed by: INTERNAL MEDICINE

## 2025-04-14 PROCEDURE — 3074F SYST BP LT 130 MM HG: CPT | Mod: CPTII,S$GLB,, | Performed by: INTERNAL MEDICINE

## 2025-04-14 PROCEDURE — 82306 VITAMIN D 25 HYDROXY: CPT

## 2025-04-14 PROCEDURE — 85025 COMPLETE CBC W/AUTO DIFF WBC: CPT

## 2025-04-14 PROCEDURE — 3078F DIAST BP <80 MM HG: CPT | Mod: CPTII,S$GLB,, | Performed by: INTERNAL MEDICINE

## 2025-04-14 PROCEDURE — 36415 COLL VENOUS BLD VENIPUNCTURE: CPT | Mod: PN

## 2025-04-14 PROCEDURE — 1160F RVW MEDS BY RX/DR IN RCRD: CPT | Mod: CPTII,S$GLB,, | Performed by: INTERNAL MEDICINE

## 2025-04-14 PROCEDURE — 83735 ASSAY OF MAGNESIUM: CPT

## 2025-04-14 PROCEDURE — 82607 VITAMIN B-12: CPT

## 2025-04-14 PROCEDURE — 83036 HEMOGLOBIN GLYCOSYLATED A1C: CPT

## 2025-04-14 PROCEDURE — 3008F BODY MASS INDEX DOCD: CPT | Mod: CPTII,S$GLB,, | Performed by: INTERNAL MEDICINE

## 2025-04-14 PROCEDURE — 84443 ASSAY THYROID STIM HORMONE: CPT

## 2025-04-14 PROCEDURE — 80053 COMPREHEN METABOLIC PANEL: CPT

## 2025-04-14 PROCEDURE — 99395 PREV VISIT EST AGE 18-39: CPT | Mod: S$GLB,,, | Performed by: INTERNAL MEDICINE

## 2025-04-14 PROCEDURE — 99214 OFFICE O/P EST MOD 30 MIN: CPT | Mod: 25,S$GLB,, | Performed by: INTERNAL MEDICINE

## 2025-04-14 PROCEDURE — 1159F MED LIST DOCD IN RCRD: CPT | Mod: CPTII,S$GLB,, | Performed by: INTERNAL MEDICINE

## 2025-04-14 RX ORDER — PANTOPRAZOLE SODIUM 40 MG/1
TABLET, DELAYED RELEASE ORAL
Qty: 42 TABLET | Refills: 0 | Status: SHIPPED | OUTPATIENT
Start: 2025-04-14

## 2025-04-14 NOTE — PROGRESS NOTES
Subjective     Patient ID: Karlee Guevara is a 35 y.o. female.    Chief Complaint:   Here with  who helps with hx.  Wants to do labs, annual and discuss numerous concerns.    History of Present Illness    HPI:  Ms. Guevara presents for a checkup and to discuss ongoing pain issues throughout her body, particularly in her lower back and feet. Ms. Guevara reports intermittent pain throughout her body, with lower back pain being one of the most severe and disturbing symptoms. She has also developed pain in her feet. The duration of these symptoms is not explicitly stated, but it appears to be an ongoing issue for some time.    She has been evaluated by multiple doctors, including a rheumatologist, Dr. Kern, who concluded that the pain is not related to a rheumatological condition as tests came back negative.     She mentions arm pain, which may be related to a cervical disc protrusion diagnosed in 2017. She describes the pain as a type of discomfort throughout the body that makes her uncomfortable.  However, bilateral carpal tunnel sx did relieve these symptoms.    She reports feeling tired. An MRI of her back was scheduled with a surgeon in December, but the appointment was cancelled and needs to be rescheduled.  She was given the number to call and reschedule.    She complains of discomfort in her throat, describing a feeling of mucus and the need to clear her throat. This symptom was previously investigated with a camera, and doctors suggested it may be heartburn.    She feels short of breath at times, particularly when nervous. This symptom has been occurring for about 4 or 5 years. She recalls being evaluated by a doctor about this before, possibly Dr. Santana, who prescribed medication, but she does not remember the details. The doctor apparently said it was not serious.  She is also being treated by mental health for PTSD.    She mentions having insulin resistance.    She denies shortness of  breath with physical activity or exertion.    MEDICATIONS:  Ms. Guevara is on a multivitamin, Vitamin D supplement, and B12 supplement.    MEDICAL HISTORY:  Ms. Guevara has a history of disc protrusion at a cervical disc diagnosed in 2017, insulin resistance, and past B12 deficiency.    TEST RESULTS:  She had a past B12 test which showed a low result.      ROS:  General: -fever, -chills, +fatigue, -weight gain, -weight loss  Eyes: -vision changes, -redness, -discharge  ENT: -ear pain, -nasal congestion, +sore throat  Cardiovascular: -chest pain, -palpitations, -lower extremity edema  Respiratory: -cough, +shortness of breath  Gastrointestinal: -abdominal pain, -nausea, -vomiting, -diarrhea, -constipation, -blood in stool  Genitourinary: -dysuria, -hematuria, -frequency  Musculoskeletal: -joint pain, -muscle pain, +body aches, +back pain, +limb pain  Skin: -rash, -lesion  Neurological: +headache, -dizziness, -numbness, -tingling  Psychiatric: +anxiety, -depression, -sleep difficulty           Past Medical History:   Diagnosis Date    Dysmetabolic syndrome X 2/1/2021 2:40:12 PM    [facility] Historical - Endocrine/Metabolic/Immune: Insulin resistance-No Additional Notes    Dysmetabolic syndrome X 2/1/2021 2:40:12 PM    [facility] Historical - Endocrine/Metabolic/Immune: Insulin resistance-No Additional Notes    GERD (gastroesophageal reflux disease)     Greater trochanteric bursitis of both hips 5/11/2018    H. pylori infection     Insulin resistance     PCOS (polycystic ovarian syndrome)     Polycystic ovaries 7/16/2020 11:18:08 AM    [facility] Historical - DO NOT USE: PCOS (polycystic ovarian syndrome)-oligomenorrhea, elevated DHEAS    Polycystic ovaries 7/16/2020 11:18:08 AM    [facility] Historical - DO NOT USE: PCOS (polycystic ovarian syndrome)-oligomenorrhea, elevated DHEAS    Postpartum depression      Review of patient's allergies indicates:  No Known Allergies  Past Surgical History:   Procedure  Laterality Date     SECTION      INTRAUTERINE DEVICE INSERTION       No family history on file.  Social History[1]      /68 (BP Location: Right arm, Patient Position: Sitting)   Pulse 100   Temp 96.8 °F (36 °C) (Tympanic)   Wt 86.4 kg (190 lb 7.6 oz)   LMP 2025   SpO2 96%   BMI 35.99 kg/m²   Outpatient Medications as of 2025   Medication Sig Dispense Refill    dextromethorphan-guaiFENesin  mg (MUCINEX DM)  mg per 12 hr tablet Take 1 tablet by mouth every 12 (twelve) hours.      diltiazem HCl (DILTIAZEM 2% - LIDOCAINE 5% CREAM) Apply 1 application  topically 3 (three) times daily. 30 g 1    drospirenone-ethinyl estradioL (MEGAN) 3-0.03 mg per tablet Take 1 tablet by mouth once daily. 30 tablet 11    ergocalciferol (ERGOCALCIFEROL) 50,000 unit Cap Take 1 capsule (50,000 Units total) by mouth every 7 days. 12 capsule 0    hydrocortisone-pramoxine (PROCTOFOAM-HS) rectal foam Place 1 applicator rectally 2 (two) times daily. 30 g 5    magnesium gluconate 27.5 mg magne- sium (500 mg) Tab Take by mouth once.      meclizine (ANTIVERT) 25 mg tablet Take 1 tablet (25 mg total) by mouth 3 (three) times daily as needed for Dizziness. 90 tablet 0    pantoprazole (PROTONIX) 40 MG tablet Take 1 tablet every AM with water and do not eat/drink for 30 minutes 42 tablet 0    prenatal vit 40/iron/folic/dha (PRENATAL MULTI-DHA ORAL) Take by mouth.       Facility-Administered Medications as of 2025   Medication Dose Route Frequency Provider Last Rate Last Admin    copper intrauterine device 380 square mm  mm  380 mm Intrauterine  Melvi Pittman MD   380 mm at 22 1510              Objective     Physical Exam    General: No acute distress. Well-developed. Well-nourished.  Eyes: EOMI. Sclerae anicteric.  HENT: Normocephalic. Atraumatic. Nares patent. Moist oral mucosa.  Ears: Bilateral TMs clear. Bilateral EACs clear.  Cardiovascular: Regular rate. Regular rhythm. No murmurs. No  rubs. No gallops. Normal S1, S2.  Respiratory: Normal respiratory effort. Clear to auscultation bilaterally. No rales. No rhonchi. No wheezing.  Abdomen: Soft. Non-tender. Non-distended. Normoactive bowel sounds.  Musculoskeletal: No  obvious deformity.  Extremities: No lower extremity edema.  Neurological: Alert & oriented x3. No slurred speech. Normal gait.  Psychiatric: Normal mood. Normal affect. Good insight. Good judgment.  Skin: Warm. Dry. No rash.  IMAGING:  Ms. Guevara underwent an MRI of the cervical spine in 2017, which revealed a disc protrusion at one of the cervical discs. She also had an MRI of her back 8 years ago.      Obese  Bmi 36       Assessment and Plan     1. Annual physical exam    2. Laboratory exam ordered as part of routine general medical examination  -     Lipid Panel; Future; Expected date: 04/14/2025  -     Hemoglobin A1C; Future; Expected date: 04/14/2025  -     TSH; Future; Expected date: 04/14/2025  -     Comprehensive Metabolic Panel; Future; Expected date: 04/14/2025  -     CBC Auto Differential; Future; Expected date: 04/14/2025  -     Magnesium; Future; Expected date: 04/14/2025  -     Vitamin D 25-Hydroxy; Future; Expected date: 04/14/2025  -     Vitamin B12; Future; Expected date: 04/14/2025    3. Vitamin D deficiency  -     Vitamin D 25-Hydroxy; Future; Expected date: 04/14/2025    4. Gastroesophageal reflux disease without esophagitis  Comments:  will start patient on protonix for symptoms. If symptoms persist despite treatment, will refer to GI due to history of H. Pylori  Orders:  -     pantoprazole (PROTONIX) 40 MG tablet; Take 1 tablet every AM with water and do not eat/drink for 30 minutes  Dispense: 42 tablet; Refill: 0    5. Severe obesity (BMI 35.0-35.9 with comorbidity)  Comments:  LSM; likely aggravating previous back issues       Utd on pap  Ppi x 6 weeks  LSM  Labs  Reschedule appt with Dr. Daniel CARRERA/u for yearly with PCP and also in 6 weeks     Immunization  History   Administered Date(s) Administered    COVID-19, MRNA, LN-S, PF (Pfizer) (Purple Cap) 09/10/2021, 10/01/2021    Influenza 02/18/2020, 09/28/2020, 11/17/2020    Influenza - Quadrivalent 10/25/2017, 12/12/2019, 09/11/2020, 12/16/2021    Influenza - Quadrivalent - PF *Preferred* (6 months and older) 01/06/2016, 10/25/2017    MMR 10/25/2017    PPD Test 01/18/2016    Tdap 10/25/2017    Varicella 10/25/2017              This note was generated with the assistance of ambient listening technology. Verbal consent was obtained by the patient and accompanying visitor(s) for the recording of patient appointment to facilitate this note. I attest to having reviewed and edited the generated note for accuracy, though some syntax or spelling errors may persist. Please contact the author of this note for any clarification.           [1]   Social History  Socioeconomic History    Marital status:     Number of children: 1   Occupational History    Occupation: Unemployed   Tobacco Use    Smoking status: Never    Smokeless tobacco: Never   Substance and Sexual Activity    Alcohol use: Never    Drug use: No    Sexual activity: Yes     Partners: Male     Comment: postpartum

## 2025-04-15 ENCOUNTER — RESULTS FOLLOW-UP (OUTPATIENT)
Dept: PRIMARY CARE CLINIC | Facility: CLINIC | Age: 35
End: 2025-04-15

## 2025-04-15 DIAGNOSIS — E66.01 SEVERE OBESITY (BMI 35.0-35.9 WITH COMORBIDITY): Primary | ICD-10-CM

## 2025-04-16 ENCOUNTER — PATIENT MESSAGE (OUTPATIENT)
Dept: INTERNAL MEDICINE | Facility: CLINIC | Age: 35
End: 2025-04-16
Payer: COMMERCIAL

## 2025-05-01 ENCOUNTER — TELEPHONE (OUTPATIENT)
Dept: PAIN MEDICINE | Facility: CLINIC | Age: 35
End: 2025-05-01
Payer: COMMERCIAL

## 2025-05-02 ENCOUNTER — TELEPHONE (OUTPATIENT)
Dept: PAIN MEDICINE | Facility: CLINIC | Age: 35
End: 2025-05-02
Payer: COMMERCIAL

## 2025-05-02 NOTE — TELEPHONE ENCOUNTER
Patient called and confirmed time and location for appointment. Patient given instructions about how our Interventional Pain Department practices. Patient advised to bring any prior images to the appointment.    Spoke with patient, she is a former [facility] patient where she had injection in 2018.  Pain increasing for over a year.  Has been seen at outside of [facility] no injections done.

## 2025-05-06 ENCOUNTER — OFFICE VISIT (OUTPATIENT)
Dept: PAIN MEDICINE | Facility: CLINIC | Age: 35
End: 2025-05-06
Payer: COMMERCIAL

## 2025-05-06 ENCOUNTER — HOSPITAL ENCOUNTER (OUTPATIENT)
Dept: RADIOLOGY | Facility: HOSPITAL | Age: 35
Discharge: HOME | End: 2025-05-06
Attending: ANESTHESIOLOGY
Payer: COMMERCIAL

## 2025-05-06 VITALS
DIASTOLIC BLOOD PRESSURE: 72 MMHG | WEIGHT: 187.38 LBS | SYSTOLIC BLOOD PRESSURE: 111 MMHG | HEART RATE: 101 BPM | HEIGHT: 61 IN | BODY MASS INDEX: 35.38 KG/M2

## 2025-05-06 DIAGNOSIS — M79.7 FIBROMYALGIA: ICD-10-CM

## 2025-05-06 DIAGNOSIS — M54.12 CERVICAL RADICULOPATHY: ICD-10-CM

## 2025-05-06 DIAGNOSIS — M47.816 LUMBAR SPONDYLOSIS: ICD-10-CM

## 2025-05-06 DIAGNOSIS — M25.50 POLYARTHRALGIA: ICD-10-CM

## 2025-05-06 DIAGNOSIS — M54.16 LUMBAR RADICULOPATHY: ICD-10-CM

## 2025-05-06 DIAGNOSIS — M47.812 CERVICAL SPONDYLOSIS: Primary | ICD-10-CM

## 2025-05-06 PROBLEM — M79.672 CHRONIC PAIN OF BOTH FEET: Status: ACTIVE | Noted: 2025-02-25

## 2025-05-06 PROBLEM — M79.671 CHRONIC PAIN OF BOTH FEET: Status: ACTIVE | Noted: 2025-02-25

## 2025-05-06 PROBLEM — G89.29 CHRONIC PAIN OF BOTH FEET: Status: ACTIVE | Noted: 2025-02-25

## 2025-05-06 PROCEDURE — 72052 X-RAY EXAM NECK SPINE 6/>VWS: CPT | Mod: TC,PN

## 2025-05-06 PROCEDURE — 1159F MED LIST DOCD IN RCRD: CPT | Mod: CPTII,S$GLB,, | Performed by: ANESTHESIOLOGY

## 2025-05-06 PROCEDURE — 3078F DIAST BP <80 MM HG: CPT | Mod: CPTII,S$GLB,, | Performed by: ANESTHESIOLOGY

## 2025-05-06 PROCEDURE — G2211 COMPLEX E/M VISIT ADD ON: HCPCS | Mod: S$GLB,,, | Performed by: ANESTHESIOLOGY

## 2025-05-06 PROCEDURE — 3008F BODY MASS INDEX DOCD: CPT | Mod: CPTII,S$GLB,, | Performed by: ANESTHESIOLOGY

## 2025-05-06 PROCEDURE — 72114 X-RAY EXAM L-S SPINE BENDING: CPT | Mod: TC,PN

## 2025-05-06 PROCEDURE — 99999 PR PBB SHADOW E&M-EST. PATIENT-LVL IV: CPT | Mod: PBBFAC,,, | Performed by: ANESTHESIOLOGY

## 2025-05-06 PROCEDURE — 72052 X-RAY EXAM NECK SPINE 6/>VWS: CPT | Mod: 26,,, | Performed by: RADIOLOGY

## 2025-05-06 PROCEDURE — 1160F RVW MEDS BY RX/DR IN RCRD: CPT | Mod: CPTII,S$GLB,, | Performed by: ANESTHESIOLOGY

## 2025-05-06 PROCEDURE — 99204 OFFICE O/P NEW MOD 45 MIN: CPT | Mod: S$GLB,,, | Performed by: ANESTHESIOLOGY

## 2025-05-06 PROCEDURE — 3074F SYST BP LT 130 MM HG: CPT | Mod: CPTII,S$GLB,, | Performed by: ANESTHESIOLOGY

## 2025-05-06 PROCEDURE — 3044F HG A1C LEVEL LT 7.0%: CPT | Mod: CPTII,S$GLB,, | Performed by: ANESTHESIOLOGY

## 2025-05-06 PROCEDURE — 72114 X-RAY EXAM L-S SPINE BENDING: CPT | Mod: 26,,, | Performed by: RADIOLOGY

## 2025-05-06 RX ORDER — MELOXICAM 15 MG/1
15 TABLET ORAL DAILY PRN
Qty: 30 TABLET | Refills: 1 | Status: SHIPPED | OUTPATIENT
Start: 2025-05-06

## 2025-05-06 NOTE — PROGRESS NOTES
New Patient Interventional Pain Note (Initial Visit)    Referring Physician: Herminio Minaya    PCP: Jazzmine Strauss DO    Chief Complaint:  Neck and lower back pain        History and physical obtained with the help of patient's  for triggers translation    SUBJECTIVE:    Karlee Guevara is a 35 y.o. female who presents to the clinic for the evaluation of neck and lower back pain.   Patient reports over 5 year history of neck and lower back pain.  Patient reports that pain increased after having her child.  Patient denies any previous surgeries in his cervical or lumbar spine.   Neck pain is described as a throbbing aching pain that starts at the middle and base of the neck.  This pain then radiates to the bilateral trapezius areas and then associated occipital headaches bilaterally.  Patient does report aching throbbing pain in her bilateral arms, but she is unsure if this is related to her neck pain.  Pain is worse with extension and lifting, better with flexion.  Lower back pain is described as a aching throbbing pain in a band across her lower lumbar spine.  Patient reports associated radiation to her right hip.  Patient does report aching pain in her bilateral lower extremities in bilateral feet, however she is unsure if this related to lower back.  Pain is worse with extension and prolonged activity, better with rest.  Pain is currently rated a 2/10, but can increase to an 8/10 with exacerbating activity.  Patient denies any fevers, chills, saddle anesthesia, or bowel and bladder incontinence.      Non-Pharmacologic Treatments:  Physical Therapy/Home Exercise: yes  Ice/Heat:yes  TENS: yes  Acupuncture: no  Massage: yes  Chiropractic: yes        Previous Pain Medications:  NSAIDs, Tylenol, muscle relaxers, neuropathics, opioids, topicals       report:  Reviewed and consistent with medication use as prescribed.    Pain Procedures:   Remote history of lumbar medial branch block    Pain  Disability Index Review:         5/6/2025    10:57 AM 7/26/2018     2:46 PM 7/5/2018     1:50 PM   Last 3 PDI Scores   Pain Disability Index (PDI) 46 23 23       Imaging:     Results for orders placed during the hospital encounter of 07/11/18    MRI Lumbar Spine Without Contrast    Narrative  EXAMINATION:  MRI LUMBAR SPINE WITHOUT CONTRAST    CLINICAL HISTORY:  Spondylosis without myelopathy or radiculopathy, lumbar regionLow back pain, >6wks conservative tx, persistent-progressive sx, surgical candidate;    TECHNIQUE:  Sagittal T1, sagittal T2, sagittal STIR, axial T1 and axial T2 weighted images of the lumbar spine obtained without contrast.    FINDINGS:  Transitional S1 segment.  The spinal canal is within normal limits.  The vertebral body heights are well maintained, with no fracture.  No marrow signal abnormality suspicious for an infiltrative process.    The conus is normal in appearance, and terminates at the L1-L2 level.  The adjacent soft tissue structures show no significant abnormalities.    L1-L2: Normal.    L2-L3: Normal.    L3-L4: Normal.    L4-L5: Normal.    L5-S1: Minimal disc bulge.    S1-S2: Minimal disc bulge.  Mild degenerative changes of the facets.    IMPRESSION:    Minimal disc bulges at the L5-S1 and S1-S2 levels.  S1 is a transitional vertebra.  Mild degenerative changes of the facets at the S1-S2 level.  No evidence of nerve root impingement at any level.  Mild bilateral bony neural foraminal narrowing at the L5-S1 and S1-S2 levels.      Electronically signed by: Ricco Gonzales MD  Date:    07/12/2018  Time:    09:22        Results for orders placed during the hospital encounter of 09/05/17    MRI Cervical Spine Without Contrast    Narrative  Exam: MRI of the cervical spine without contrast.    History:  Neck pain, right arm pain. Radiculopathy, cervical region.    Findings:     Alignment is normal. The cervical cord demonstrates normal morphology and signal. The craniocervical junction is  unremarkable.    Small central disc protrusion at C5-6, without significant mass effect on the thecal sac. Remainder of the intervertebral discs demonstrate normal morphology and signal. Central canal and neural foramina are widely patent.  IMPRESSION:  Small central disc protrusion at C5-6 without significant mass effect. Otherwise unremarkable exam.      Electronically signed by: MATHIEU VILLATORO MD  Date:     09/06/17  Time:    08:52    No results found for this or any previous visit.    No results found for this or any previous visit.    No results found for this or any previous visit.    No results found for this or any previous visit.    No results found for this or any previous visit.    No results found for this or any previous visit.    No results found for this or any previous visit.    No results found for this or any previous visit.    No results found for this or any previous visit.    Results for orders placed during the hospital encounter of 08/29/17    X-Ray Lumbar Complete With Flex And Ext    Narrative  Seven views of the lumbar spine including flexion and extension views.    Findings: The vertebral bodies demonstrate normal height.  The alignment is within normal limits. The disk space heights are maintained. Mild facet arthropathy suspected at L5-S1 level bilaterally. No pars defects.No listhesis noted on the flexion or extension views.  IMPRESSION:  As above      Electronically signed by: CYRIL SIERRA D.O.  Date:     08/29/17  Time:    15:58        Past Medical History:   Diagnosis Date    Dysmetabolic syndrome X 2/1/2021 2:40:12 PM    [facility] Historical - Endocrine/Metabolic/Immune: Insulin resistance-No Additional Notes    Dysmetabolic syndrome X 2/1/2021 2:40:12 PM    [facility] Historical - Endocrine/Metabolic/Immune: Insulin resistance-No Additional Notes    GERD (gastroesophageal reflux disease)     Greater trochanteric bursitis of both hips 5/11/2018    H. pylori infection      Insulin resistance     PCOS (polycystic ovarian syndrome)     Polycystic ovaries 2020 11:18:08 AM    [facility] Historical - DO NOT USE: PCOS (polycystic ovarian syndrome)-oligomenorrhea, elevated DHEAS    Polycystic ovaries 2020 11:18:08 AM    [facility] Historical - DO NOT USE: PCOS (polycystic ovarian syndrome)-oligomenorrhea, elevated DHEAS    Postpartum depression      Past Surgical History:   Procedure Laterality Date     SECTION      INTRAUTERINE DEVICE INSERTION       Social History[1]  No family history on file.    Review of patient's allergies indicates:  No Known Allergies    Current Outpatient Medications   Medication Sig    dextromethorphan-guaiFENesin  mg (MUCINEX DM)  mg per 12 hr tablet Take 1 tablet by mouth every 12 (twelve) hours.    diltiazem HCl (DILTIAZEM 2% - LIDOCAINE 5% CREAM) Apply 1 application  topically 3 (three) times daily.    magnesium gluconate 27.5 mg magne- sium (500 mg) Tab Take by mouth once.    pantoprazole (PROTONIX) 40 MG tablet Take 1 tablet every AM with water and do not eat/drink for 30 minutes    meclizine (ANTIVERT) 25 mg tablet Take 1 tablet (25 mg total) by mouth 3 (three) times daily as needed for Dizziness.    meloxicam (MOBIC) 15 MG tablet Take 1 tablet (15 mg total) by mouth daily as needed.     Current Facility-Administered Medications   Medication    copper intrauterine device 380 square mm  mm         ROS  Review of Systems   Constitutional:  Negative for chills, diaphoresis, fatigue and fever.   HENT:  Negative for ear discharge, ear pain, rhinorrhea, trouble swallowing and voice change.    Respiratory:  Negative for chest tightness, shortness of breath, wheezing and stridor.    Cardiovascular:  Negative for chest pain and leg swelling.   Gastrointestinal:  Negative for blood in stool, diarrhea, nausea and vomiting.   Endocrine: Negative for cold intolerance and heat intolerance.   Genitourinary:  Negative for dysuria,  "hematuria and urgency.   Musculoskeletal:  Positive for arthralgias, back pain, gait problem, joint swelling, myalgias, neck pain and neck stiffness.   Skin:  Negative for rash.   Neurological:  Positive for weakness and headaches. Negative for tremors, seizures, speech difficulty, light-headedness and numbness.   Hematological:  Does not bruise/bleed easily.   Psychiatric/Behavioral:  Negative for agitation, confusion and suicidal ideas.             OBJECTIVE:  /72   Pulse 101   Ht 5' 1" (1.549 m)   Wt 85 kg (187 lb 6.3 oz)   LMP 04/25/2025   BMI 35.41 kg/m²         Physical Exam  Constitutional:       Appearance: Normal appearance.   HENT:      Head: Normocephalic and atraumatic.   Eyes:      Extraocular Movements: Extraocular movements intact.      Pupils: Pupils are equal, round, and reactive to light.   Cardiovascular:      Pulses: Normal pulses.   Pulmonary:      Effort: Pulmonary effort is normal.   Skin:     General: Skin is warm.      Capillary Refill: Capillary refill takes less than 2 seconds.   Neurological:      Mental Status: She is alert and oriented to person, place, and time.      Sensory: No sensory deficit.      Motor: No weakness or abnormal muscle tone.      Gait: Gait abnormal.      Deep Tendon Reflexes: Reflexes normal. Babinski sign absent on the right side. Babinski sign absent on the left side.   Psychiatric:         Mood and Affect: Mood normal.         Behavior: Behavior normal.         Thought Content: Thought content normal.           Musculoskeletal:    Cervical Exam  Incision: no  Pain with Flexion: no  Pain with Extension: yes  Paraspinous TTP:  Positive bilaterally  Facet TTP:  C5-C6  Spurling:  Negative bilaterally  ROM:  Decreased        Lumbar Exam  Incision: no  Pain with Flexion: yes  Pain with Extension: yes  ROM:  Decreased  Paraspinous TTP:  Positive bilaterally  Facet TTP:  L5-S1  Facet Loading:  Positive bilaterally  SLR:  Positive on the right  SIJ TTP:  " Negative bilaterally  MONY:  Negative bilaterally      LABS:  Lab Results   Component Value Date    WBC 9.41 04/14/2025    HGB 12.9 04/14/2025    HCT 40.3 04/14/2025    MCV 84 04/14/2025     04/14/2025       CMP  Sodium   Date Value Ref Range Status   04/14/2025 136 136 - 145 mmol/L Final   08/27/2024 139 136 - 145 mmol/L Final     Potassium   Date Value Ref Range Status   04/14/2025 3.6 3.5 - 5.1 mmol/L Final   08/27/2024 4.0 3.5 - 5.1 mmol/L Final     Chloride   Date Value Ref Range Status   04/14/2025 103 95 - 110 mmol/L Final   08/27/2024 107 95 - 110 mmol/L Final     CO2   Date Value Ref Range Status   04/14/2025 22 (L) 23 - 29 mmol/L Final   08/27/2024 24 23 - 29 mmol/L Final     Glucose   Date Value Ref Range Status   04/14/2025 109 70 - 110 mg/dL Final   08/27/2024 116 (H) 70 - 110 mg/dL Final     BUN   Date Value Ref Range Status   04/14/2025 16 6 - 20 mg/dL Final     Creatinine   Date Value Ref Range Status   04/14/2025 0.7 0.5 - 1.4 mg/dL Final     Calcium   Date Value Ref Range Status   04/14/2025 9.1 8.7 - 10.5 mg/dL Final   08/27/2024 9.1 8.7 - 10.5 mg/dL Final     Protein Total   Date Value Ref Range Status   04/14/2025 7.8 6.0 - 8.4 gm/dL Final     Total Protein   Date Value Ref Range Status   08/27/2024 7.7 6.0 - 8.4 g/dL Final     Albumin   Date Value Ref Range Status   04/14/2025 3.7 3.5 - 5.2 g/dL Final   08/27/2024 4.3 3.9 - 4.9 g/dL Final   08/27/2024 3.6 3.5 - 5.2 g/dL Final     Total Bilirubin   Date Value Ref Range Status   08/27/2024 0.3 0.1 - 1.0 mg/dL Final     Comment:     For infants and newborns, interpretation of results should be based  on gestational age, weight and in agreement with clinical  observations.    Premature Infant recommended reference ranges:  Up to 24 hours.............<8.0 mg/dL  Up to 48 hours............<12.0 mg/dL  3-5 days..................<15.0 mg/dL  6-29 days.................<15.0 mg/dL       Bilirubin Total   Date Value Ref Range Status   04/14/2025  0.3 0.1 - 1.0 mg/dL Final     Comment:     For infants and newborns, interpretation of results should be based   on gestational age, weight and in agreement with clinical   observations.    Premature Infant recommended reference ranges:   0-24 hours:  <8.0 mg/dL   24-48 hours: <12.0 mg/dL   3-5 days:    <15.0 mg/dL   6-29 days:   <15.0 mg/dL     Alkaline Phosphatase   Date Value Ref Range Status   08/27/2024 53 (L) 55 - 135 U/L Final     ALP   Date Value Ref Range Status   04/14/2025 61 40 - 150 unit/L Final     AST   Date Value Ref Range Status   04/14/2025 17 11 - 45 unit/L Final   08/27/2024 12 10 - 40 U/L Final     ALT   Date Value Ref Range Status   04/14/2025 20 10 - 44 unit/L Final   08/27/2024 14 10 - 44 U/L Final     Anion Gap   Date Value Ref Range Status   04/14/2025 11 8 - 16 mmol/L Final     eGFR if    Date Value Ref Range Status   02/18/2020 >60.0 >60 mL/min/1.73 m^2 Final     eGFR if non    Date Value Ref Range Status   02/18/2020 >60.0 >60 mL/min/1.73 m^2 Final     Comment:     Calculation used to obtain the estimated glomerular filtration  rate (eGFR) is the CKD-EPI equation.          Lab Results   Component Value Date    HGBA1C 5.6 04/14/2025             ASSESSMENT:       35 y.o. year old female with neck and lower back pain, consistent with     1. Cervical spondylosis  meloxicam (MOBIC) 15 MG tablet      2. Cervical radiculopathy  meloxicam (MOBIC) 15 MG tablet    X-Ray Cervical Spine 5 View W Flex Extxt      3. Lumbar spondylosis  meloxicam (MOBIC) 15 MG tablet      4. Lumbar radiculopathy  meloxicam (MOBIC) 15 MG tablet    X-Ray Lumbar Complete Including Flex And Ext      5. Fibromyalgia  meloxicam (MOBIC) 15 MG tablet      6. Polyarthralgia  meloxicam (MOBIC) 15 MG tablet        Cervical spondylosis  -     meloxicam (MOBIC) 15 MG tablet; Take 1 tablet (15 mg total) by mouth daily as needed.  Dispense: 30 tablet; Refill: 1    Cervical radiculopathy  -      meloxicam (MOBIC) 15 MG tablet; Take 1 tablet (15 mg total) by mouth daily as needed.  Dispense: 30 tablet; Refill: 1  -     X-Ray Cervical Spine 5 View W Flex Extxt; Future; Expected date: 05/06/2025    Lumbar spondylosis  -     meloxicam (MOBIC) 15 MG tablet; Take 1 tablet (15 mg total) by mouth daily as needed.  Dispense: 30 tablet; Refill: 1    Lumbar radiculopathy  -     meloxicam (MOBIC) 15 MG tablet; Take 1 tablet (15 mg total) by mouth daily as needed.  Dispense: 30 tablet; Refill: 1  -     X-Ray Lumbar Complete Including Flex And Ext; Future; Expected date: 05/06/2025    Fibromyalgia  -     meloxicam (MOBIC) 15 MG tablet; Take 1 tablet (15 mg total) by mouth daily as needed.  Dispense: 30 tablet; Refill: 1    Polyarthralgia  -     meloxicam (MOBIC) 15 MG tablet; Take 1 tablet (15 mg total) by mouth daily as needed.  Dispense: 30 tablet; Refill: 1             PLAN:   - Interventions:   - none at this time.  Unclear if neck and lower back pain is consistent with facet disease and radiculopathy versus a diffuse pain syndrome like fibromyalgia.  We will obtain updated x-rays of cervical and lumbar spine.  Can consider updated MRIs as well as EMG and nerve conduction study in the future.    - Anticoagulation use:   No no anticoagulation    - Medications:  - start meloxicam 15 mg daily p.r.n.    - Therapy:   - patient has completed multiple rounds of formal physical therapy with transient relief.  Physician lead home physical therapy given today in clinic.    - Imaging/Diagnostic:  - we will obtain updated x-rays of cervical and lumbar spine to further evaluate neck and lower back pain  - can consider updated MRIs of cervical and lumbar spine pending results of x-rays and continuing pain  - can consider EMG and nerve conduction study of bilateral upper and lower extremities in the future    - Consults:   - can consider referral to Rheumatology for diffuse pain syndromes future  - can consider referral to  Neurology in the future      - Patient Questions: Answered all of the patient's questions regarding diagnosis, therapy, and treatment     This condition does not require this patient to take time off of work, and the primary goal of our Pain Management services is to improve the patient's functional capacity.     - Follow up visit: return to clinic in 4-5 weeks    Visit today included increased complexity associated with the care of the episodic problem of chronic pain which was addressed and continue to manage the longitudinal care of the patient due to the serious and/or complex managed problem(s) listed above.      The above plan and management options were discussed at length with patient. Patient is in agreement with the above and verbalized understanding.    I discussed the goals of interventional chronic pain management with the patient on today's visit.  I explained the utility of injections for diagnostic and therapeutic purposes.  We discussed a multimodal approach to pain including treating the patient's given worst pain at any given time.  We will use a systematic approach to addressing pain.  We will also adopt a multimodal approach that includes injections, adjuvant medications, physical therapy, at times psychiatry.  There may be a limited role for opioid use intermittently in the treatment of pain, more particularly for acute pain although no one approach can be used as a sole treatment modality.    I emphasized the importance of regular exercise, core strengthening and stretching, diet and weight loss as a cornerstone of long-term pain management.      Maximino Sanchez MD  Interventional Pain Management  [facility]    Future Appointments   Date Time Provider Department Center   6/11/2025  3:00 PM Maximino Sanchez MD [facility]           Disclaimer:  This note was prepared using voice recognition system and is likely to have sound alike errors that may have been overlooked  even after proof reading.  Please call me with any questions    I spent a total of 45 minutes on the day of the visit.  This includes face to face time and non-face to face time preparing to see the patient (eg, review of tests), obtaining and/or reviewing separately obtained history, documenting clinical information in the electronic or other health record, independently interpreting results and communicating results to the patient/family/caregiver, or care coordinator.           [1]   Social History  Socioeconomic History    Marital status:     Number of children: 1   Occupational History    Occupation: Unemployed   Tobacco Use    Smoking status: Never    Smokeless tobacco: Never   Substance and Sexual Activity    Alcohol use: Never    Drug use: No    Sexual activity: Yes     Partners: Male     Comment: postpartum

## 2025-05-11 ENCOUNTER — PATIENT MESSAGE (OUTPATIENT)
Dept: PAIN MEDICINE | Facility: CLINIC | Age: 35
End: 2025-05-11
Payer: COMMERCIAL

## 2025-07-29 ENCOUNTER — PATIENT MESSAGE (OUTPATIENT)
Dept: PRIMARY CARE CLINIC | Facility: CLINIC | Age: 35
End: 2025-07-29
Payer: COMMERCIAL

## 2025-07-31 ENCOUNTER — OFFICE VISIT (OUTPATIENT)
Dept: PRIMARY CARE CLINIC | Facility: CLINIC | Age: 35
End: 2025-07-31
Payer: COMMERCIAL

## 2025-07-31 VITALS
HEART RATE: 108 BPM | TEMPERATURE: 99 F | OXYGEN SATURATION: 97 % | HEIGHT: 61 IN | SYSTOLIC BLOOD PRESSURE: 112 MMHG | WEIGHT: 175.38 LBS | BODY MASS INDEX: 33.11 KG/M2 | DIASTOLIC BLOOD PRESSURE: 76 MMHG

## 2025-07-31 DIAGNOSIS — Z00.00 ENCOUNTER FOR SCREENING AND PREVENTATIVE CARE: ICD-10-CM

## 2025-07-31 DIAGNOSIS — E55.9 VITAMIN D DEFICIENCY: ICD-10-CM

## 2025-07-31 DIAGNOSIS — H69.90 DYSFUNCTION OF EUSTACHIAN TUBE, UNSPECIFIED LATERALITY: Primary | ICD-10-CM

## 2025-07-31 DIAGNOSIS — K21.9 GASTROESOPHAGEAL REFLUX DISEASE WITHOUT ESOPHAGITIS: ICD-10-CM

## 2025-07-31 PROCEDURE — 1160F RVW MEDS BY RX/DR IN RCRD: CPT | Mod: CPTII,S$GLB,, | Performed by: INTERNAL MEDICINE

## 2025-07-31 PROCEDURE — 3044F HG A1C LEVEL LT 7.0%: CPT | Mod: CPTII,S$GLB,, | Performed by: INTERNAL MEDICINE

## 2025-07-31 PROCEDURE — 3008F BODY MASS INDEX DOCD: CPT | Mod: CPTII,S$GLB,, | Performed by: INTERNAL MEDICINE

## 2025-07-31 PROCEDURE — 3078F DIAST BP <80 MM HG: CPT | Mod: CPTII,S$GLB,, | Performed by: INTERNAL MEDICINE

## 2025-07-31 PROCEDURE — 1159F MED LIST DOCD IN RCRD: CPT | Mod: CPTII,S$GLB,, | Performed by: INTERNAL MEDICINE

## 2025-07-31 PROCEDURE — 3074F SYST BP LT 130 MM HG: CPT | Mod: CPTII,S$GLB,, | Performed by: INTERNAL MEDICINE

## 2025-07-31 PROCEDURE — G2211 COMPLEX E/M VISIT ADD ON: HCPCS | Mod: S$GLB,,, | Performed by: INTERNAL MEDICINE

## 2025-07-31 PROCEDURE — 99215 OFFICE O/P EST HI 40 MIN: CPT | Mod: S$GLB,,, | Performed by: INTERNAL MEDICINE

## 2025-07-31 PROCEDURE — 99999 PR PBB SHADOW E&M-EST. PATIENT-LVL III: CPT | Mod: PBBFAC,,, | Performed by: INTERNAL MEDICINE

## 2025-07-31 RX ORDER — MONTELUKAST SODIUM 10 MG/1
TABLET ORAL
Qty: 30 TABLET | Refills: 0 | Status: SHIPPED | OUTPATIENT
Start: 2025-07-31

## 2025-07-31 RX ORDER — AZELASTINE 1 MG/ML
1 SPRAY, METERED NASAL 2 TIMES DAILY
Qty: 30 ML | Refills: 0 | Status: SHIPPED | OUTPATIENT
Start: 2025-07-31

## 2025-07-31 RX ORDER — PANTOPRAZOLE SODIUM 40 MG/1
TABLET, DELAYED RELEASE ORAL
Qty: 42 TABLET | Refills: 0 | Status: SHIPPED | OUTPATIENT
Start: 2025-07-31

## 2025-07-31 NOTE — PROGRESS NOTES
Subjective     Patient ID: Karlee Guevara is a 35 y.o. female.    Chief Complaint: Tinnitus (Right ear) and Follow-up    Here with  who helps with hx.   used.    History of Present Illness    CHIEF COMPLAINT:  Ms. Guevara presents today with right ear discomfort and ringing.    EAR SYMPTOMS:  She reports right ear symptoms including persistent muffled sound quality, similar to experiencing ear pressure during air travel. She describes localized pressure in the right ear.    ENT SYMPTOMS:  She reports a persistent sensation of mucus in throat for approximately three years with a distinct sensation when swallowing. She also does belch and have indigestion.  She did not fill the Protonix for this prescribed at the last visit in April.    GASTROINTESTINAL:  Also, she experiences bloating. She has been using sparkling water for symptom relief.    ANXIETY:  She experiences tachycardia and shortness of breath during periods of heightened emotions such as anger or stress. She reports these symptoms feel more intense than usual but denies cardiac symptoms outside of emotional stress.      ROS:  General: -fever, -chills, -fatigue, -weight gain, -weight loss  Eyes: -vision changes, -redness, -discharge  ENT: +ear pain, -nasal congestion, -sore throat, +tinnitus, +ear pressure, +difficulty hearing, +post nasal drip  Cardiovascular: -chest pain, +palpitations, -lower extremity edema, +feelings of fast heart rate  Respiratory: -cough, +shortness of breath  Gastrointestinal: +abdominal pain, -nausea, -vomiting, -diarrhea, -constipation, -blood in stool, +difficulty swallowing, +bloating  Genitourinary: -dysuria, -hematuria, -frequency  Musculoskeletal: -joint pain, -muscle pain, +back pain  Skin: -rash, -lesion  Neurological: -headache, -dizziness, -numbness, -tingling  Psychiatric: -anxiety, -depression, -sleep difficulty          Past Medical History:   Diagnosis Date    Dysmetabolic syndrome X  "2021 2:40:12 PM    [facility] - Endocrine/Metabolic/Immune: Insulin resistance-No Additional Notes    Dysmetabolic syndrome X 2021 2:40:12 PM    [facility] - Endocrine/Metabolic/Immune: Insulin resistance-No Additional Notes    GERD (gastroesophageal reflux disease)     Greater trochanteric bursitis of both hips 2018    H. pylori infection     Insulin resistance     PCOS (polycystic ovarian syndrome)     Polycystic ovaries 2020 11:18:08 AM    [facility] Historical - DO NOT USE: PCOS (polycystic ovarian syndrome)-oligomenorrhea, elevated DHEAS    Polycystic ovaries 2020 11:18:08 AM    [facility] Historical - DO NOT USE: PCOS (polycystic ovarian syndrome)-oligomenorrhea, elevated DHEAS    Postpartum depression      Review of patient's allergies indicates:  No Known Allergies  Past Surgical History:   Procedure Laterality Date     SECTION      INTRAUTERINE DEVICE INSERTION       No family history on file.  Social History[1]      /76 (BP Location: Right arm)   Pulse 108   Temp 98.5 °F (36.9 °C) (Tympanic)   Ht 5' 1" (1.549 m)   Wt 79.5 kg (175 lb 6 oz)   LMP 2025 (Approximate)   SpO2 97%   BMI 33.14 kg/m²   Outpatient Medications as of 2025   Medication Sig Dispense Refill    diltiazem HCl (DILTIAZEM 2% - LIDOCAINE 5% CREAM) Apply 1 application  topically 3 (three) times daily. 30 g 1    magnesium gluconate 27.5 mg magne- sium (500 mg) Tab Take by mouth once.      meloxicam (MOBIC) 15 MG tablet Take 1 tablet (15 mg total) by mouth daily as needed. 30 tablet 1    azelastine (ASTELIN) 137 mcg (0.1 %) nasal spray 1 spray (137 mcg total) by Nasal route 2 (two) times daily. 30 mL 0    dextromethorphan-guaiFENesin  mg (MUCINEX DM)  mg per 12 hr tablet Take 1 tablet by mouth every 12 (twelve) hours. (Patient not taking: Reported on 2025)      meclizine (ANTIVERT) 25 mg tablet Take 1 tablet (25 mg total) by mouth 3 (three) " times daily as needed for Dizziness. 90 tablet 0    pantoprazole (PROTONIX) 40 MG tablet Take 1 tablet every AM with water and do not eat/drink for 30 minutes 42 tablet 0     Facility-Administered Medications as of 7/31/2025   Medication Dose Route Frequency Provider Last Rate Last Admin    copper intrauterine device 380 square mm  mm  380 mm Intrauterine  Melvi Pittman MD   380 mm at 07/26/22 1510              Objective     Physical Exam    General: No acute distress. Well-developed. Well-nourished.  Eyes: EOMI. Sclerae anicteric.  HENT: Normocephalic. Atraumatic.   Ears: Bilateral TMs clear. Bilateral EACs clear.  Cardiovascular: Regular rate.   Respiratory: Normal respiratory effort.   Abdomen: obese  Musculoskeletal: No  obvious deformity.  Extremities: No lower extremity edema.  Neurological: Alert & oriented x3. No slurred speech. Normal gait.  Psychiatric: Normal mood. Normal affect. Good insight. Good judgment.  Skin: Warm. Dry. No rash.      Bmi 33       Assessment and Plan     1. Dysfunction of Eustachian tube, unspecified laterality  -     azelastine (ASTELIN) 137 mcg (0.1 %) nasal spray; 1 spray (137 mcg total) by Nasal route 2 (two) times daily.  Dispense: 30 mL; Refill: 0  -     montelukast (SINGULAIR) 10 mg tablet; Take 1 tablet nightly for allergies.  Dispense: 30 tablet; Refill: 0    2. Gastroesophageal reflux disease without esophagitis  Comments:  will start patient on protonix for symptoms. If symptoms persist despite treatment, will refer to GI due to history of H. Pylori  Orders:  -     pantoprazole (PROTONIX) 40 MG tablet; Take 1 tablet every AM with water and do not eat/drink for 30 minutes  Dispense: 42 tablet; Refill: 0    3. Vitamin D deficiency  -     Vitamin D; Future; Expected date: 08/07/2025    4. Encounter for screening and preventative care  -     Lipid Panel; Future; Expected date: 08/07/2025       [place] diet.  Can f/u if not resolved after 6 week ppi.  Wasn't fasting for  last panel.      Immunization History   Administered Date(s) Administered    COVID-19, MRNA, LN-S, PF (Pfizer) (Purple Cap) 09/10/2021, 10/01/2021    Influenza 02/18/2020, 09/28/2020, 11/17/2020    Influenza - Quadrivalent 10/25/2017, 12/12/2019, 09/11/2020, 12/16/2021    Influenza - Quadrivalent - PF *Preferred* (6 months and older) 01/06/2016, 10/25/2017    MMR 10/25/2017    PPD Test 01/18/2016    Tdap 10/25/2017    Varicella 10/25/2017       I spent a total of 40 minutes on the day of the visit.This includes face to face time and non-face to face time preparing to see the patient (eg, review of tests), obtaining and/or reviewing separately obtained history, documenting clinical information in the electronic or other health record, independently interpreting results and communicating results to the patient/family/caregiver, or care coordinator.    Visit today included increased complexity associated with the care of the episodic problem ETD addressed and managing the longitudinal care of the patient due to the serious and/or complex managed problem(s) bmi 33.         This note was generated with the assistance of ambient listening technology. Verbal consent was obtained by the patient and accompanying visitor(s) for the recording of patient appointment to facilitate this note. I attest to having reviewed and edited the generated note for accuracy, though some syntax or spelling errors may persist. Please contact the author of this note for any clarification.           [1]   Social History  Socioeconomic History    Marital status:     Number of children: 1   Occupational History    Occupation: Unemployed   Tobacco Use    Smoking status: Never     Passive exposure: Never    Smokeless tobacco: Never   Substance and Sexual Activity    Alcohol use: Never    Drug use: No    Sexual activity: Yes     Partners: Male     Comment: postpartum

## 2025-08-08 ENCOUNTER — OFFICE VISIT (OUTPATIENT)
Dept: DERMATOLOGY | Facility: CLINIC | Age: 35
End: 2025-08-08
Payer: COMMERCIAL

## 2025-08-08 DIAGNOSIS — L70.0 ACNE VULGARIS: Primary | ICD-10-CM

## 2025-08-08 DIAGNOSIS — L73.2 HIDRADENITIS SUPPURATIVA: ICD-10-CM

## 2025-08-08 PROCEDURE — 99999 PR PBB SHADOW E&M-EST. PATIENT-LVL III: CPT | Mod: PBBFAC,,, | Performed by: STUDENT IN AN ORGANIZED HEALTH CARE EDUCATION/TRAINING PROGRAM

## 2025-08-08 RX ORDER — FERROUS GLUCONATE 324(38)MG
324 TABLET ORAL
COMMUNITY

## 2025-08-08 RX ORDER — CLINDAMYCIN PHOSPHATE 10 MG/ML
SOLUTION TOPICAL DAILY
Qty: 60 EACH | Refills: 2 | Status: SHIPPED | OUTPATIENT
Start: 2025-08-08 | End: 2026-08-08

## 2025-08-08 RX ORDER — MULTIVITAMIN WITH IRON
TABLET ORAL DAILY
COMMUNITY

## 2025-08-08 RX ORDER — TRETINOIN 0.25 MG/G
CREAM TOPICAL NIGHTLY
Qty: 45 G | Refills: 3 | Status: SHIPPED | OUTPATIENT
Start: 2025-08-08

## 2025-08-08 RX ORDER — PNV NO.95/FERROUS FUM/FOLIC AC 28MG-0.8MG
100 TABLET ORAL DAILY
COMMUNITY

## 2025-08-08 RX ORDER — DOXYCYCLINE HYCLATE 100 MG
100 TABLET ORAL DAILY
Qty: 90 TABLET | Refills: 0 | Status: SHIPPED | OUTPATIENT
Start: 2025-08-08

## 2025-08-08 NOTE — PROGRESS NOTES
Subjective:       Patient ID:  Karlee Guevara is a 35 y.o. female who presents for   Chief Complaint   Patient presents with    Acne     Reports getting acne on face, shoulders, under arms and occasionally on legs. Reports recently got on birth control things improved.  Currently just using a cleanser. Pt also notes concern for acne scarring      History of Present Illness: The patient presents with chief complaint of acne and breakouts.  Location: on the face, mostly on the lower face and cheeks. As well breakouts on the shoulders and under the arms  Duration: off and on for years  Signs/Symptoms: deep nodules, red bumps, and pus bumps, scarring nodules  Prior treatments: using OTC products with minimal improvement.       Acne        Review of Systems   Constitutional:  Negative for fever and chills.   Skin:  Negative for itching, rash and dry skin.        Objective:    Physical Exam   Constitutional: She appears well-developed and well-nourished. No distress.   Neurological: She is alert and oriented to person, place, and time. She is not disoriented.   Psychiatric: She has a normal mood and affect.   Skin:   Areas Examined (abnormalities noted in diagram):   Head / Face Inspection Performed  Neck Inspection Performed  Chest / Axilla Inspection Performed  RUE Inspected  LUE Inspection Performed                   Diagram Legend     Erythematous scaling macule/papule c/w actinic keratosis       Vascular papule c/w angioma      Pigmented verrucoid papule/plaque c/w seborrheic keratosis      Yellow umbilicated papule c/w sebaceous hyperplasia      Irregularly shaped tan macule c/w lentigo     1-2 mm smooth white papules consistent with Milia      Movable subcutaneous cyst with punctum c/w epidermal inclusion cyst      Subcutaneous movable cyst c/w pilar cyst      Firm pink to brown papule c/w dermatofibroma      Pedunculated fleshy papule(s) c/w skin tag(s)      Evenly pigmented macule c/w junctional  nevus     Mildly variegated pigmented, slightly irregular-bordered macule c/w mildly atypical nevus      Flesh colored to evenly pigmented papule c/w intradermal nevus       Pink pearly papule/plaque c/w basal cell carcinoma      Erythematous hyperkeratotic cursted plaque c/w SCC      Surgical scar with no sign of skin cancer recurrence      Open and closed comedones      Inflammatory papules and pustules      Verrucoid papule consistent consistent with wart     Erythematous eczematous patches and plaques     Dystrophic onycholytic nail with subungual debris c/w onychomycosis     Umbilicated papule    Erythematous-base heme-crusted tan verrucoid plaque consistent with inflamed seborrheic keratosis     Erythematous Silvery Scaling Plaque c/w Psoriasis     See annotation      Assessment / Plan:        Acne vulgaris  Hidradenitis suppurativa  -     doxycycline (VIBRA-TABS) 100 MG tablet; Take 1 tablet (100 mg total) by mouth once daily.  Dispense: 90 tablet; Refill: 0  -     clindamycin (CLEOCIN T) 1 % Swab; Apply topically once daily.  Dispense: 60 each; Refill: 2  -     tretinoin (RETIN-A) 0.025 % cream; Apply topically every evening.  Dispense: 45 g; Refill: 3         Follow up in about 1 year (around 8/8/2026).

## 2025-08-25 ENCOUNTER — LAB VISIT (OUTPATIENT)
Dept: LAB | Facility: HOSPITAL | Age: 35
End: 2025-08-25
Attending: INTERNAL MEDICINE
Payer: COMMERCIAL

## 2025-08-25 DIAGNOSIS — E55.9 VITAMIN D DEFICIENCY: ICD-10-CM

## 2025-08-25 DIAGNOSIS — Z00.00 ENCOUNTER FOR SCREENING AND PREVENTATIVE CARE: ICD-10-CM

## 2025-08-25 LAB
25(OH)D3+25(OH)D2 SERPL-MCNC: 32 NG/ML (ref 30–96)
CHOLEST SERPL-MCNC: 215 MG/DL (ref 120–199)
CHOLEST/HDLC SERPL: 6.1 {RATIO} (ref 2–5)
HDLC SERPL-MCNC: 35 MG/DL (ref 40–75)
HDLC SERPL: 16.3 % (ref 20–50)
LDLC SERPL CALC-MCNC: 143.8 MG/DL (ref 63–159)
NONHDLC SERPL-MCNC: 180 MG/DL
TRIGL SERPL-MCNC: 181 MG/DL (ref 30–150)

## 2025-08-25 PROCEDURE — 80061 LIPID PANEL: CPT

## 2025-08-25 PROCEDURE — 82306 VITAMIN D 25 HYDROXY: CPT

## 2025-08-25 PROCEDURE — 36415 COLL VENOUS BLD VENIPUNCTURE: CPT
